# Patient Record
Sex: MALE | Race: WHITE | Employment: OTHER | URBAN - METROPOLITAN AREA
[De-identification: names, ages, dates, MRNs, and addresses within clinical notes are randomized per-mention and may not be internally consistent; named-entity substitution may affect disease eponyms.]

---

## 2018-07-30 ENCOUNTER — OFFICE VISIT (OUTPATIENT)
Dept: CARDIOLOGY CLINIC | Age: 63
End: 2018-07-30
Payer: COMMERCIAL

## 2018-07-30 VITALS
WEIGHT: 274 LBS | HEIGHT: 69 IN | BODY MASS INDEX: 40.58 KG/M2 | RESPIRATION RATE: 16 BRPM | HEART RATE: 74 BPM | DIASTOLIC BLOOD PRESSURE: 82 MMHG | SYSTOLIC BLOOD PRESSURE: 134 MMHG

## 2018-07-30 DIAGNOSIS — Z95.5 STENTED CORONARY ARTERY: Chronic | ICD-10-CM

## 2018-07-30 DIAGNOSIS — I25.10 CORONARY ARTERY DISEASE INVOLVING NATIVE CORONARY ARTERY OF NATIVE HEART WITHOUT ANGINA PECTORIS: Primary | Chronic | ICD-10-CM

## 2018-07-30 DIAGNOSIS — E78.00 PURE HYPERCHOLESTEROLEMIA: Chronic | ICD-10-CM

## 2018-07-30 DIAGNOSIS — I25.2 HX OF MYOCARDIAL INFARCTION: Chronic | ICD-10-CM

## 2018-07-30 PROCEDURE — 99214 OFFICE O/P EST MOD 30 MIN: CPT | Performed by: INTERNAL MEDICINE

## 2018-07-30 PROCEDURE — 93000 ELECTROCARDIOGRAM COMPLETE: CPT | Performed by: INTERNAL MEDICINE

## 2018-07-30 RX ORDER — FERROUS SULFATE 325(65) MG
TABLET ORAL
Refills: 1 | COMMUNITY
Start: 2018-07-06 | End: 2020-10-19 | Stop reason: CLARIF

## 2018-07-30 RX ORDER — GABAPENTIN 600 MG/1
600 TABLET ORAL 2 TIMES DAILY
COMMUNITY

## 2020-10-19 ENCOUNTER — OFFICE VISIT (OUTPATIENT)
Dept: CARDIOLOGY CLINIC | Age: 65
End: 2020-10-19
Payer: COMMERCIAL

## 2020-10-19 VITALS
BODY MASS INDEX: 39.55 KG/M2 | RESPIRATION RATE: 16 BRPM | SYSTOLIC BLOOD PRESSURE: 132 MMHG | HEIGHT: 69 IN | DIASTOLIC BLOOD PRESSURE: 78 MMHG | WEIGHT: 267 LBS | HEART RATE: 63 BPM

## 2020-10-19 PROCEDURE — 93000 ELECTROCARDIOGRAM COMPLETE: CPT | Performed by: INTERNAL MEDICINE

## 2020-10-19 PROCEDURE — 99214 OFFICE O/P EST MOD 30 MIN: CPT | Performed by: INTERNAL MEDICINE

## 2020-10-19 NOTE — PROGRESS NOTES
Patient Active Problem List   Diagnosis    CAD (coronary artery disease)    acute inferior wall MI 10/08/10    S/P PCI of RCA     Hypertension    Hyperlipidemia    Chest pain    Pseudoaneurysm ([address]    Stented coronary artery    Old MI (myocardial infarction)    Hx of myocardial infarction       Current Outpatient Medications   Medication Sig Dispense Refill    gabapentin (NEURONTIN) 600 MG tablet Take 600 mg by mouth 2 times daily.  nitroGLYCERIN (NITROSTAT) 0.4 MG SL tablet   1    citalopram (CELEXA) 20 MG tablet Take 20 mg by mouth daily.  Testosterone (ANDROGEL PUMP) 20.25 MG/ACT (1.62%) GEL gel Place 1 Squirt onto the skin.  aspirin 81 MG tablet Take 81 mg by mouth daily.  Cholecalciferol (VITAMIN D-3) 5000 UNITS TABS Take 5,000 Units by mouth daily.  atorvastatin (LIPITOR) 40 MG tablet Take 40 mg by mouth daily.  enalapril (VASOTEC) 10 MG tablet Take 10 mg by mouth daily.  metoprolol (LOPRESSOR) 25 MG tablet Take 25 mg by mouth 2 times daily. No current facility-administered medications for this visit. CC:    Patient is seen for new problem of chest pain and in follow up for:  1. Old MI (myocardial infarction)    2. Essential hypertension    3. Pure hypercholesterolemia    4. Stented coronary artery    5. New-onset angina (HCC)        HPI:  Notes onset about 1 month ago of chest pain. Notes when he mows the lawn he has to stop secondary to chest pain. Has required nitroglycerin sublingual for relief. Also, notes he has been waking up at night with some chest discomfort and has relief with nitroglycerin as well. Notes pain seems to be becoming more significant.     ROS:   General: No unusual weight gain,  change in exercise tolerance  Skin: No rash or itching  EENT: No vision changes or nosebleeds  Cardiovascular: No orthopnea or paroxysmal nocturnal dyspnea  Respiratory: No cough or hemoptysis  Gastrointestinal: No hematemesis or recent changes in bowel habits  Genitourinary: No hematuria, urgency or frequency  Musculoskeletal: No muscular weakness or joint swelling   Neurologic / Psychiatric: No incoordination or convulsions  Allergic / Immunologic/ Lymphatic / Endocrine: No anemia or bleeding tendency    Social History     Socioeconomic History    Marital status:      Spouse name: Not on file    Number of children: Not on file    Years of education: Not on file    Highest education level: Not on file   Occupational History    Not on file   Social Needs    Financial resource strain: Not on file    Food insecurity     Worry: Not on file     Inability: Not on file    Transportation needs     Medical: Not on file     Non-medical: Not on file   Tobacco Use    Smoking status: Never Smoker    Smokeless tobacco: Former User     Types: Snuff   Substance and Sexual Activity    Alcohol use: Yes     Comment: occassionally    Drug use: No    Sexual activity: Not on file   Lifestyle    Physical activity     Days per week: Not on file     Minutes per session: Not on file    Stress: Not on file   Relationships    Social connections     Talks on phone: Not on file     Gets together: Not on file     Attends [employer] service: Not on file     Active member of club or organization: Not on file     Attends meetings of clubs or organizations: Not on file     Relationship status: Not on file    Intimate partner violence     Fear of current or ex partner: Not on file     Emotionally abused: Not on file     Physically abused: Not on file     Forced sexual activity: Not on file   Other Topics Concern    Not on file   Social History Narrative    Not on file       Family History   Problem Relation Age of Onset    Heart Disease Mother     Heart Disease Father     Heart Disease Brother     Heart Disease Maternal Aunt     Heart Disease Maternal Uncle     Heart Disease Maternal Grandfather        Past Medical History:   Diagnosis Date    CAD (coronary artery disease)     Hyperlipidemia     Hypertension     Myocardial infarct (HCC)        PHYSICAL EXAM:  CONSTITUTIONAL:  Well developed, well nourished    Vitals:    10/19/20 0910   BP: 132/78   Pulse: 63   Resp: 16   Weight: 267 lb (121.1 kg)   Height: 5' 9\" (1.753 m)     HEAD & FACE: Normocephalic. Symmetric. EYES: No xanthelasma. Conjunctivae not injected. EARS, NOSE, MOUTH & THROAT: Good dentition. No oral pallor or cyanosis. NECK: No JVD at 30 degrees. No thyromegaly. RESPIRATORY: Clear to auscultation and percussion in all fields. No use of accessory muscle or intercostal retractions. CARDIOVASCULAR: Regular rate and rhythm. No lifts or thrills on palpitation. Auscultation with normal S1-S2 in intensity and splitting. No carotid bruits. Abdominal aorta not enlarged. Femoral arteries without bruits. Pedal pulses 2+. No edema. ABDOMEN: Soft without hepatic or splenic enlargement. No tenderness. MUSCULOSKELETAL: No kyphosis or scoliosis of the back. Good muscle strength and tone. No muscle atrophy. Normal gait and ability to undergo exercise stress testing. EXTREMITIES: No clubbing or cyanosis. SKIN: No Xanthomas or ulcerations. NEUROLOGIC: Oriented to time, place and person. Normal mood and affect. LYMPHATIC:  No palpable neck or supraclavicular nodes. No splenomegaly. EKG: the EKG tracing was reviewed and found to reveal: Normal sinus rhythm. No change compared to prior tracing. ASSESSMENT:                                                     ORDERS:       Diagnosis Orders   1. New-onset angina ([address]     2. Old MI (myocardial infarction)     3. Essential hypertension     4. Pure hypercholesterolemia     5. Stented coronary artery  Left heart cath   New onset angina      PLAN:   See above orders. Medication reconciliation completed.   Old records were reviewed and found to reveal: No change in EKG  Discussed issues that would prompt earlier evaluation/ER   Same cardiac medications. See no reason to pursue other options of evaluation. Discussed status with patient and will pursue heart catheterization due to his classic symptoms and history. Follow-up office visit: Pending above evaluation.

## 2020-10-20 ENCOUNTER — HOSPITAL ENCOUNTER (OUTPATIENT)
Age: 65
Discharge: HOME | End: 2020-10-20
Payer: COMMERCIAL

## 2020-10-20 LAB
ALBUMIN SERPL-MCNC: 3.9 G/DL (ref 3.5–5.2)
ALP BLD-CCNC: 87 U/L (ref 40–129)
ALT SERPL-CCNC: 24 U/L (ref 0–40)
ANION GAP SERPL CALCULATED.3IONS-SCNC: 6 MMOL/L (ref 7–16)
AST SERPL-CCNC: 20 U/L (ref 0–39)
BILIRUB SERPL-MCNC: 0.4 MG/DL (ref 0–1.2)
BUN BLDV-MCNC: 13 MG/DL (ref 8–23)
CALCIUM SERPL-MCNC: 9.2 MG/DL (ref 8.6–10.2)
CHLORIDE BLD-SCNC: 107 MMOL/L (ref 98–107)
CO2: 27 MMOL/L (ref 22–29)
CREAT SERPL-MCNC: 0.9 MG/DL (ref 0.7–1.2)
GFR AFRICAN AMERICAN: >60
GFR NON-AFRICAN AMERICAN: >60 ML/MIN/1.73
GLUCOSE BLD-MCNC: 110 MG/DL (ref 74–99)
HCT VFR BLD CALC: 44.7 % (ref 37–54)
HEMOGLOBIN: 14.2 G/DL (ref 12.5–16.5)
INR BLD: 0.9
MCH RBC QN AUTO: 28.5 PG (ref 26–35)
MCHC RBC AUTO-ENTMCNC: 31.8 % (ref 32–34.5)
MCV RBC AUTO: 89.6 FL (ref 80–99.9)
PDW BLD-RTO: 13.3 FL (ref 11.5–15)
PLATELET # BLD: 202 E9/L (ref 130–450)
PMV BLD AUTO: 10.4 FL (ref 7–12)
POTASSIUM SERPL-SCNC: 5.1 MMOL/L (ref 3.5–5)
PROTHROMBIN TIME: 10.8 SEC (ref 9.3–12.4)
RBC # BLD: 4.99 E12/L (ref 3.8–5.8)
SODIUM BLD-SCNC: 140 MMOL/L (ref 132–146)
TOTAL PROTEIN: 6.8 G/DL (ref 6.4–8.3)
WBC # BLD: 5.1 E9/L (ref 4.5–11.5)

## 2020-10-20 PROCEDURE — 36415 COLL VENOUS BLD VENIPUNCTURE: CPT

## 2020-10-20 PROCEDURE — 80053 COMPREHEN METABOLIC PANEL: CPT

## 2020-10-20 PROCEDURE — 85610 PROTHROMBIN TIME: CPT

## 2020-10-20 PROCEDURE — 85027 COMPLETE CBC AUTOMATED: CPT

## 2020-10-22 ENCOUNTER — HOSPITAL ENCOUNTER (INPATIENT)
Dept: CARDIAC CATH/INVASIVE PROCEDURES | Age: 65
LOS: 2 days | Discharge: HOME | DRG: 251 | End: 2020-10-24
Attending: FAMILY MEDICINE | Admitting: INTERNAL MEDICINE
Payer: COMMERCIAL

## 2020-10-22 PROBLEM — I24.9 CORONARY SYNDROME, ACUTE (HCC): Status: ACTIVE | Noted: 2020-10-22

## 2020-10-22 PROBLEM — I25.10 CAD IN NATIVE ARTERY: Status: ACTIVE | Noted: 2020-10-22

## 2020-10-22 LAB
ABO/RH: NORMAL
ANTIBODY SCREEN: NORMAL
EKG ATRIAL RATE: 44 BPM
EKG P AXIS: -23 DEGREES
EKG P-R INTERVAL: 178 MS
EKG Q-T INTERVAL: 432 MS
EKG QRS DURATION: 88 MS
EKG QTC CALCULATION (BAZETT): 427 MS
EKG R AXIS: 27 DEGREES
EKG T AXIS: 17 DEGREES
EKG VENTRICULAR RATE: 59 BPM
POC ACT LR: 222 SECONDS
POC ACT LR: 262 SECONDS

## 2020-10-22 PROCEDURE — 86900 BLOOD TYPING SEROLOGIC ABO: CPT

## 2020-10-22 PROCEDURE — 6370000000 HC RX 637 (ALT 250 FOR IP): Performed by: INTERNAL MEDICINE

## 2020-10-22 PROCEDURE — 92978 ENDOLUMINL IVUS OCT C 1ST: CPT | Performed by: INTERNAL MEDICINE

## 2020-10-22 PROCEDURE — 86850 RBC ANTIBODY SCREEN: CPT

## 2020-10-22 PROCEDURE — 93458 L HRT ARTERY/VENTRICLE ANGIO: CPT

## 2020-10-22 PROCEDURE — 2709999900 HC NON-CHARGEABLE SUPPLY

## 2020-10-22 PROCEDURE — 93571 IV DOP VEL&/PRESS C FLO 1ST: CPT

## 2020-10-22 PROCEDURE — C1769 GUIDE WIRE: HCPCS

## 2020-10-22 PROCEDURE — 2140000000 HC CCU INTERMEDIATE R&B

## 2020-10-22 PROCEDURE — 92928 PRQ TCAT PLMT NTRAC ST 1 LES: CPT | Performed by: INTERNAL MEDICINE

## 2020-10-22 PROCEDURE — G0378 HOSPITAL OBSERVATION PER HR: HCPCS

## 2020-10-22 PROCEDURE — 2580000003 HC RX 258: Performed by: INTERNAL MEDICINE

## 2020-10-22 PROCEDURE — 92920 PRQ TRLUML C ANGIOP 1ART&/BR: CPT

## 2020-10-22 PROCEDURE — 93458 L HRT ARTERY/VENTRICLE ANGIO: CPT | Performed by: INTERNAL MEDICINE

## 2020-10-22 PROCEDURE — 93571 IV DOP VEL&/PRESS C FLO 1ST: CPT | Performed by: INTERNAL MEDICINE

## 2020-10-22 PROCEDURE — C1725 CATH, TRANSLUMIN NON-LASER: HCPCS

## 2020-10-22 PROCEDURE — 4A023N7 MEASUREMENT OF CARDIAC SAMPLING AND PRESSURE, LEFT HEART, PERCUTANEOUS APPROACH: ICD-10-PCS | Performed by: INTERNAL MEDICINE

## 2020-10-22 PROCEDURE — B2111ZZ FLUOROSCOPY OF MULTIPLE CORONARY ARTERIES USING LOW OSMOLAR CONTRAST: ICD-10-PCS | Performed by: INTERNAL MEDICINE

## 2020-10-22 PROCEDURE — 93010 ELECTROCARDIOGRAM REPORT: CPT | Performed by: INTERNAL MEDICINE

## 2020-10-22 PROCEDURE — C1753 CATH, INTRAVAS ULTRASOUND: HCPCS

## 2020-10-22 PROCEDURE — 2500000003 HC RX 250 WO HCPCS

## 2020-10-22 PROCEDURE — B241ZZ3 ULTRASONOGRAPHY OF MULTIPLE CORONARY ARTERIES, INTRAVASCULAR: ICD-10-PCS | Performed by: INTERNAL MEDICINE

## 2020-10-22 PROCEDURE — 93005 ELECTROCARDIOGRAM TRACING: CPT | Performed by: INTERNAL MEDICINE

## 2020-10-22 PROCEDURE — 92978 ENDOLUMINL IVUS OCT C 1ST: CPT

## 2020-10-22 PROCEDURE — C1894 INTRO/SHEATH, NON-LASER: HCPCS

## 2020-10-22 PROCEDURE — 36415 COLL VENOUS BLD VENIPUNCTURE: CPT

## 2020-10-22 PROCEDURE — 86901 BLOOD TYPING SEROLOGIC RH(D): CPT

## 2020-10-22 PROCEDURE — 02703ZZ DILATION OF CORONARY ARTERY, ONE ARTERY, PERCUTANEOUS APPROACH: ICD-10-PCS | Performed by: INTERNAL MEDICINE

## 2020-10-22 PROCEDURE — G0379 DIRECT REFER HOSPITAL OBSERV: HCPCS

## 2020-10-22 PROCEDURE — 85347 COAGULATION TIME ACTIVATED: CPT

## 2020-10-22 PROCEDURE — B2151ZZ FLUOROSCOPY OF LEFT HEART USING LOW OSMOLAR CONTRAST: ICD-10-PCS | Performed by: INTERNAL MEDICINE

## 2020-10-22 PROCEDURE — 6370000000 HC RX 637 (ALT 250 FOR IP): Performed by: FAMILY MEDICINE

## 2020-10-22 PROCEDURE — C1887 CATHETER, GUIDING: HCPCS

## 2020-10-22 PROCEDURE — 6360000002 HC RX W HCPCS

## 2020-10-22 PROCEDURE — 6370000000 HC RX 637 (ALT 250 FOR IP)

## 2020-10-22 RX ORDER — NITROGLYCERIN 0.4 MG/1
0.4 TABLET SUBLINGUAL EVERY 5 MIN PRN
Status: DISCONTINUED | OUTPATIENT
Start: 2020-10-22 | End: 2020-10-24 | Stop reason: HOSPADM

## 2020-10-22 RX ORDER — ATORVASTATIN CALCIUM 40 MG/1
40 TABLET, FILM COATED ORAL DAILY
Status: DISCONTINUED | OUTPATIENT
Start: 2020-10-22 | End: 2020-10-24 | Stop reason: HOSPADM

## 2020-10-22 RX ORDER — ACETAMINOPHEN 325 MG/1
650 TABLET ORAL EVERY 6 HOURS PRN
Status: DISCONTINUED | OUTPATIENT
Start: 2020-10-22 | End: 2020-10-24 | Stop reason: HOSPADM

## 2020-10-22 RX ORDER — POLYETHYLENE GLYCOL 3350 17 G/17G
17 POWDER, FOR SOLUTION ORAL DAILY PRN
Status: DISCONTINUED | OUTPATIENT
Start: 2020-10-22 | End: 2020-10-24 | Stop reason: HOSPADM

## 2020-10-22 RX ORDER — GABAPENTIN 600 MG/1
600 TABLET ORAL 2 TIMES DAILY
Status: DISCONTINUED | OUTPATIENT
Start: 2020-10-22 | End: 2020-10-24 | Stop reason: HOSPADM

## 2020-10-22 RX ORDER — PROMETHAZINE HYDROCHLORIDE 25 MG/1
12.5 TABLET ORAL EVERY 6 HOURS PRN
Status: DISCONTINUED | OUTPATIENT
Start: 2020-10-22 | End: 2020-10-24 | Stop reason: HOSPADM

## 2020-10-22 RX ORDER — SODIUM CHLORIDE 9 MG/ML
INJECTION, SOLUTION INTRAVENOUS CONTINUOUS
Status: DISCONTINUED | OUTPATIENT
Start: 2020-10-22 | End: 2020-10-23

## 2020-10-22 RX ORDER — SODIUM CHLORIDE 9 MG/ML
INJECTION, SOLUTION INTRAVENOUS CONTINUOUS
Status: ACTIVE | OUTPATIENT
Start: 2020-10-22 | End: 2020-10-22

## 2020-10-22 RX ORDER — SODIUM CHLORIDE 0.9 % (FLUSH) 0.9 %
10 SYRINGE (ML) INJECTION EVERY 12 HOURS SCHEDULED
Status: DISCONTINUED | OUTPATIENT
Start: 2020-10-22 | End: 2020-10-24 | Stop reason: HOSPADM

## 2020-10-22 RX ORDER — ACETAMINOPHEN 325 MG/1
650 TABLET ORAL EVERY 4 HOURS PRN
Status: DISCONTINUED | OUTPATIENT
Start: 2020-10-22 | End: 2020-10-22 | Stop reason: SDUPTHER

## 2020-10-22 RX ORDER — ISOSORBIDE MONONITRATE 60 MG/1
60 TABLET, EXTENDED RELEASE ORAL DAILY
Status: DISCONTINUED | OUTPATIENT
Start: 2020-10-22 | End: 2020-10-24 | Stop reason: HOSPADM

## 2020-10-22 RX ORDER — ASPIRIN 81 MG/1
81 TABLET ORAL DAILY
Status: DISCONTINUED | OUTPATIENT
Start: 2020-10-23 | End: 2020-10-22 | Stop reason: SDUPTHER

## 2020-10-22 RX ORDER — CHOLECALCIFEROL (VITAMIN D3) 50 MCG
2000 TABLET ORAL DAILY
Status: DISCONTINUED | OUTPATIENT
Start: 2020-10-22 | End: 2020-10-24 | Stop reason: HOSPADM

## 2020-10-22 RX ORDER — CITALOPRAM 20 MG/1
20 TABLET ORAL DAILY
Status: DISCONTINUED | OUTPATIENT
Start: 2020-10-22 | End: 2020-10-24 | Stop reason: HOSPADM

## 2020-10-22 RX ORDER — ENALAPRIL MALEATE 10 MG/1
10 TABLET ORAL DAILY
Status: DISCONTINUED | OUTPATIENT
Start: 2020-10-22 | End: 2020-10-24 | Stop reason: HOSPADM

## 2020-10-22 RX ORDER — SODIUM CHLORIDE 0.9 % (FLUSH) 0.9 %
10 SYRINGE (ML) INJECTION PRN
Status: DISCONTINUED | OUTPATIENT
Start: 2020-10-22 | End: 2020-10-22 | Stop reason: SDUPTHER

## 2020-10-22 RX ORDER — SODIUM CHLORIDE 0.9 % (FLUSH) 0.9 %
10 SYRINGE (ML) INJECTION PRN
Status: DISCONTINUED | OUTPATIENT
Start: 2020-10-22 | End: 2020-10-24 | Stop reason: HOSPADM

## 2020-10-22 RX ORDER — SODIUM CHLORIDE 0.9 % (FLUSH) 0.9 %
10 SYRINGE (ML) INJECTION EVERY 12 HOURS SCHEDULED
Status: DISCONTINUED | OUTPATIENT
Start: 2020-10-22 | End: 2020-10-22 | Stop reason: SDUPTHER

## 2020-10-22 RX ORDER — ONDANSETRON 2 MG/ML
4 INJECTION INTRAMUSCULAR; INTRAVENOUS EVERY 6 HOURS PRN
Status: DISCONTINUED | OUTPATIENT
Start: 2020-10-22 | End: 2020-10-24 | Stop reason: HOSPADM

## 2020-10-22 RX ORDER — ASPIRIN 81 MG/1
81 TABLET ORAL DAILY
Status: DISCONTINUED | OUTPATIENT
Start: 2020-10-23 | End: 2020-10-24 | Stop reason: HOSPADM

## 2020-10-22 RX ORDER — ACETAMINOPHEN 650 MG/1
650 SUPPOSITORY RECTAL EVERY 6 HOURS PRN
Status: DISCONTINUED | OUTPATIENT
Start: 2020-10-22 | End: 2020-10-24 | Stop reason: HOSPADM

## 2020-10-22 RX ADMIN — SODIUM CHLORIDE: 9 INJECTION, SOLUTION INTRAVENOUS at 14:00

## 2020-10-22 RX ADMIN — ISOSORBIDE MONONITRATE 60 MG: 60 TABLET ORAL at 17:43

## 2020-10-22 RX ADMIN — ATORVASTATIN CALCIUM 40 MG: 40 TABLET, FILM COATED ORAL at 20:40

## 2020-10-22 RX ADMIN — ENALAPRIL MALEATE 10 MG: 10 TABLET ORAL at 17:43

## 2020-10-22 RX ADMIN — SODIUM CHLORIDE, PRESERVATIVE FREE 10 ML: 5 INJECTION INTRAVENOUS at 20:40

## 2020-10-22 RX ADMIN — Medication 2000 UNITS: at 17:42

## 2020-10-22 RX ADMIN — TICAGRELOR 90 MG: 90 TABLET ORAL at 20:40

## 2020-10-22 RX ADMIN — CITALOPRAM 20 MG: 20 TABLET, FILM COATED ORAL at 17:43

## 2020-10-22 RX ADMIN — SODIUM CHLORIDE: 9 INJECTION, SOLUTION INTRAVENOUS at 11:00

## 2020-10-22 NOTE — PROGRESS NOTES
After arrival to floor heart rate in mid 40's to 50's. Dr Rebeca Angel notified. Order received to hold if HR < 65. No c/o or distress.

## 2020-10-22 NOTE — PROGRESS NOTES
Message left on answering machine for Dr. Ortiz Reading regarding patient admission post cath. Also attempted to call their answering service and was told Dr. Kirti Veliz was out to Dr. William Queen to call office. Admitting order placed after message left.

## 2020-10-22 NOTE — PROCEDURES
[address]                  [address] [facility],  [facility]                            CARDIAC CATHETERIZATION    PATIENT NAME: Allen Baltazar                  :        1955  MED REC NO:   [id number]                            ROOM:       6318  ACCOUNT NO:   [de-identified]                           ADMIT DATE: 10/22/2020  PROVIDER:     Geraldo Padron MD    DATE OF PROCEDURE:  10/22/2020    LEFT HEART CATHETERIZATION, IFR OF LAD, PTCA AND IVUS OF THE RCA    INDICATION:  New-onset angina. REFERRING PHYSICIAN:  Arina Ferguson MD    PROCEDURE NOTE:  The patient was brought to the cardiac cath lab in his  usual fasting state. Under sterile condition and under local anesthetic  and using conscious sedation, a 6-Micronesian Terumo slender sheath was  inserted into the right radial artery. The patient received 5000 units  of intravenous heparin. He received also 300 mcg of intra-arterial  nitroglycerin via the right radial sheath. Then a 5-Micronesian TIG  diagnostic catheter was advanced to the ascending aorta without  difficulty. The left main coronary artery was engaged, and a coronary  angiogram was done. This catheter failed to engage the right coronary  artery. It was exchanged over a guidewire to a 5-Micronesian AR-2 diagnostic  catheter which was able to engage the right coronary artery, and a  coronary angiogram was done. A 5-Micronesian pigtail was advanced into the  left ventricle without difficulty. The left ventricular end-diastolic  pressure was measured. Left ventriculogram was done. There was no  significant gradient across the aortic valve. FINDINGS:  HEMODYNAMIC:  Aortic pressure 110/84 mmHg. LVEDP 28 mmHg. CORONARY ANATOMY:  LEFT MAIN:  It is a long and large artery with no angiographic stenosis  noted. LAD:  It is a large artery which is calcified in its proximal to mid  segment.   It gives rise to three small diagonal branches and large  septal . There was a long 60 to 70% mid stenosis. There was  40 to 50% discrete stenosis in proximal second diagonal and 70% discrete  stenosis in the third diagonal branch which is approximately 1.5-1.6 mm  in diameter. LEFT CIRCUMFLEX:  It is a large artery giving rise to a large  bifurcating first obtuse marginal branch and a second smaller obtuse  marginal branch. There was 20-30% proximal circumflex stenosis. RCA:  It is medium in size and dominant giving rise to a conus branch, a  fair size RV marginal branch, a PDA and a PLV branch. The RCA has an  inferior takeoff. It is calcified in its proximal to mid segment. There was a long 50 to 60% mid stenosis. There was a stent inside the  stent in the distal RCA prior to the bifurcation with 80 to 90% focal  in-stent restenosis involving the proximal segment of the stents. LEFT VENTRICULOGRAM:  Revealed basal inferior akinesis with an ejection  fraction of 55%. There was trivial mitral regurgitation. IFR OF LAD AND PCI AND IVUS OF THE RCA:  The patient received 4000 units  of intravenous heparin. Then a 6-[place] Islander EBU 3.5 guiding catheter was  used to engage the left main coronary artery. Then, IFR Volcano  catheter was zeroed and normalized and was advanced to the distal LAD  without difficulty. IFR obtained was 0.90, then 0.90, then 0.89 x3. the patient was asked about his preference of CABG versus PCI of RCA  with staged PCI of the LAD. The patient was relucting to undergo bypass  at this time. So decision was made to proceed with PCI of the RCA and  staged PCI of the LAD in the next few weeks. Then a 6-[place] Islander ART 3.5  guiding catheter failed to engage the right coronary artery. It was  exchanged over a guidewire to a 6-[place] Islander JR-4 guiding catheter with side  holes. Then a BMW wire was advanced to the PDA without difficulty.    Then a 2.5 x 15 [employer] Monorail balloon was inflated at 12 atmospheres at  the site of the intravenous Versed and 150 mcg of  intravenous fentanyl.         Marty Escobedo MD    D: 10/22/2020 10:28:45       T: 10/22/2020 10:36:40     [email or web address]  Job#: [id number]     Doc#: [id number]    CC:

## 2020-10-23 LAB
ANION GAP SERPL CALCULATED.3IONS-SCNC: 9 MMOL/L (ref 7–16)
BUN BLDV-MCNC: 15 MG/DL (ref 8–23)
CALCIUM SERPL-MCNC: 8.6 MG/DL (ref 8.6–10.2)
CHLORIDE BLD-SCNC: 103 MMOL/L (ref 98–107)
CO2: 22 MMOL/L (ref 22–29)
CREAT SERPL-MCNC: 0.9 MG/DL (ref 0.7–1.2)
GFR AFRICAN AMERICAN: >60
GFR NON-AFRICAN AMERICAN: >60 ML/MIN/1.73
GLUCOSE BLD-MCNC: 112 MG/DL (ref 74–99)
HCT VFR BLD CALC: 40.2 % (ref 37–54)
HEMOGLOBIN: 13.2 G/DL (ref 12.5–16.5)
MAGNESIUM: 1.9 MG/DL (ref 1.6–2.6)
MCH RBC QN AUTO: 28.4 PG (ref 26–35)
MCHC RBC AUTO-ENTMCNC: 32.8 % (ref 32–34.5)
MCV RBC AUTO: 86.6 FL (ref 80–99.9)
PDW BLD-RTO: 13.3 FL (ref 11.5–15)
PLATELET # BLD: 171 E9/L (ref 130–450)
PMV BLD AUTO: 10.2 FL (ref 7–12)
POTASSIUM SERPL-SCNC: 3.8 MMOL/L (ref 3.5–5)
RBC # BLD: 4.64 E12/L (ref 3.8–5.8)
SODIUM BLD-SCNC: 134 MMOL/L (ref 132–146)
WBC # BLD: 5.9 E9/L (ref 4.5–11.5)

## 2020-10-23 PROCEDURE — 99232 SBSQ HOSP IP/OBS MODERATE 35: CPT | Performed by: INTERNAL MEDICINE

## 2020-10-23 PROCEDURE — 80048 BASIC METABOLIC PNL TOTAL CA: CPT

## 2020-10-23 PROCEDURE — 6370000000 HC RX 637 (ALT 250 FOR IP): Performed by: INTERNAL MEDICINE

## 2020-10-23 PROCEDURE — 6370000000 HC RX 637 (ALT 250 FOR IP): Performed by: FAMILY MEDICINE

## 2020-10-23 PROCEDURE — 2140000000 HC CCU INTERMEDIATE R&B

## 2020-10-23 PROCEDURE — G0378 HOSPITAL OBSERVATION PER HR: HCPCS

## 2020-10-23 PROCEDURE — 83735 ASSAY OF MAGNESIUM: CPT

## 2020-10-23 PROCEDURE — 85027 COMPLETE CBC AUTOMATED: CPT

## 2020-10-23 PROCEDURE — 2580000003 HC RX 258: Performed by: INTERNAL MEDICINE

## 2020-10-23 PROCEDURE — 36415 COLL VENOUS BLD VENIPUNCTURE: CPT

## 2020-10-23 RX ORDER — EZETIMIBE 10 MG/1
10 TABLET ORAL NIGHTLY
Status: DISCONTINUED | OUTPATIENT
Start: 2020-10-23 | End: 2020-10-24 | Stop reason: HOSPADM

## 2020-10-23 RX ADMIN — Medication 2000 UNITS: at 10:23

## 2020-10-23 RX ADMIN — GABAPENTIN 600 MG: 600 TABLET, FILM COATED ORAL at 10:22

## 2020-10-23 RX ADMIN — TICAGRELOR 90 MG: 90 TABLET ORAL at 21:54

## 2020-10-23 RX ADMIN — ATORVASTATIN CALCIUM 40 MG: 40 TABLET, FILM COATED ORAL at 21:54

## 2020-10-23 RX ADMIN — METOPROLOL TARTRATE 25 MG: 25 TABLET, FILM COATED ORAL at 10:22

## 2020-10-23 RX ADMIN — SODIUM CHLORIDE, PRESERVATIVE FREE 10 ML: 5 INJECTION INTRAVENOUS at 21:54

## 2020-10-23 RX ADMIN — ENALAPRIL MALEATE 10 MG: 10 TABLET ORAL at 10:23

## 2020-10-23 RX ADMIN — ISOSORBIDE MONONITRATE 60 MG: 60 TABLET ORAL at 10:23

## 2020-10-23 RX ADMIN — GABAPENTIN 600 MG: 600 TABLET, FILM COATED ORAL at 21:54

## 2020-10-23 RX ADMIN — CITALOPRAM 20 MG: 20 TABLET, FILM COATED ORAL at 10:23

## 2020-10-23 RX ADMIN — ASPIRIN 81 MG: 81 TABLET, FILM COATED ORAL at 10:23

## 2020-10-23 RX ADMIN — EZETIMIBE 10 MG: 10 TABLET ORAL at 21:54

## 2020-10-23 RX ADMIN — TICAGRELOR 90 MG: 90 TABLET ORAL at 10:22

## 2020-10-23 NOTE — H&P
[address]                  [address] [facility],  [facility]                              HISTORY AND PHYSICAL    PATIENT NAME: Minh Bellamy                  :        1955  MED REC NO:   [id number]                            ROOM:       6318  ACCOUNT NO:   [de-identified]                           ADMIT DATE: 10/22/2020  PROVIDER:     Aditya Sandoval MD    CHIEF COMPLAINT:  Abnormal cardiac catheterization. HISTORY OF PRESENTING ILLNESSES:  This 53-year-old who has really over  the last month developed unstable angina. He has presented to the  hospital with a non-STEMI and went home. Recently saw his cardiologist  who referred him for cardiac catheterization. Yesterday, he had  catheterization and he had a left anterior descending artery that did  have 60% to 70% mid stenosis and had other stenoses in diagonal  branches. Circumflex also had lesions. Right coronary had a 60% mid  stenosis. There is stent inside the stent in the distal right coronary  with 80% to 90% focal in-stent restenosis developing there, so the  impression was that he had a physiologically significant long mid left  anterior descending artery stenosis. He had a 70% proximal stenosis in  a small third diagonal branch, severe mid right coronary artery stenosis  with in-stent restenosis and he was given an angioplasty for that. Because of this, it was elected to keep him in the hospital, put him on  dual antiplatelet therapy _____ Nilson Galas to the regimen. He had been on  dual cholesterol therapy for quite some time and he also did receive a  cardiac surgeon opinion. PAST MEDICAL HISTORY:  Significant for at least 10 years worth of  coronary artery disease. He has had coronary angioplasty with stent  placement in , again had it repeated in . He has had myocardial  infarction. He is known to have hyperlipidemia and hypertension.     SOCIAL HISTORY:  He has never

## 2020-10-23 NOTE — PROGRESS NOTES
CLINICAL PHARMACY NOTE: MEDS TO [address] Select Patient?: No  Total # of Prescriptions Filled: 1   The following medications were delivered to the patient:  · brilinta 90mg  Total # of Interventions Completed: 3  Time Spent (min): 45    Additional Documentation:  Delivered to patient

## 2020-10-23 NOTE — PROGRESS NOTES
Inpatient Cardiology Progress note     PATIENT IS BEING FOLLOWED FOR: CAD    Ayan Greene is a 63 y.o. male who follows with Dr. Jose Bourgeois (past 24 hours):  - no acute events overnight  - denies chest pain, palpitations or shortness of breath   - right wrist C/D/I, hand warm and well perfused      ROS:  Consist: Denies fevers, chills or night sweats  Heart: Denies chest pain, palpitations, lightheadedness, dizziness or syncope  Lungs: Denies SOB, cough, wheezing, orthopnea or PND  GI: Denies abdominal pain, vomiting or diarrhea    PHYSICAL EXAM:   BP (!) 109/58   Pulse 62   Temp 98.3 °F (36.8 °C) (Temporal)   Resp 14   Ht 5' 10\" (1.778 m)   Wt 263 lb 12.8 oz (119.7 kg)   SpO2 95%   BMI 37.85 kg/m²    B/P Range last 24 hours: Systolic (65DJO), IBZ:823 , Min:109 , WSA:882    Diastolic (83QVW), ALIZE:30, Min:58, Max:78    CONST: Well developed, obese  male who appears stated age. Awake, alert and cooperative. No apparent distress  HEENT:   Head- Normocephalic, atraumatic   Eyes- Conjunctivae pink, anicteric  Throat- Oral mucosa pink and moist  Neck-  No stridor, trachea midline, no jugular venous distention. No carotid bruit  CHEST: Chest symmetrical and non-tender to palpation. No accessory muscle use or intercostal retractions  RESPIRATORY:  Lung sounds - clear throughout fields   CARDIOVASCULAR:     Heart Inspection- shows no noted pulsations  Heart Palpation- no heaves or thrills; PMI is non-displaced   Heart Ausculation- Regular rate and rhythm, no murmur. No s3, s4 or rub   PV: No lower extremity edema. No varicosities. Pedal pulses palpable, no clubbing or cyanosis   ABDOMEN: Soft, non-tender to light palpation. Bowel sounds present. No palpable masses no organomegaly; no abdominal bruit  MS: Good muscle strength and tone. No atrophy or abnormal movements. : Deferred  SKIN: Warm and dry no statis dermatitis or ulcers. Right wrist dressing C/D/I.  Hand warm and well perfused. NEURO / PSYCH: Oriented to person, place and time. Speech clear and appropriate. Follows all commands. Pleasant affect       Intake/Output Summary (Last 24 hours) at 10/23/2020 1007  Last data filed at 10/23/2020 0559  Gross per 24 hour   Intake 832 ml   Output 550 ml   Net 282 ml       Weight:   Wt Readings from Last 3 Encounters:   10/23/20 263 lb 12.8 oz (119.7 kg)   10/19/20 267 lb (121.1 kg)   07/30/18 274 lb (124.3 kg)     Current Inpatient Medications:   ezetimibe  10 mg Oral Nightly    ticagrelor  90 mg Oral BID    aspirin  81 mg Oral Daily    atorvastatin  40 mg Oral Daily    vitamin D  2,000 Units Oral Daily    citalopram  20 mg Oral Daily    enalapril  10 mg Oral Daily    gabapentin  600 mg Oral BID    metoprolol tartrate  25 mg Oral BID    sodium chloride flush  10 mL Intravenous 2 times per day    isosorbide mononitrate  60 mg Oral Daily       IV Infusions (if any):      DIAGNOSTIC/ LABORATORY DATA:  Recent Labs     10/23/20  0424   WBC 5.9   HGB 13.2   HCT 40.2        BMP:   Recent Labs     10/23/20  0424      K 3.8   CO2 22   BUN 15   CREATININE 0.9   LABGLOM >60   CALCIUM 8.6     Mag:   Recent Labs     10/23/20  0424   MG 1.9     HgA1c:   Lab Results   Component Value Date    LABA1C 5.3 10/09/2010     FASTING LIPID PANEL:  Lab Results   Component Value Date    CHOL 172 10/08/2010    HDL 26.0 10/08/2010    LDLCALC 93 10/08/2010    TRIG 266 10/08/2010       CARDIAC TESTING:    [facility] (10/22/2020, Dr. Vince Frazier)  HEMODYNAMICS:  Aortic pressure 110/84 mmHg. LVEDP 28 mmHg. CORONARY ANATOMY:  LEFT MAIN:  It is a long and large artery with no angiographic stenosis noted. LAD:  It is a large artery which is calcified in its proximal to mid segment. It gives rise to three small diagonal branches and large septal . There was a long 60 to 70% mid stenosis.   There was 40 to 50% discrete stenosis in proximal second diagonal and 70% discrete stenosis in the third diagonal branch which is approximately 1.5-1.6 mm in diameter. LEFT CIRCUMFLEX:  It is a large artery giving rise to a large bifurcating first obtuse marginal branch and a second smaller obtuse marginal branch. There was 20-30% proximal circumflex stenosis. RCA:  It is medium in size and dominant giving rise to a conus branch, a fair size RV marginal branch, a PDA and a PLV branch. The RCA has an inferior takeoff. It is calcified in its proximal to mid segment. There was a long 50 to 60% mid stenosis. There was a stent inside the stent in the distal RCA prior to the bifurcation with 80 to 90% focal in-stent restenosis involving the proximal segment of the stents. LEFT VENTRICULOGRAM:  Revealed basal inferior akinesis with an ejection fraction of 55%. There was trivial mitral regurgitation. IFR OF LAD AND PCI AND IVUS OF THE RCA:  The patient received 4000 units of intravenous heparin. Then a 6-[place] EBU 3.5 guiding catheter was used to engage the left main coronary artery. Then, IFR Volcano  catheter was zeroed and normalized and was advanced to the distal LAD without difficulty. IFR obtained was 0.90, then 0.90, then 0.89 x3. The patient was asked about his preference of CABG versus PCI of RCA  with staged PCI of the LAD. The patient was relucting to undergo bypass at this time. So decision was made to proceed with PCI of the RCA and staged PCI of the LAD in the next few weeks.  Then a 6-[place] ART 3.5  guiding catheter failed to engage the right coronary artery. It was exchanged over a guidewire to a 6-[place] JR-4 guiding catheter with side holes. Then a BMW wire was advanced to the PDA without difficulty. Then a 2.5 x 15 [place] Monorail balloon was inflated at 12 atmospheres at the site of the stenosis inside the stent. Then a [place] 2.5 x 10  cutting balloon was inflated twice inside the stent at 12 atmospheres with 50% residual in-stent restenosis and SHERRIE-3 flow.   To mention that the flow was SHERRIE-3 flow prior to the intervention. Then the [facility] IVUS catheter was advanced to the proximal PDA and manual pullback was done. There was a stent placed inside the stent, and both stents were undersized with minimal in-stent restenosis post PTCA. There was no evidence of edge dissection. There was diffuse plaque in the RCA with calcifications. There was a severe occlusive plaque at the mid RCA  with positive remodelling. IMPRESSION:  1. Physiologically significant long mid LAD stenosis. 2.  70% discrete proximal stenosis in the small third diagonal branch. 3.  Severe mid RCA stenosis by IVUS with focal severe in-stent restenosis in distal RCA due to undersized stents, status post PTCA with 0% residual stenosis. 4.  Elevated LVEDP at 28 mmHg. 5.  Ejection fraction of 55%.         ASSESSMENT:  1. CAD. Hx inferior MI (JACOB RCA 10/10); JACOB distal RCA (1/13); PTCA to RCA secondary to in-stent restenosis (10/22/2020) with physiologically significant mid LAD stenosis and diffuse RCA plaque which is occlusive at the mid segment by IVUS. 2. HTN  3. HLD  4. Obesity   5. OCTAVIO - compliant    PLAN:  1. Continue DAPT, statin and BB  2. Aggressive risk factor modification  3. Consider referral as outpatient to CT surgery for potential CABG due to the diffuse disease in the RCA and recurrence of in-stent restenosis due to undersized stents and due to severe physiologically significant long calcified mid LAD stenosis. 4. [id number] [address] Keenan for discharge from cardiology standpoint  5. Follow up with Dr. Aleida Elizalde         Above case discussed with Dr. Axel Ruiz    Thank you for allowing us to participate in the care of this patient. We will follow as medical course develops. Do not hesitate to contact us with further questions. Kodak Elliott APRN-CNP  [facility].

## 2020-10-23 NOTE — CARE COORDINATION
10/23, SW met with patient at bedside to discuss transition of care/discharge plan and SW role. Discharge plan is home once medically cleared for discharge. Patient lives with wife in a 2 story home with 3 steps to enter the home. DME owned is none. PCP is Dr. Musa Castaneda and Pharmacy is [facility] or [facility] on [phone] in [facility]. Discussed patient being on Airtime. Per nurse-patient to get a free 30 day supply. SW unable to give price to patient on Airtime due to script would need sent to pharmacy. Patient was given pharmacy assistance information-although patient does have insurance. No needs identified at this time. Wife to be transportation. SW to follow for further discharge planning needs.   Tina Ball, [address] [facility]

## 2020-10-23 NOTE — PLAN OF CARE
Problem: Pain:  Goal: Recognizes and communicates pain  Description: Recognizes and communicates pain  Outcome: Met This Shift

## 2020-10-24 VITALS
HEIGHT: 70 IN | TEMPERATURE: 98 F | OXYGEN SATURATION: 95 % | HEART RATE: 66 BPM | RESPIRATION RATE: 16 BRPM | BODY MASS INDEX: 37.65 KG/M2 | DIASTOLIC BLOOD PRESSURE: 72 MMHG | SYSTOLIC BLOOD PRESSURE: 120 MMHG | WEIGHT: 263 LBS

## 2020-10-24 PROCEDURE — 99254 IP/OBS CNSLTJ NEW/EST MOD 60: CPT | Performed by: THORACIC SURGERY (CARDIOTHORACIC VASCULAR SURGERY)

## 2020-10-24 PROCEDURE — 6370000000 HC RX 637 (ALT 250 FOR IP): Performed by: INTERNAL MEDICINE

## 2020-10-24 PROCEDURE — 2580000003 HC RX 258: Performed by: INTERNAL MEDICINE

## 2020-10-24 PROCEDURE — G0378 HOSPITAL OBSERVATION PER HR: HCPCS

## 2020-10-24 PROCEDURE — 6370000000 HC RX 637 (ALT 250 FOR IP): Performed by: FAMILY MEDICINE

## 2020-10-24 RX ORDER — ISOSORBIDE MONONITRATE 60 MG/1
60 TABLET, EXTENDED RELEASE ORAL DAILY
Qty: 30 TABLET | Refills: 3 | Status: SHIPPED | OUTPATIENT
Start: 2020-10-25 | End: 2020-10-24

## 2020-10-24 RX ORDER — EZETIMIBE 10 MG/1
10 TABLET ORAL NIGHTLY
Qty: 30 TABLET | Refills: 3 | Status: SHIPPED | OUTPATIENT
Start: 2020-10-24 | End: 2020-10-24

## 2020-10-24 RX ORDER — ISOSORBIDE MONONITRATE 60 MG/1
60 TABLET, EXTENDED RELEASE ORAL DAILY
Qty: 30 TABLET | Refills: 3 | Status: ON HOLD
Start: 2020-10-25 | End: 2021-01-04 | Stop reason: HOSPADM

## 2020-10-24 RX ORDER — EZETIMIBE 10 MG/1
10 TABLET ORAL NIGHTLY
Qty: 30 TABLET | Refills: 3 | Status: SHIPPED | OUTPATIENT
Start: 2020-10-24 | End: 2020-12-17

## 2020-10-24 RX ADMIN — ASPIRIN 81 MG: 81 TABLET, FILM COATED ORAL at 09:40

## 2020-10-24 RX ADMIN — SODIUM CHLORIDE, PRESERVATIVE FREE 10 ML: 5 INJECTION INTRAVENOUS at 09:41

## 2020-10-24 RX ADMIN — ENALAPRIL MALEATE 10 MG: 10 TABLET ORAL at 09:40

## 2020-10-24 RX ADMIN — METOPROLOL TARTRATE 25 MG: 25 TABLET, FILM COATED ORAL at 09:40

## 2020-10-24 RX ADMIN — Medication 2000 UNITS: at 09:41

## 2020-10-24 RX ADMIN — CITALOPRAM 20 MG: 20 TABLET, FILM COATED ORAL at 09:40

## 2020-10-24 RX ADMIN — ISOSORBIDE MONONITRATE 60 MG: 60 TABLET ORAL at 09:40

## 2020-10-24 RX ADMIN — TICAGRELOR 90 MG: 90 TABLET ORAL at 09:41

## 2020-10-24 RX ADMIN — GABAPENTIN 600 MG: 600 TABLET, FILM COATED ORAL at 09:40

## 2020-10-24 NOTE — DISCHARGE SUMMARY
Matias Valero   Home Medication Instructions SYW:[id number]    Printed on:10/24/20 3721   Medication Information                      aspirin 81 MG tablet  Take 81 mg by mouth daily. atorvastatin (LIPITOR) 40 MG tablet  Take 40 mg by mouth daily. Cholecalciferol (VITAMIN D-3) 5000 UNITS TABS  Take 5,000 Units by mouth daily. citalopram (CELEXA) 20 MG tablet  Take 20 mg by mouth daily. enalapril (VASOTEC) 10 MG tablet  Take 10 mg by mouth daily. gabapentin (NEURONTIN) 600 MG tablet  Take 600 mg by mouth 2 times daily. metoprolol (LOPRESSOR) 25 MG tablet  Take 25 mg by mouth 2 times daily. nitroGLYCERIN (NITROSTAT) 0.4 MG SL tablet               Testosterone (ANDROGEL PUMP) 20.25 MG/ACT (1.62%) GEL gel  Place 1 Squirt onto the skin. Activity: as tolerated  Diet: cardiac    Time Spent on discharge is more than 30 min. discussing plan of care and discharge medications. Active Problems:    CAD in native artery    Coronary syndrome, acute ([facility] [address]  Resolved Problems:    * No resolved hospital problems.  *      Signed:  Electronically signed by Reilly Elizalde MD on 10/24/2020 at 12:15 PM

## 2020-10-24 NOTE — CONSULTS
CTS Consult    Patient name: Scooby Peter    Reason for consult: CAD    Referring Physician: Dr. Posey Vincent    Primary Care Physician: Devin Shaikh MD    Date of service: 10/24/2020    Chief Complaint: CP    HPI: 63year old with a history of PCI x 2 and then subsequent PTCA of ISR presents back for elective heart cath and had PTCA. He was loaded with Brilinta. He was also found to have LAD disease which was significant. He currently denies CP, SOB, JOSE, LE edema, N/V, F/C, orthopnea, PND and syncope.     Allergies: No Known Allergies    Home medications:    Current Facility-Administered Medications   Medication Dose Route Frequency Provider Last Rate Last Dose    ezetimibe (ZETIA) tablet 10 mg  10 mg Oral Nightly Adam Landry MD   10 mg at 10/23/20 2154    ticagrelor (BRILINTA) tablet 90 mg  90 mg Oral BID Lisa Velazquez MD   90 mg at 10/23/20 2154    aspirin EC tablet 81 mg  81 mg Oral Daily Adam Landry MD   81 mg at 10/23/20 1023    atorvastatin (LIPITOR) tablet 40 mg  40 mg Oral Daily Adam Landry MD   40 mg at 10/23/20 2154    Vitamin D (CHOLECALCIFEROL) tablet 2,000 Units  2,000 Units Oral Daily Adam Landry MD   2,000 Units at 10/23/20 1023    citalopram (CELEXA) tablet 20 mg  20 mg Oral Daily Adam Landry MD   20 mg at 10/23/20 1023    enalapril (VASOTEC) tablet 10 mg  10 mg Oral Daily Adam Landry MD   10 mg at 10/23/20 1023    gabapentin (NEURONTIN) tablet 600 mg  600 mg Oral BID Adam Landry MD   600 mg at 10/23/20 2154    metoprolol tartrate (LOPRESSOR) tablet 25 mg  25 mg Oral BID Devin Shaikh MD   25 mg at 10/23/20 1022    nitroGLYCERIN (NITROSTAT) SL tablet 0.4 mg  0.4 mg Sublingual Q5 Min PRN Adam Landry MD        sodium chloride flush 0.9 % injection 10 mL  10 mL Intravenous 2 times per day Adam Landry MD   10 mL at 10/23/20 2154    sodium chloride flush 0.9 % injection 10 mL  10 mL Intravenous PRN Elma Jackson Thierno Lennon MD        acetaminophen (TYLENOL) tablet 650 mg  650 mg Oral Q6H PRN Jacqueline Hilton MD        Or    acetaminophen (TYLENOL) suppository 650 mg  650 mg Rectal Q6H PRN Jacqueline Hilton MD        polyethylene glycol [facility]) packet 17 g  17 g Oral Daily PRN Jacqueline Hilton MD        promethazine (PHENERGAN) tablet 12.5 mg  12.5 mg Oral Q6H PRN Jacqueline Hilton MD        Or    ondansetron [facility]) injection 4 mg  4 mg Intravenous Q6H PRN Jacqueline Hilton MD        isosorbide mononitrate (IMDUR) extended release tablet 60 mg  60 mg Oral Daily Luisa Davey MD   60 mg at 10/23/20 1023       Past Medical History:  Past Medical History:   Diagnosis Date    CAD (coronary artery disease)     Hyperlipidemia     Hypertension     Myocardial infarct [facility])        Past Surgical History:  Past Surgical History:   Procedure Laterality Date    CARDIAC CATHETERIZATION  10/22/2020    Dr. Geraldo Ball- EF 50-55%, PTA Distal RCA    COLONOSCOPY      CORONARY ANGIOPLASTY      CORONARY ANGIOPLASTY Right 06/06/2017    Dr. Yvonne Alaniz PTCA-RCA-Rt Wrist    Carlos Estrella  10-    Dr. Fernandez [facility]  1-    2.75 x 18mm Xience Distal RCA Dr. Nava [facility] CATH LAB PROCEDURE      KNEE SURGERY Right 04/2016    POLYPECTOMY      TONSILLECTOMY         Social History:  Social History     Socioeconomic History    Marital status:      Spouse name: Not on file    Number of children: Not on file    Years of education: Not on file    Highest education level: Not on file   Occupational History    Not on file   Social Needs    Financial resource strain: Not on file    Food insecurity     Worry: Not on file     Inability: Not on file    Transportation needs     Medical: Not on file     Non-medical: Not on file   Tobacco Use    Smoking status: Never Smoker    Smokeless tobacco: Former User     Types: Snuff Substance and Sexual Activity    Alcohol use: Yes     Comment: occassionally    Drug use: No    Sexual activity: Not on file   Lifestyle    Physical activity     Days per week: Not on file     Minutes per session: Not on file    Stress: Not on file   Relationships    Social connections     Talks on phone: Not on file     Gets together: Not on file     Attends Judaism service: Not on file     Active member of club or organization: Not on file     Attends meetings of clubs or organizations: Not on file     Relationship status: Not on file    Intimate partner violence     Fear of current or ex partner: Not on file     Emotionally abused: Not on file     Physically abused: Not on file     Forced sexual activity: Not on file   Other Topics Concern    Not on file   Social History Narrative    Not on file       Family History:  Family History   Problem Relation Age of Onset    Heart Disease Mother     Heart Disease Father     Heart Disease Brother     Heart Disease Maternal Aunt     Heart Disease Maternal Uncle     Heart Disease Maternal Grandfather        Review of Systems:  Constitutional: Denies fevers, chills, or weight loss. HEENT: Denies visual changes or hearing loss. Heart: As per HPI. Lungs: Denies shortness of breath, cough, or wheezing. Gastrointestinal: Denies nausea, vomiting, constipation, or diarrhea. Genitourinary: dysuria or hematuria. Psychiatric: Patient denies anxiety or depression. Neurologic: Patient denies weakness of the extremities, dizziness, or headaches. All other ROS checked and found to be negative.     Labs:  Recent Labs     10/23/20  0424   WBC 5.9   HGB 13.2   HCT 40.2         Recent Labs     10/23/20  0424   BUN 15   CREATININE 0.9       Objective:  Vitals BP (!) 157/82   Pulse 65   Temp 97.8 °F (36.6 °C) (Temporal)   Resp 16   Ht 5' 10\" (1.778 m)   Wt 263 lb (119.3 kg)   SpO2 95%   BMI 37.74 kg/m²   General Appearance: Pleasant 63y.o. year old male who appears stated age. Communicates well, no acute distress. HEENT: Head is normocephalic, atraumatic. EOMs intact, PERRL. Trachea midline. Lungs: Normal respiratory rate and normal effort. He is not in respiratory distress. Breath sounds clear to auscultation. No wheezes. Heart: Normal rate. Regular rhythm. S1 normal and S2 normal. Positive for murmur. Chest: Symmetric chest wall expansion. Extremities: Normal range of motion. Neurological: Patient is alert and oriented to person, place and time. Patient has normal reflexes. Skin: Warm and dry. Abdomen: Abdomen is soft and non-distended. Bowel sounds are normal. There is no abdominal tenderness tenderness. There is no guarding. There is no mass. Pulses: Distal pulses are intact. Skin: Warm and dry without lesions. Assessment: CAD        Plan: Elective CABG to LAD and PDA +/- Diag 2. [id number] Isa Keenan for discharge. I will see him in the office for scheduling.           Electronically signed by Haven Guevara MD on 10/24/2020 at 9:23 AM

## 2020-11-05 ENCOUNTER — TELEPHONE (OUTPATIENT)
Dept: CARDIAC CATH/INVASIVE PROCEDURES | Age: 65
End: 2020-11-05

## 2020-11-05 NOTE — TELEPHONE ENCOUNTER
Patient Education:  Post PCI, Risk Factors, Recovery, Prevention for Future, Lifestyle Changes     Mailed patient Education Packet to review information relating to current diagnosis, lifestyle Modification, and medications. The packet contains information on the follow topics as they relate to the patient specifically. 1. CAD & Coronary Stents- A&P, cardiac cath, equipment used, heart anatomy  2. HTN- what is blood pressure, normals, how to manage BP/lifestyle changes  3. HLD-cholesterol, types, where it comes from, nutrition counseling  4. Diabetes Education- discussed cardiovascular effects, packet given for Hospital Education classes included if applicable. 5. Smoking cessation- nicotine effects on body and stents, how to quit, tobacco treatment center brochure included if applicable. 6. Active lifestyle suggestions- why activity and exercise are recommended, suggestions to start, Cardiac Rehabilitation benefits  7. Healthy eating- tips to help with Blood pressure and cholesterol management, eating regular meals, alternative food choices. 8. Stress management techniques  9. Post hospital follow up visit with PCP and cardiology encouraged  10. Medication Information- aspirin, P2Y12,, statins- what they are indicated for and importance of compliance. Patient information card given to patient to keep in wallet or pocket for record keeping on medical history, doctor names and numbers, medication list.      Letter included with explanation of packet and my contact information and encouraged to call with questions they may have about information received.

## 2020-11-10 ENCOUNTER — PREP FOR PROCEDURE (OUTPATIENT)
Dept: CARDIOTHORACIC SURGERY | Age: 65
End: 2020-11-10

## 2020-11-10 ENCOUNTER — INITIAL CONSULT (OUTPATIENT)
Dept: CARDIOTHORACIC SURGERY | Age: 65
End: 2020-11-10
Payer: COMMERCIAL

## 2020-11-10 VITALS
SYSTOLIC BLOOD PRESSURE: 152 MMHG | DIASTOLIC BLOOD PRESSURE: 63 MMHG | HEART RATE: 47 BPM | RESPIRATION RATE: 16 BRPM | WEIGHT: 262 LBS | HEIGHT: 69 IN | BODY MASS INDEX: 38.8 KG/M2

## 2020-11-10 DIAGNOSIS — Z01.818 PRE-OP EXAMINATION: Primary | ICD-10-CM

## 2020-11-10 DIAGNOSIS — I25.10 CAD IN NATIVE ARTERY: ICD-10-CM

## 2020-11-10 PROCEDURE — 99215 OFFICE O/P EST HI 40 MIN: CPT | Performed by: THORACIC SURGERY (CARDIOTHORACIC VASCULAR SURGERY)

## 2020-11-10 RX ORDER — SODIUM CHLORIDE 0.9 % (FLUSH) 0.9 %
10 SYRINGE (ML) INJECTION EVERY 12 HOURS SCHEDULED
Status: CANCELLED | OUTPATIENT
Start: 2020-11-10

## 2020-11-10 RX ORDER — SODIUM CHLORIDE 0.9 % (FLUSH) 0.9 %
10 SYRINGE (ML) INJECTION PRN
Status: CANCELLED | OUTPATIENT
Start: 2020-11-10

## 2020-11-10 RX ORDER — SODIUM CHLORIDE 9 MG/ML
INJECTION, SOLUTION INTRAVENOUS CONTINUOUS
Status: CANCELLED | OUTPATIENT
Start: 2020-11-10

## 2020-11-10 RX ORDER — CHLORHEXIDINE GLUCONATE ORAL RINSE 1.2 MG/ML
15 SOLUTION DENTAL ONCE
Status: CANCELLED | OUTPATIENT
Start: 2020-11-10 | End: 2020-11-10

## 2020-11-10 RX ORDER — CHLORHEXIDINE GLUCONATE 4 G/100ML
SOLUTION TOPICAL ONCE
Status: CANCELLED | OUTPATIENT
Start: 2020-11-10 | End: 2020-11-10

## 2020-11-10 ASSESSMENT — ENCOUNTER SYMPTOMS
COUGH: 0
SHORTNESS OF BREATH: 0

## 2020-11-10 NOTE — H&P
Christine Fernandes is a 63 y.o. male seen in hospital as a consult  10/24/2020. At that time with a history of PCI x 2 , Hx inferior MI (JACOB RCA 10/10); JACOB distal RCA (1/13); and then subsequent PTCA of ISR. He presented back last month for elective heart cath and poss PTCA [place] was loaded with Ely Kirkpatricka was also found to have LAD disease which was significant and was referred to us for consolation of CABG   He currently denies CP, SOB, JOSE, LE edema, N/V, F/C, orthopnea, PND and syncope.     Past Medical History:   Diagnosis Date    CAD (coronary artery disease)     Hyperlipidemia     Hypertension     [facility]ocardial infarct [facility])      Past Surgical History:   Procedure Laterality Date    CARDIAC CATHETERIZATION  10/22/2020    Dr. Junie Ram- EF 50-55%, PTA Distal RCA    COLONOSCOPY      CORONARY ANGIOPLASTY      CORONARY ANGIOPLASTY Right 06/06/2017    Dr. Enrique Reece PTCA-RCA-Rt Wrist   Camelia Shen  10-    Dr. Sky Jacob  1-    2.75 x 18mm Xience Distal RCA Dr. Garry Kidd Right 04/2016    POLYPECTOMY      TONSILLECTOMY       Social History     Tobacco Use    Smoking status: Never Smoker    Smokeless tobacco: Former User     Types: Snuff   Substance Use Topics    Alcohol use: Yes     Comment: occassionally    Drug use: No       No current facility-administered medications for this encounter. Current Outpatient Medications:     isosorbide mononitrate (IMDUR) 60 MG extended release tablet, Take 1 tablet by mouth daily, Disp: 30 tablet, Rfl: 3    ezetimibe (ZETIA) 10 MG tablet, Take 1 tablet by mouth nightly (Patient not taking: Reported on 11/10/2020), Disp: 30 tablet, Rfl: 3    ticagrelor (BRILINTA) 90 MG TABS tablet, Take 1 tablet by mouth 2 times daily, Disp: 60 tablet, Rfl: 2    gabapentin (NEURONTIN) 600 MG tablet, Take 600 mg by mouth 2 times daily.  , Disp: , Rfl:   nitroGLYCERIN (NITROSTAT) 0.4 MG SL tablet, , Disp: , Rfl: 1    citalopram (CELEXA) 20 MG tablet, Take 20 mg by mouth daily. , Disp: , Rfl:     aspirin 81 MG tablet, Take 81 mg by mouth daily. , Disp: , Rfl:     Cholecalciferol (VITAMIN D-3) 5000 UNITS TABS, Take 5,000 Units by mouth daily. , Disp: , Rfl:     atorvastatin (LIPITOR) 40 MG tablet, Take 40 mg by mouth daily. , Disp: , Rfl:     enalapril (VASOTEC) 10 MG tablet, Take 10 mg by mouth daily. , Disp: , Rfl:     metoprolol (LOPRESSOR) 25 MG tablet, Take 25 mg by mouth 2 times daily. , Disp: , Rfl:     No Known Allergies       Review of Systems   Constitutional: Negative for chills, fatigue and fever. Respiratory: Negative for cough and shortness of breath. Cardiovascular: Negative for chest pain, palpitations and leg swelling. Neurological: Negative for syncope and light-headedness.         Physical Exam  Constitutional:       Appearance: Normal appearance. Neck:      Musculoskeletal: Normal range of motion and neck supple. Cardiovascular:      Rate and Rhythm: Normal rate and regular rhythm. Pulses: Normal pulses. Pulmonary:      Effort: Pulmonary effort is normal.      Breath sounds: Normal breath sounds. Abdominal:      General: Bowel sounds are normal.   Musculoskeletal: Normal range of motion. General: No swelling. Neurological:      Mental Status: He is alert and oriented to person, place, and time.          Assessment:      CAD, S/P PTCA and PCI in the past                Plan:       PAT on 12/22 with OR 12/30 at 8am.  CABG +/- radial graft   All risks, benefits, alternatives and potential complications explained thoroughly including, but not limited to, bleeding, infection, lung injury, kidney injury, stroke, heart attack, prolonged ventilation, wound complication, need for re-operation, and death, and the patient agrees to proceed. Stop Brilinta 12/22.

## 2020-11-10 NOTE — PROGRESS NOTES
Subjective:      Chief Complaint   Patient presents with    Consultation     discuss surgery       Patient ID: Diana Danielle is a 63 y.o. male seen in hospital as a consult last month 10/24/2020. At that time with a history of PCI x 2 , Hx inferior MI (JACOB RCA 10/10); JACOB distal RCA (1/13); and then subsequent PTCA of ISR. He presented back last month for elective heart cath and poss PTCA  He was loaded with Brilinta. He was also found to have LAD disease which was significant and was referred to us for consolation of CABG   He currently denies CP, SOB, JOSE, LE edema, N/V, F/C, orthopnea, PND and syncope. HPI    Review of Systems   Constitutional: Negative for chills, fatigue and fever. Respiratory: Negative for cough and shortness of breath. Cardiovascular: Negative for chest pain, palpitations and leg swelling. Neurological: Negative for syncope and light-headedness. Objective:   Physical Exam  Constitutional:       Appearance: Normal appearance. Neck:      Musculoskeletal: Normal range of motion and neck supple. Cardiovascular:      Rate and Rhythm: Normal rate and regular rhythm. Pulses: Normal pulses. Pulmonary:      Effort: Pulmonary effort is normal.      Breath sounds: Normal breath sounds. Abdominal:      General: Bowel sounds are normal.   Musculoskeletal: Normal range of motion. General: No swelling. Neurological:      Mental Status: He is alert and oriented to person, place, and time. Assessment:      CAD, S/P PTCA and PCI in the past      Plan:      He has some work to do so PAT on 12/22 with OR 12/30 at 8am.  All risks, benefits, alternatives and potential complications explained thoroughly including, but not limited to, bleeding, infection, lung injury, kidney injury, stroke, heart attack, prolonged ventilation, wound complication, need for re-operation, and death, and the patient agrees to proceed. Stop Brilinta 12/22.         Mehdi Andrade MD

## 2020-11-11 NOTE — PROGRESS NOTES
Patient having covid testing at [facility] on 12/24/20. Patient asked to bring ID and to self quarantine until day of procedure.

## 2020-11-20 ENCOUNTER — TELEPHONE (OUTPATIENT)
Dept: CARDIOTHORACIC SURGERY | Age: 65
End: 2020-11-20

## 2020-11-20 NOTE — TELEPHONE ENCOUNTER
Informed pt of COVID testing 12/24 @ [place] 6:30-12  [email or web address] 12/23 7:30AM allie @ 7:15  Stop Brilinta 12/22

## 2020-12-17 RX ORDER — EZETIMIBE 10 MG/1
10 TABLET ORAL NIGHTLY
COMMUNITY

## 2020-12-17 NOTE — PROGRESS NOTES
[facility] 38 PRE-ADMISSION TESTING GENERAL INSTRUCTIONS- [facility]-phone number:[phone]    GENERAL INSTRUCTIONS    [x] Nothing by mouth after midnight, including gum, candy, mints, or water. [x] You may brush your teeth, gargle, but do NOT swallow water. [x]Hibiclens shower  the night before and the morning of surgery. Do not use             Hibiclens on your face or head. [x]No smoking, chewing tobacco, illegal drugs, or alcohol within 24 hours of your surgery. [] Jewelry, valuables or body piercing's should not be brought to the hospital. All body and/or tongue piercing's must be removed prior to arriving to hospital.  ALL hair pins must be removed. [x] Do not wear makeup, lotions, powders, deodorant. Nail polish as directed by the nurse. [x] Arrange transportation with a responsible adult  to and from the hospital. If you do not have a responsible adult  to transport you, you will need to make arrangements with a medical transportation company (i.e. [employer]. A Uber/taxi/bus is not appropriate unless you are accompanied by a responsible adult ). Arrange for someone to be with you for the remainder of the day and for 24 hours after your procedure due to having had anesthesia. Who will be your  for transportation?______Chrystal______   Who will be staying with you for 24 hrs after your procedure?______Chrystal______  [x] Bring insurance card and photo ID. [x] Transfusion Bracelet: Please bring with you to hospital, day of surgery  [x] CPAP/BI-PAP: Please bring your machine if you are to spend the night in the hospital.     PARKING INSTRUCTIONS:   [x] Arrival Time:__0500______  [x] Arrive to the [facility] entrance for surgery 12/30/20. You will be directed to pre op. [x] To reach the [facility] lobby from [address] [place] [address], upon entering the hospital, take elevator B to the 3rd floor.     EDUCATION INSTRUCTIONS: [x] Pre-admission Testing educational folder given  [x] Incentive Spirometry,coughing & deep breathing exercises reviewed. [x]Medication information sheet(s)   [x]Fluoroscopy-Xray used in surgery reviewed with patient. Educational pamphlet placed in chart. [x]Pain: Post-op pain is normal and to be expected. You will be asked to rate your pain from 0-10(a zero is not acceptable-education is needed). Your post-op pain goal is:  [x] Ask your nurse for your pain medication. [] Other:___________________________    MEDICATION INSTRUCTIONS:   [x]Bring a complete list of your medications, please write the last time you took the medicine, give this list to the nurse. [x] Take the following medications the morning of surgery with 1-2 ounces of water: SEE LIST  [x] Stop herbal supplements and vitamins 5 days before your surgery. [x] Follow physician instructions regarding any blood thinners you may be taking. WHAT TO EXPECT:  [x] The day of surgery you will be greeted and checked in by the [employer].  In addition, you will be registered in the [facility] by a Patient Access Representative. Please bring your photo ID and insurance card. A nurse will greet you in accordance to the time you are needed in the pre-op area to prepare you for surgery. Please do not be discouraged if you are not greeted in the order you arrive as there are many variables that are involved in patient preparation. Your patience is greatly appreciated as you wait for your nurse. Please bring in items such as: books, magazines, newspapers, electronics, or any other items  to occupy your time in the waiting area. [x]  Delays may occur with surgery and staff will make a sincere effort to keep you informed of delays. If any delays occur with your procedure, we apologize ahead of time for your inconvenience as we recognize the value of your time.

## 2020-12-23 ENCOUNTER — HOSPITAL ENCOUNTER (OUTPATIENT)
Dept: INTERVENTIONAL RADIOLOGY/VASCULAR | Age: 65
Discharge: HOME | End: 2020-12-25
Payer: COMMERCIAL

## 2020-12-23 ENCOUNTER — HOSPITAL ENCOUNTER (OUTPATIENT)
Age: 65
Discharge: HOME | End: 2020-12-25
Payer: COMMERCIAL

## 2020-12-23 ENCOUNTER — HOSPITAL ENCOUNTER (OUTPATIENT)
Dept: ULTRASOUND IMAGING | Age: 65
Discharge: HOME | End: 2020-12-25
Payer: COMMERCIAL

## 2020-12-23 ENCOUNTER — HOSPITAL ENCOUNTER (OUTPATIENT)
Dept: PREADMISSION TESTING | Age: 65
Discharge: HOME | End: 2020-12-23
Payer: COMMERCIAL

## 2020-12-23 ENCOUNTER — HOSPITAL ENCOUNTER (OUTPATIENT)
Dept: PULMONOLOGY | Age: 65
Discharge: HOME | End: 2020-12-23
Payer: COMMERCIAL

## 2020-12-23 ENCOUNTER — HOSPITAL ENCOUNTER (OUTPATIENT)
Dept: GENERAL RADIOLOGY | Age: 65
Discharge: HOME | End: 2020-12-25
Payer: COMMERCIAL

## 2020-12-23 ENCOUNTER — HOSPITAL ENCOUNTER (OUTPATIENT)
Dept: CT IMAGING | Age: 65
Discharge: HOME | End: 2020-12-25
Payer: COMMERCIAL

## 2020-12-23 VITALS
RESPIRATION RATE: 20 BRPM | BODY MASS INDEX: 38.65 KG/M2 | HEIGHT: 68 IN | DIASTOLIC BLOOD PRESSURE: 63 MMHG | TEMPERATURE: 97.8 F | OXYGEN SATURATION: 97 % | WEIGHT: 255 LBS | HEART RATE: 60 BPM | SYSTOLIC BLOOD PRESSURE: 134 MMHG

## 2020-12-23 LAB
ABO/RH: NORMAL
ALBUMIN SERPL-MCNC: 3.9 G/DL (ref 3.5–5.2)
ALP BLD-CCNC: 81 U/L (ref 40–129)
ALT SERPL-CCNC: 22 U/L (ref 0–40)
ANION GAP SERPL CALCULATED.3IONS-SCNC: 10 MMOL/L (ref 7–16)
ANTIBODY SCREEN: NORMAL
APTT: 31.9 SEC (ref 24.5–35.1)
AST SERPL-CCNC: 18 U/L (ref 0–39)
BILIRUB SERPL-MCNC: 0.6 MG/DL (ref 0–1.2)
BILIRUBIN URINE: NEGATIVE
BLOOD, URINE: NEGATIVE
BUN BLDV-MCNC: 13 MG/DL (ref 8–23)
CALCIUM SERPL-MCNC: 9.1 MG/DL (ref 8.6–10.2)
CHLORIDE BLD-SCNC: 107 MMOL/L (ref 98–107)
CLARITY: CLEAR
CO2: 23 MMOL/L (ref 22–29)
COLOR: YELLOW
CREAT SERPL-MCNC: 0.8 MG/DL (ref 0.7–1.2)
GFR AFRICAN AMERICAN: >60
GFR NON-AFRICAN AMERICAN: >60 ML/MIN/1.73
GLUCOSE BLD-MCNC: 116 MG/DL (ref 74–99)
GLUCOSE URINE: NEGATIVE MG/DL
HBA1C MFR BLD: 6.1 % (ref 4–5.6)
HCT VFR BLD CALC: 38.7 % (ref 37–54)
HEMOGLOBIN: 13.2 G/DL (ref 12.5–16.5)
INR BLD: 1
KETONES, URINE: NEGATIVE MG/DL
LEUKOCYTE ESTERASE, URINE: NEGATIVE
MCH RBC QN AUTO: 28.9 PG (ref 26–35)
MCHC RBC AUTO-ENTMCNC: 34.1 % (ref 32–34.5)
MCV RBC AUTO: 84.7 FL (ref 80–99.9)
NITRITE, URINE: NEGATIVE
PDW BLD-RTO: 13.9 FL (ref 11.5–15)
PH UA: 6 (ref 5–9)
PLATELET # BLD: 181 E9/L (ref 130–450)
PMV BLD AUTO: 10.2 FL (ref 7–12)
POTASSIUM SERPL-SCNC: 4.2 MMOL/L (ref 3.5–5)
PROTEIN UA: NEGATIVE MG/DL
PROTHROMBIN TIME: 11.6 SEC (ref 9.3–12.4)
RBC # BLD: 4.57 E12/L (ref 3.8–5.8)
SODIUM BLD-SCNC: 140 MMOL/L (ref 132–146)
SPECIFIC GRAVITY UA: >=1.03 (ref 1–1.03)
TOTAL PROTEIN: 6.3 G/DL (ref 6.4–8.3)
UROBILINOGEN, URINE: 4 E.U./DL
WBC # BLD: 4.5 E9/L (ref 4.5–11.5)

## 2020-12-23 PROCEDURE — U0003 INFECTIOUS AGENT DETECTION BY NUCLEIC ACID (DNA OR RNA); SEVERE ACUTE RESPIRATORY SYNDROME CORONAVIRUS 2 (SARS-COV-2) (CORONAVIRUS DISEASE [COVID-19]), AMPLIFIED PROBE TECHNIQUE, MAKING USE OF HIGH THROUGHPUT TECHNOLOGIES AS DESCRIBED BY CMS-2020-01-R: HCPCS

## 2020-12-23 PROCEDURE — 93970 EXTREMITY STUDY: CPT | Performed by: RADIOLOGY

## 2020-12-23 PROCEDURE — 71250 CT THORAX DX C-: CPT

## 2020-12-23 PROCEDURE — 81003 URINALYSIS AUTO W/O SCOPE: CPT

## 2020-12-23 PROCEDURE — 86901 BLOOD TYPING SEROLOGIC RH(D): CPT

## 2020-12-23 PROCEDURE — 86900 BLOOD TYPING SEROLOGIC ABO: CPT

## 2020-12-23 PROCEDURE — 71046 X-RAY EXAM CHEST 2 VIEWS: CPT

## 2020-12-23 PROCEDURE — 93880 EXTRACRANIAL BILAT STUDY: CPT

## 2020-12-23 PROCEDURE — 93922 UPR/L XTREMITY ART 2 LEVELS: CPT

## 2020-12-23 PROCEDURE — 83036 HEMOGLOBIN GLYCOSYLATED A1C: CPT

## 2020-12-23 PROCEDURE — 86923 COMPATIBILITY TEST ELECTRIC: CPT

## 2020-12-23 PROCEDURE — 85027 COMPLETE CBC AUTOMATED: CPT

## 2020-12-23 PROCEDURE — 36415 COLL VENOUS BLD VENIPUNCTURE: CPT

## 2020-12-23 PROCEDURE — 80053 COMPREHEN METABOLIC PANEL: CPT

## 2020-12-23 PROCEDURE — 87081 CULTURE SCREEN ONLY: CPT

## 2020-12-23 PROCEDURE — 85610 PROTHROMBIN TIME: CPT

## 2020-12-23 PROCEDURE — 93923 UPR/LXTR ART STDY 3+ LVLS: CPT

## 2020-12-23 PROCEDURE — 94375 RESPIRATORY FLOW VOLUME LOOP: CPT

## 2020-12-23 PROCEDURE — 93880 EXTRACRANIAL BILAT STUDY: CPT | Performed by: RADIOLOGY

## 2020-12-23 PROCEDURE — 94729 DIFFUSING CAPACITY: CPT

## 2020-12-23 PROCEDURE — 93970 EXTREMITY STUDY: CPT

## 2020-12-23 PROCEDURE — 87088 URINE BACTERIA CULTURE: CPT

## 2020-12-23 PROCEDURE — 86850 RBC ANTIBODY SCREEN: CPT

## 2020-12-23 PROCEDURE — 93005 ELECTROCARDIOGRAM TRACING: CPT | Performed by: THORACIC SURGERY (CARDIOTHORACIC VASCULAR SURGERY)

## 2020-12-23 PROCEDURE — 85730 THROMBOPLASTIN TIME PARTIAL: CPT

## 2020-12-24 LAB
MRSA CULTURE ONLY: NORMAL
SARS-COV-2: NOT DETECTED
SOURCE: NORMAL

## 2020-12-25 LAB
EKG ATRIAL RATE: 47 BPM
EKG P AXIS: 17 DEGREES
EKG P-R INTERVAL: 196 MS
EKG Q-T INTERVAL: 422 MS
EKG QRS DURATION: 88 MS
EKG QTC CALCULATION (BAZETT): 410 MS
EKG R AXIS: 14 DEGREES
EKG T AXIS: 20 DEGREES
EKG VENTRICULAR RATE: 57 BPM
URINE CULTURE, ROUTINE: NORMAL

## 2020-12-25 PROCEDURE — 93010 ELECTROCARDIOGRAM REPORT: CPT | Performed by: INTERNAL MEDICINE

## 2020-12-29 ENCOUNTER — ANESTHESIA EVENT (OUTPATIENT)
Dept: OPERATING ROOM | Age: 65
DRG: 236 | End: 2020-12-29
Payer: COMMERCIAL

## 2020-12-30 ENCOUNTER — ANESTHESIA (OUTPATIENT)
Dept: OPERATING ROOM | Age: 65
DRG: 236 | End: 2020-12-30
Payer: COMMERCIAL

## 2020-12-30 ENCOUNTER — APPOINTMENT (OUTPATIENT)
Dept: GENERAL RADIOLOGY | Age: 65
DRG: 236 | End: 2020-12-30
Attending: THORACIC SURGERY (CARDIOTHORACIC VASCULAR SURGERY)
Payer: COMMERCIAL

## 2020-12-30 ENCOUNTER — HOSPITAL ENCOUNTER (INPATIENT)
Age: 65
LOS: 5 days | Discharge: HOME | DRG: 236 | End: 2021-01-04
Attending: THORACIC SURGERY (CARDIOTHORACIC VASCULAR SURGERY) | Admitting: THORACIC SURGERY (CARDIOTHORACIC VASCULAR SURGERY)
Payer: COMMERCIAL

## 2020-12-30 VITALS
SYSTOLIC BLOOD PRESSURE: 162 MMHG | TEMPERATURE: 98.4 F | RESPIRATION RATE: 16 BRPM | DIASTOLIC BLOOD PRESSURE: 81 MMHG | OXYGEN SATURATION: 96 %

## 2020-12-30 DIAGNOSIS — Z95.1 S/P CABG (CORONARY ARTERY BYPASS GRAFT): ICD-10-CM

## 2020-12-30 DIAGNOSIS — U07.1 COVID-19: Primary | ICD-10-CM

## 2020-12-30 DIAGNOSIS — G89.18 ACUTE POST-OPERATIVE PAIN: ICD-10-CM

## 2020-12-30 LAB
AADO2: 160.4 MMHG
AADO2: 176.7 MMHG
AADO2: 186.8 MMHG
AADO2: 210.1 MMHG
ACTIVATED CLOTTING TIME: 124 SECONDS (ref 99–130)
ACTIVATED CLOTTING TIME: 528 SECONDS (ref 99–130)
ACTIVATED CLOTTING TIME: 580 SECONDS (ref 99–130)
ACTIVATED CLOTTING TIME: 584 SECONDS (ref 99–130)
ACTIVATED CLOTTING TIME: 92 SECONDS (ref 99–130)
ANION GAP SERPL CALCULATED.3IONS-SCNC: 9 MMOL/L (ref 7–16)
APTT: 41.6 SEC (ref 24.5–35.1)
B.E.: -2.5 MMOL/L (ref -3–0)
B.E.: -3.1 MMOL/L (ref -3–3)
B.E.: -3.1 MMOL/L (ref -3–3)
B.E.: -4.2 MMOL/L (ref -3–0)
B.E.: -5.6 MMOL/L (ref -3–3)
B.E.: -6.4 MMOL/L (ref -3–3)
BUN BLDV-MCNC: 15 MG/DL (ref 8–23)
CALCIUM SERPL-MCNC: 7.5 MG/DL (ref 8.6–10.2)
CARDIOPULMONARY BYPASS: YES
CARDIOPULMONARY BYPASS: YES
CHLORIDE BLD-SCNC: 105 MMOL/L (ref 98–107)
CO2: 22 MMOL/L (ref 22–29)
COHB: 0.8 % (ref 0–1.5)
COHB: 1 % (ref 0–1.5)
COHB: 1.2 % (ref 0–1.5)
COHB: 1.3 % (ref 0–1.5)
CREAT SERPL-MCNC: 0.9 MG/DL (ref 0.7–1.2)
CRITICAL: ABNORMAL
DATE ANALYZED: ABNORMAL
DATE OF COLLECTION: ABNORMAL
DEVICE: ABNORMAL
DEVICE: ABNORMAL
FIO2 ARTERIAL: 70
FIO2 ARTERIAL: 70
FIO2: 50 %
GFR AFRICAN AMERICAN: >60
GFR NON-AFRICAN AMERICAN: >60 ML/MIN/1.73
GLUCOSE BLD-MCNC: 155 MG/DL (ref 74–99)
HCO3 ARTERIAL: 21.2 MMOL/L (ref 22–26)
HCO3 ARTERIAL: 22.9 MMOL/L (ref 22–26)
HCO3: 20.7 MMOL/L (ref 22–26)
HCO3: 21.1 MMOL/L (ref 22–26)
HCO3: 23.5 MMOL/L (ref 22–26)
HCO3: 24 MMOL/L (ref 22–26)
HCT (EST): 28 % (ref 37–54)
HCT (EST): 32 % (ref 37–54)
HCT VFR BLD CALC: 42.2 % (ref 37–54)
HEMOGLOBIN: 14.1 G/DL (ref 12.5–16.5)
HGB, (EST): 10.8 G/DL (ref 12.5–15.5)
HGB, (EST): 9.4 G/DL (ref 12.5–15.5)
HHB: 2.1 % (ref 0–5)
HHB: 2.6 % (ref 0–5)
HHB: 2.6 % (ref 0–5)
HHB: 5.8 % (ref 0–5)
INR BLD: 1.1
LAB: ABNORMAL
MAGNESIUM: 2.4 MG/DL (ref 1.6–2.6)
MCH RBC QN AUTO: 28.5 PG (ref 26–35)
MCHC RBC AUTO-ENTMCNC: 33.4 % (ref 32–34.5)
MCV RBC AUTO: 85.4 FL (ref 80–99.9)
METER GLUCOSE: 151 MG/DL (ref 74–99)
METER GLUCOSE: 152 MG/DL (ref 74–99)
METER GLUCOSE: 154 MG/DL (ref 74–99)
METER GLUCOSE: 155 MG/DL (ref 74–99)
METER GLUCOSE: 193 MG/DL (ref 74–99)
METER GLUCOSE: 218 MG/DL (ref 74–99)
METER GLUCOSE: 219 MG/DL (ref 74–99)
METER GLUCOSE: 260 MG/DL (ref 74–99)
METHB: 0.1 % (ref 0–1.5)
METHB: 0.3 % (ref 0–1.5)
MODE: ABNORMAL
MODE: ABNORMAL
MODE: AC
MODE: AC
O2 SATURATION: 100 % (ref 92–98.5)
O2 SATURATION: 94.1 % (ref 92–98.5)
O2 SATURATION: 97.4 % (ref 92–98.5)
O2 SATURATION: 97.4 % (ref 92–98.5)
O2 SATURATION: 97.9 % (ref 92–98.5)
O2 SATURATION: 99.9 % (ref 92–98.5)
O2HB: 92.7 % (ref 94–97)
O2HB: 96 % (ref 94–97)
O2HB: 96.3 % (ref 94–97)
O2HB: 96.6 % (ref 94–97)
OPERATOR ID: 4510
OPERATOR ID: 4510
OPERATOR ID: 467
PATIENT TEMP: 37 C
PCO2 ARTERIAL: 39.1 MMHG (ref 35–45)
PCO2 ARTERIAL: 41.2 MMHG (ref 35–45)
PCO2: 45.7 MMHG (ref 35–45)
PCO2: 47 MMHG (ref 35–45)
PCO2: 47.7 MMHG (ref 35–45)
PCO2: 51.2 MMHG (ref 35–45)
PDW BLD-RTO: 13.7 FL (ref 11.5–15)
PEEP/CPAP: 8 CMH2O
PFO2: 1.62 MMHG/%
PFO2: 2.07 MMHG/%
PFO2: 2.32 MMHG/%
PFO2: 2.52 MMHG/%
PH BLOOD GAS: 7.26 (ref 7.35–7.45)
PH BLOOD GAS: 7.28 (ref 7.35–7.45)
PH BLOOD GAS: 7.29 (ref 7.35–7.45)
PH BLOOD GAS: 7.31 (ref 7.35–7.45)
PH BLOOD GAS: 7.34 (ref 7.35–7.45)
PH BLOOD GAS: 7.35 (ref 7.35–7.45)
PLATELET # BLD: 130 E9/L (ref 130–450)
PMV BLD AUTO: 9.8 FL (ref 7–12)
PO2 ARTERIAL: 354 MMHG (ref 60–80)
PO2 ARTERIAL: 390.2 MMHG (ref 60–80)
PO2: 103.5 MMHG (ref 75–100)
PO2: 115.9 MMHG (ref 75–100)
PO2: 126 MMHG (ref 75–100)
PO2: 81 MMHG (ref 75–100)
POTASSIUM SERPL-SCNC: 4.3 MMOL/L (ref 3.5–5)
POTASSIUM SERPL-SCNC: 4.4 MMOL/L (ref 3.5–5)
POTASSIUM SERPL-SCNC: 4.44 MMOL/L (ref 3.5–5)
POTASSIUM SERPL-SCNC: 4.5 MMOL/L (ref 3.5–5.5)
POTASSIUM SERPL-SCNC: 4.6 MMOL/L (ref 3.5–5)
POTASSIUM SERPL-SCNC: 5.1 MMOL/L (ref 3.5–5.5)
PROTHROMBIN TIME: 12.4 SEC (ref 9.3–12.4)
PS: 10 CMH20
PS: 15 CMH20
RBC # BLD: 4.94 E12/L (ref 3.8–5.8)
RI(T): 1.27
RI(T): 1.52
RI(T): 1.81
RI(T): 2.59
RR MECHANICAL: 14 B/MIN
RR MECHANICAL: 18 B/MIN
SODIUM BLD-SCNC: 136 MMOL/L (ref 132–146)
SOURCE, BLOOD GAS: ABNORMAL
THB: 13.6 G/DL (ref 11.5–16.5)
THB: 13.7 G/DL (ref 11.5–16.5)
THB: 14 G/DL (ref 11.5–16.5)
THB: 14.8 G/DL (ref 11.5–16.5)
TIME ANALYZED: 1110
TIME ANALYZED: 1213
TIME ANALYZED: 1306
TIME ANALYZED: 1419
VT MECHANICAL: 500 ML
VT MECHANICAL: 500 ML
WBC # BLD: 15.4 E9/L (ref 4.5–11.5)

## 2020-12-30 PROCEDURE — 021009W BYPASS CORONARY ARTERY, ONE ARTERY FROM AORTA WITH AUTOLOGOUS VENOUS TISSUE, OPEN APPROACH: ICD-10-PCS | Performed by: THORACIC SURGERY (CARDIOTHORACIC VASCULAR SURGERY)

## 2020-12-30 PROCEDURE — 33517 CABG ARTERY-VEIN SINGLE: CPT | Performed by: THORACIC SURGERY (CARDIOTHORACIC VASCULAR SURGERY)

## 2020-12-30 PROCEDURE — C9113 INJ PANTOPRAZOLE SODIUM, VIA: HCPCS | Performed by: THORACIC SURGERY (CARDIOTHORACIC VASCULAR SURGERY)

## 2020-12-30 PROCEDURE — 83735 ASSAY OF MAGNESIUM: CPT

## 2020-12-30 PROCEDURE — 94640 AIRWAY INHALATION TREATMENT: CPT

## 2020-12-30 PROCEDURE — C9248 INJ, CLEVIDIPINE BUTYRATE: HCPCS | Performed by: NURSE ANESTHETIST, CERTIFIED REGISTERED

## 2020-12-30 PROCEDURE — 6370000000 HC RX 637 (ALT 250 FOR IP): Performed by: THORACIC SURGERY (CARDIOTHORACIC VASCULAR SURGERY)

## 2020-12-30 PROCEDURE — 6370000000 HC RX 637 (ALT 250 FOR IP): Performed by: INTERNAL MEDICINE

## 2020-12-30 PROCEDURE — 5A1221Z PERFORMANCE OF CARDIAC OUTPUT, CONTINUOUS: ICD-10-PCS | Performed by: THORACIC SURGERY (CARDIOTHORACIC VASCULAR SURGERY)

## 2020-12-30 PROCEDURE — 6360000002 HC RX W HCPCS: Performed by: NURSE ANESTHETIST, CERTIFIED REGISTERED

## 2020-12-30 PROCEDURE — 71045 X-RAY EXAM CHEST 1 VIEW: CPT

## 2020-12-30 PROCEDURE — 85730 THROMBOPLASTIN TIME PARTIAL: CPT

## 2020-12-30 PROCEDURE — 6360000002 HC RX W HCPCS: Performed by: THORACIC SURGERY (CARDIOTHORACIC VASCULAR SURGERY)

## 2020-12-30 PROCEDURE — 94002 VENT MGMT INPAT INIT DAY: CPT

## 2020-12-30 PROCEDURE — 2720000010 HC SURG SUPPLY STERILE: Performed by: THORACIC SURGERY (CARDIOTHORACIC VASCULAR SURGERY)

## 2020-12-30 PROCEDURE — 2580000003 HC RX 258: Performed by: THORACIC SURGERY (CARDIOTHORACIC VASCULAR SURGERY)

## 2020-12-30 PROCEDURE — 02L70CK OCCLUSION OF LEFT ATRIAL APPENDAGE WITH EXTRALUMINAL DEVICE, OPEN APPROACH: ICD-10-PCS | Performed by: THORACIC SURGERY (CARDIOTHORACIC VASCULAR SURGERY)

## 2020-12-30 PROCEDURE — C1729 CATH, DRAINAGE: HCPCS | Performed by: THORACIC SURGERY (CARDIOTHORACIC VASCULAR SURGERY)

## 2020-12-30 PROCEDURE — 2500000003 HC RX 250 WO HCPCS: Performed by: NURSE ANESTHETIST, CERTIFIED REGISTERED

## 2020-12-30 PROCEDURE — 0PH000Z INSERTION OF RIGID PLATE INTERNAL FIXATION DEVICE INTO STERNUM, OPEN APPROACH: ICD-10-PCS | Performed by: THORACIC SURGERY (CARDIOTHORACIC VASCULAR SURGERY)

## 2020-12-30 PROCEDURE — 36415 COLL VENOUS BLD VENIPUNCTURE: CPT

## 2020-12-30 PROCEDURE — 94664 DEMO&/EVAL PT USE INHALER: CPT

## 2020-12-30 PROCEDURE — 94660 CPAP INITIATION&MGMT: CPT

## 2020-12-30 PROCEDURE — B24BZZ4 ULTRASONOGRAPHY OF HEART WITH AORTA, TRANSESOPHAGEAL: ICD-10-PCS | Performed by: ANESTHESIOLOGY

## 2020-12-30 PROCEDURE — 33533 CABG ARTERIAL SINGLE: CPT | Performed by: NURSE PRACTITIONER

## 2020-12-30 PROCEDURE — C1713 ANCHOR/SCREW BN/BN,TIS/BN: HCPCS | Performed by: THORACIC SURGERY (CARDIOTHORACIC VASCULAR SURGERY)

## 2020-12-30 PROCEDURE — 99232 SBSQ HOSP IP/OBS MODERATE 35: CPT | Performed by: INTERNAL MEDICINE

## 2020-12-30 PROCEDURE — 5A1945Z RESPIRATORY VENTILATION, 24-96 CONSECUTIVE HOURS: ICD-10-PCS | Performed by: THORACIC SURGERY (CARDIOTHORACIC VASCULAR SURGERY)

## 2020-12-30 PROCEDURE — P9045 ALBUMIN (HUMAN), 5%, 250 ML: HCPCS | Performed by: NURSE PRACTITIONER

## 2020-12-30 PROCEDURE — 05HN33Z INSERTION OF INFUSION DEVICE INTO LEFT INTERNAL JUGULAR VEIN, PERCUTANEOUS APPROACH: ICD-10-PCS | Performed by: ANESTHESIOLOGY

## 2020-12-30 PROCEDURE — 99233 SBSQ HOSP IP/OBS HIGH 50: CPT | Performed by: NURSE PRACTITIONER

## 2020-12-30 PROCEDURE — APPSS60 APP SPLIT SHARED TIME 46-60 MINUTES: Performed by: PHYSICIAN ASSISTANT

## 2020-12-30 PROCEDURE — 2000000000 HC ICU R&B

## 2020-12-30 PROCEDURE — 33533 CABG ARTERIAL SINGLE: CPT | Performed by: THORACIC SURGERY (CARDIOTHORACIC VASCULAR SURGERY)

## 2020-12-30 PROCEDURE — 2500000003 HC RX 250 WO HCPCS: Performed by: NURSE PRACTITIONER

## 2020-12-30 PROCEDURE — 0BH17EZ INSERTION OF ENDOTRACHEAL AIRWAY INTO TRACHEA, VIA NATURAL OR ARTIFICIAL OPENING: ICD-10-PCS | Performed by: THORACIC SURGERY (CARDIOTHORACIC VASCULAR SURGERY)

## 2020-12-30 PROCEDURE — 84132 ASSAY OF SERUM POTASSIUM: CPT

## 2020-12-30 PROCEDURE — 33517 CABG ARTERY-VEIN SINGLE: CPT | Performed by: NURSE PRACTITIONER

## 2020-12-30 PROCEDURE — 82803 BLOOD GASES ANY COMBINATION: CPT

## 2020-12-30 PROCEDURE — A4648 IMPLANTABLE TISSUE MARKER: HCPCS | Performed by: THORACIC SURGERY (CARDIOTHORACIC VASCULAR SURGERY)

## 2020-12-30 PROCEDURE — 3700000000 HC ANESTHESIA ATTENDED CARE: Performed by: THORACIC SURGERY (CARDIOTHORACIC VASCULAR SURGERY)

## 2020-12-30 PROCEDURE — 6370000000 HC RX 637 (ALT 250 FOR IP): Performed by: NURSE ANESTHETIST, CERTIFIED REGISTERED

## 2020-12-30 PROCEDURE — 06BP0ZZ EXCISION OF RIGHT SAPHENOUS VEIN, OPEN APPROACH: ICD-10-PCS | Performed by: THORACIC SURGERY (CARDIOTHORACIC VASCULAR SURGERY)

## 2020-12-30 PROCEDURE — 02100Z9 BYPASS CORONARY ARTERY, ONE ARTERY FROM LEFT INTERNAL MAMMARY, OPEN APPROACH: ICD-10-PCS | Performed by: THORACIC SURGERY (CARDIOTHORACIC VASCULAR SURGERY)

## 2020-12-30 PROCEDURE — 3700000001 HC ADD 15 MINUTES (ANESTHESIA): Performed by: THORACIC SURGERY (CARDIOTHORACIC VASCULAR SURGERY)

## 2020-12-30 PROCEDURE — 6360000002 HC RX W HCPCS: Performed by: NURSE PRACTITIONER

## 2020-12-30 PROCEDURE — 85610 PROTHROMBIN TIME: CPT

## 2020-12-30 PROCEDURE — 85347 COAGULATION TIME ACTIVATED: CPT

## 2020-12-30 PROCEDURE — 85027 COMPLETE CBC AUTOMATED: CPT

## 2020-12-30 PROCEDURE — 37799 UNLISTED PX VASCULAR SURGERY: CPT

## 2020-12-30 PROCEDURE — 80048 BASIC METABOLIC PNL TOTAL CA: CPT

## 2020-12-30 PROCEDURE — 3600000018 HC SURGERY OHS ADDTL 15MIN: Performed by: THORACIC SURGERY (CARDIOTHORACIC VASCULAR SURGERY)

## 2020-12-30 PROCEDURE — 82805 BLOOD GASES W/O2 SATURATION: CPT

## 2020-12-30 PROCEDURE — 82962 GLUCOSE BLOOD TEST: CPT

## 2020-12-30 PROCEDURE — 3600000008 HC SURGERY OHS BASE: Performed by: THORACIC SURGERY (CARDIOTHORACIC VASCULAR SURGERY)

## 2020-12-30 PROCEDURE — 2580000003 HC RX 258: Performed by: NURSE ANESTHETIST, CERTIFIED REGISTERED

## 2020-12-30 PROCEDURE — 2709999900 HC NON-CHARGEABLE SUPPLY: Performed by: THORACIC SURGERY (CARDIOTHORACIC VASCULAR SURGERY)

## 2020-12-30 DEVICE — DEVICE OCCL CLP L40MM PLUNG GRP FLX SHFT FOR GILLINOV: Type: IMPLANTABLE DEVICE | Site: HEART | Status: FUNCTIONAL

## 2020-12-30 DEVICE — SCREW BNE L15MM DIA2.3MM THOR STRNL TI LOK DRL FREE LEV 1: Type: IMPLANTABLE DEVICE | Site: STERNUM | Status: FUNCTIONAL

## 2020-12-30 DEVICE — SCREW BNE L17MM DIA2.3MM THOR STRNL TI LOK DRL FREE LEV 1: Type: IMPLANTABLE DEVICE | Site: STERNUM | Status: FUNCTIONAL

## 2020-12-30 DEVICE — PLATE BONE 1.8MM THICKNESS STRNL LCK 6 H CP TI: Type: IMPLANTABLE DEVICE | Site: STERNUM | Status: FUNCTIONAL

## 2020-12-30 DEVICE — PLATE LCK STRNL 8-H LAD T 1.8MM: Type: IMPLANTABLE DEVICE | Site: STERNUM | Status: FUNCTIONAL

## 2020-12-30 RX ORDER — PANTOPRAZOLE SODIUM 40 MG/10ML
40 INJECTION, POWDER, LYOPHILIZED, FOR SOLUTION INTRAVENOUS DAILY
Status: COMPLETED | OUTPATIENT
Start: 2020-12-30 | End: 2020-12-30

## 2020-12-30 RX ORDER — SODIUM CHLORIDE 9 MG/ML
INJECTION, SOLUTION INTRAVENOUS CONTINUOUS
Status: DISCONTINUED | OUTPATIENT
Start: 2020-12-30 | End: 2020-12-30

## 2020-12-30 RX ORDER — PROPOFOL 10 MG/ML
INJECTION, EMULSION INTRAVENOUS
Status: DISPENSED
Start: 2020-12-30 | End: 2020-12-30

## 2020-12-30 RX ORDER — SODIUM CHLORIDE 0.9 % (FLUSH) 0.9 %
10 SYRINGE (ML) INJECTION PRN
Status: DISCONTINUED | OUTPATIENT
Start: 2020-12-30 | End: 2020-12-30 | Stop reason: HOSPADM

## 2020-12-30 RX ORDER — SODIUM CHLORIDE 0.9 % (FLUSH) 0.9 %
10 SYRINGE (ML) INJECTION PRN
Status: DISCONTINUED | OUTPATIENT
Start: 2020-12-30 | End: 2021-01-04 | Stop reason: HOSPADM

## 2020-12-30 RX ORDER — FENTANYL CITRATE 50 UG/ML
25 INJECTION, SOLUTION INTRAMUSCULAR; INTRAVENOUS
Status: DISCONTINUED | OUTPATIENT
Start: 2020-12-30 | End: 2020-12-31

## 2020-12-30 RX ORDER — CHOLECALCIFEROL (VITAMIN D3) 50 MCG
5000 TABLET ORAL DAILY
Status: DISCONTINUED | OUTPATIENT
Start: 2020-12-30 | End: 2021-01-04 | Stop reason: HOSPADM

## 2020-12-30 RX ORDER — PANTOPRAZOLE SODIUM 40 MG/1
40 TABLET, DELAYED RELEASE ORAL DAILY
Status: DISCONTINUED | OUTPATIENT
Start: 2020-12-31 | End: 2021-01-04 | Stop reason: HOSPADM

## 2020-12-30 RX ORDER — SODIUM CHLORIDE, SODIUM LACTATE, POTASSIUM CHLORIDE, CALCIUM CHLORIDE 600; 310; 30; 20 MG/100ML; MG/100ML; MG/100ML; MG/100ML
INJECTION, SOLUTION INTRAVENOUS CONTINUOUS PRN
Status: DISCONTINUED | OUTPATIENT
Start: 2020-12-30 | End: 2020-12-30 | Stop reason: SDUPTHER

## 2020-12-30 RX ORDER — HEPARIN SODIUM 10000 [USP'U]/ML
INJECTION, SOLUTION INTRAVENOUS; SUBCUTANEOUS PRN
Status: DISCONTINUED | OUTPATIENT
Start: 2020-12-30 | End: 2020-12-30 | Stop reason: SDUPTHER

## 2020-12-30 RX ORDER — MAGNESIUM SULFATE IN WATER 40 MG/ML
2 INJECTION, SOLUTION INTRAVENOUS PRN
Status: DISCONTINUED | OUTPATIENT
Start: 2020-12-30 | End: 2020-12-31

## 2020-12-30 RX ORDER — CITALOPRAM 20 MG/1
20 TABLET ORAL DAILY
Status: DISCONTINUED | OUTPATIENT
Start: 2020-12-30 | End: 2021-01-04 | Stop reason: HOSPADM

## 2020-12-30 RX ORDER — ONDANSETRON 2 MG/ML
4 INJECTION INTRAMUSCULAR; INTRAVENOUS EVERY 8 HOURS PRN
Status: DISCONTINUED | OUTPATIENT
Start: 2020-12-30 | End: 2021-01-04 | Stop reason: HOSPADM

## 2020-12-30 RX ORDER — LIDOCAINE HYDROCHLORIDE 20 MG/ML
INJECTION, SOLUTION INFILTRATION; PERINEURAL PRN
Status: DISCONTINUED | OUTPATIENT
Start: 2020-12-30 | End: 2020-12-30 | Stop reason: SDUPTHER

## 2020-12-30 RX ORDER — HYDROMORPHONE HCL 110MG/55ML
PATIENT CONTROLLED ANALGESIA SYRINGE INTRAVENOUS PRN
Status: DISCONTINUED | OUTPATIENT
Start: 2020-12-30 | End: 2020-12-30 | Stop reason: SDUPTHER

## 2020-12-30 RX ORDER — ALBUMIN, HUMAN INJ 5% 5 %
25 SOLUTION INTRAVENOUS ONCE
Status: COMPLETED | OUTPATIENT
Start: 2020-12-30 | End: 2020-12-30

## 2020-12-30 RX ORDER — GLYCOPYRROLATE 1 MG/5 ML
SYRINGE (ML) INTRAVENOUS PRN
Status: DISCONTINUED | OUTPATIENT
Start: 2020-12-30 | End: 2020-12-30 | Stop reason: SDUPTHER

## 2020-12-30 RX ORDER — DEXTROSE MONOHYDRATE 25 G/50ML
12.5 INJECTION, SOLUTION INTRAVENOUS PRN
Status: DISCONTINUED | OUTPATIENT
Start: 2020-12-30 | End: 2020-12-31

## 2020-12-30 RX ORDER — SODIUM CHLORIDE 9 MG/ML
10 INJECTION INTRAVENOUS DAILY
Status: COMPLETED | OUTPATIENT
Start: 2020-12-30 | End: 2020-12-30

## 2020-12-30 RX ORDER — PROPOFOL 10 MG/ML
INJECTION, EMULSION INTRAVENOUS PRN
Status: DISCONTINUED | OUTPATIENT
Start: 2020-12-30 | End: 2020-12-30 | Stop reason: SDUPTHER

## 2020-12-30 RX ORDER — NEOSTIGMINE METHYLSULFATE 1 MG/ML
INJECTION, SOLUTION INTRAVENOUS PRN
Status: DISCONTINUED | OUTPATIENT
Start: 2020-12-30 | End: 2020-12-30 | Stop reason: SDUPTHER

## 2020-12-30 RX ORDER — OXYCODONE HYDROCHLORIDE 10 MG/1
10 TABLET ORAL EVERY 4 HOURS PRN
Status: DISCONTINUED | OUTPATIENT
Start: 2020-12-30 | End: 2021-01-04 | Stop reason: HOSPADM

## 2020-12-30 RX ORDER — PROTAMINE SULFATE 10 MG/ML
INJECTION, SOLUTION INTRAVENOUS PRN
Status: DISCONTINUED | OUTPATIENT
Start: 2020-12-30 | End: 2020-12-30 | Stop reason: SDUPTHER

## 2020-12-30 RX ORDER — PROPOFOL 10 MG/ML
10 INJECTION, EMULSION INTRAVENOUS CONTINUOUS PRN
Status: DISCONTINUED | OUTPATIENT
Start: 2020-12-30 | End: 2020-12-30

## 2020-12-30 RX ORDER — ACETAMINOPHEN 650 MG/1
650 SUPPOSITORY RECTAL EVERY 4 HOURS PRN
Status: DISCONTINUED | OUTPATIENT
Start: 2020-12-30 | End: 2020-12-31

## 2020-12-30 RX ORDER — NICOTINE POLACRILEX 4 MG
15 LOZENGE BUCCAL PRN
Status: DISCONTINUED | OUTPATIENT
Start: 2020-12-30 | End: 2020-12-31

## 2020-12-30 RX ORDER — MIDAZOLAM HYDROCHLORIDE 1 MG/ML
INJECTION INTRAMUSCULAR; INTRAVENOUS PRN
Status: DISCONTINUED | OUTPATIENT
Start: 2020-12-30 | End: 2020-12-30 | Stop reason: SDUPTHER

## 2020-12-30 RX ORDER — INSULIN GLARGINE 100 [IU]/ML
0.15 INJECTION, SOLUTION SUBCUTANEOUS NIGHTLY
Status: DISCONTINUED | OUTPATIENT
Start: 2020-12-31 | End: 2021-01-04 | Stop reason: HOSPADM

## 2020-12-30 RX ORDER — VECURONIUM BROMIDE 1 MG/ML
INJECTION, POWDER, LYOPHILIZED, FOR SOLUTION INTRAVENOUS PRN
Status: DISCONTINUED | OUTPATIENT
Start: 2020-12-30 | End: 2020-12-30 | Stop reason: SDUPTHER

## 2020-12-30 RX ORDER — OXYCODONE HYDROCHLORIDE 5 MG/1
5 TABLET ORAL EVERY 4 HOURS PRN
Status: DISCONTINUED | OUTPATIENT
Start: 2020-12-30 | End: 2021-01-04 | Stop reason: HOSPADM

## 2020-12-30 RX ORDER — AMINOCAPROIC ACID 250 MG/ML
INJECTION, SOLUTION INTRAVENOUS PRN
Status: DISCONTINUED | OUTPATIENT
Start: 2020-12-30 | End: 2020-12-30 | Stop reason: SDUPTHER

## 2020-12-30 RX ORDER — ACETAMINOPHEN 325 MG/1
650 TABLET ORAL EVERY 4 HOURS PRN
Status: DISCONTINUED | OUTPATIENT
Start: 2020-12-30 | End: 2020-12-31

## 2020-12-30 RX ORDER — FENTANYL CITRATE 50 UG/ML
50 INJECTION, SOLUTION INTRAMUSCULAR; INTRAVENOUS
Status: DISCONTINUED | OUTPATIENT
Start: 2020-12-30 | End: 2020-12-31

## 2020-12-30 RX ORDER — IPRATROPIUM BROMIDE AND ALBUTEROL SULFATE 2.5; .5 MG/3ML; MG/3ML
1 SOLUTION RESPIRATORY (INHALATION)
Status: DISCONTINUED | OUTPATIENT
Start: 2020-12-30 | End: 2021-01-04 | Stop reason: HOSPADM

## 2020-12-30 RX ORDER — CHLORHEXIDINE GLUCONATE 0.12 MG/ML
15 RINSE ORAL ONCE
Status: COMPLETED | OUTPATIENT
Start: 2020-12-30 | End: 2020-12-30

## 2020-12-30 RX ORDER — NITROGLYCERIN 5 MG/ML
INJECTION, SOLUTION INTRAVENOUS PRN
Status: DISCONTINUED | OUTPATIENT
Start: 2020-12-30 | End: 2020-12-30 | Stop reason: SDUPTHER

## 2020-12-30 RX ORDER — SODIUM CHLORIDE 0.9 % (FLUSH) 0.9 %
10 SYRINGE (ML) INJECTION EVERY 12 HOURS SCHEDULED
Status: DISCONTINUED | OUTPATIENT
Start: 2020-12-30 | End: 2020-12-30 | Stop reason: HOSPADM

## 2020-12-30 RX ORDER — DOXYCYCLINE HYCLATE 100 MG/1
100 CAPSULE ORAL EVERY 12 HOURS SCHEDULED
Status: DISCONTINUED | OUTPATIENT
Start: 2020-12-30 | End: 2021-01-04 | Stop reason: HOSPADM

## 2020-12-30 RX ORDER — SODIUM CHLORIDE 9 MG/ML
30 INJECTION, SOLUTION INTRAVENOUS CONTINUOUS
Status: DISCONTINUED | OUTPATIENT
Start: 2020-12-30 | End: 2020-12-31

## 2020-12-30 RX ORDER — ATORVASTATIN CALCIUM 40 MG/1
40 TABLET, FILM COATED ORAL DAILY
Status: DISCONTINUED | OUTPATIENT
Start: 2020-12-31 | End: 2021-01-04 | Stop reason: HOSPADM

## 2020-12-30 RX ORDER — DEXTROSE MONOHYDRATE 50 MG/ML
100 INJECTION, SOLUTION INTRAVENOUS PRN
Status: DISCONTINUED | OUTPATIENT
Start: 2020-12-30 | End: 2020-12-31

## 2020-12-30 RX ORDER — MEPERIDINE HYDROCHLORIDE 50 MG/ML
25 INJECTION INTRAMUSCULAR; INTRAVENOUS; SUBCUTANEOUS
Status: ACTIVE | OUTPATIENT
Start: 2020-12-30 | End: 2020-12-30

## 2020-12-30 RX ORDER — ALBUMIN, HUMAN INJ 5% 5 %
25 SOLUTION INTRAVENOUS PRN
Status: DISCONTINUED | OUTPATIENT
Start: 2020-12-30 | End: 2020-12-31

## 2020-12-30 RX ORDER — FENTANYL CITRATE 0.05 MG/ML
INJECTION, SOLUTION INTRAMUSCULAR; INTRAVENOUS PRN
Status: DISCONTINUED | OUTPATIENT
Start: 2020-12-30 | End: 2020-12-30 | Stop reason: SDUPTHER

## 2020-12-30 RX ORDER — SODIUM CHLORIDE 0.9 % (FLUSH) 0.9 %
10 SYRINGE (ML) INJECTION EVERY 12 HOURS SCHEDULED
Status: DISCONTINUED | OUTPATIENT
Start: 2020-12-30 | End: 2020-12-31

## 2020-12-30 RX ORDER — CHLORHEXIDINE GLUCONATE 4 G/100ML
SOLUTION TOPICAL ONCE
Status: COMPLETED | OUTPATIENT
Start: 2020-12-30 | End: 2020-12-30

## 2020-12-30 RX ORDER — ASPIRIN 300 MG/1
300 SUPPOSITORY RECTAL ONCE
Status: COMPLETED | OUTPATIENT
Start: 2020-12-30 | End: 2020-12-30

## 2020-12-30 RX ORDER — ASPIRIN 81 MG/1
81 TABLET ORAL DAILY
Status: DISCONTINUED | OUTPATIENT
Start: 2020-12-31 | End: 2020-12-31

## 2020-12-30 RX ORDER — CEFAZOLIN SODIUM 1 G/3ML
INJECTION, POWDER, FOR SOLUTION INTRAMUSCULAR; INTRAVENOUS PRN
Status: DISCONTINUED | OUTPATIENT
Start: 2020-12-30 | End: 2020-12-30 | Stop reason: SDUPTHER

## 2020-12-30 RX ORDER — 0.9 % SODIUM CHLORIDE 0.9 %
250 INTRAVENOUS SOLUTION INTRAVENOUS CONTINUOUS PRN
Status: DISCONTINUED | OUTPATIENT
Start: 2020-12-30 | End: 2020-12-31

## 2020-12-30 RX ORDER — SENNA PLUS 8.6 MG/1
1 TABLET ORAL NIGHTLY
Status: DISCONTINUED | OUTPATIENT
Start: 2020-12-31 | End: 2020-12-31

## 2020-12-30 RX ORDER — TAMSULOSIN HYDROCHLORIDE 0.4 MG/1
0.4 CAPSULE ORAL DAILY
Status: DISCONTINUED | OUTPATIENT
Start: 2020-12-31 | End: 2021-01-04 | Stop reason: HOSPADM

## 2020-12-30 RX ORDER — CHLORHEXIDINE GLUCONATE 0.12 MG/ML
15 RINSE ORAL 2 TIMES DAILY
Status: DISCONTINUED | OUTPATIENT
Start: 2020-12-30 | End: 2020-12-30

## 2020-12-30 RX ORDER — POTASSIUM CHLORIDE 29.8 MG/ML
20 INJECTION INTRAVENOUS PRN
Status: DISCONTINUED | OUTPATIENT
Start: 2020-12-30 | End: 2020-12-31

## 2020-12-30 RX ORDER — SENNA AND DOCUSATE SODIUM 50; 8.6 MG/1; MG/1
1 TABLET, FILM COATED ORAL 2 TIMES DAILY
Status: DISCONTINUED | OUTPATIENT
Start: 2020-12-30 | End: 2020-12-31

## 2020-12-30 RX ADMIN — DOCUSATE SODIUM 50 MG AND SENNOSIDES 8.6 MG 1 TABLET: 8.6; 5 TABLET, FILM COATED ORAL at 20:04

## 2020-12-30 RX ADMIN — INSULIN HUMAN 5 UNITS: 100 INJECTION, SOLUTION PARENTERAL at 09:23

## 2020-12-30 RX ADMIN — INSULIN LISPRO 3 UNITS: 100 INJECTION, SOLUTION INTRAVENOUS; SUBCUTANEOUS at 23:49

## 2020-12-30 RX ADMIN — NITROGLYCERIN 25 MCG: 5 INJECTION, SOLUTION INTRAVENOUS at 07:46

## 2020-12-30 RX ADMIN — CHLORHEXIDINE GLUCONATE 15 ML: 1.2 RINSE ORAL at 05:55

## 2020-12-30 RX ADMIN — NITROGLYCERIN 50 MCG: 5 INJECTION, SOLUTION INTRAVENOUS at 08:11

## 2020-12-30 RX ADMIN — INSULIN LISPRO 3 UNITS: 100 INJECTION, SOLUTION INTRAVENOUS; SUBCUTANEOUS at 10:56

## 2020-12-30 RX ADMIN — AMINOCAPROIC ACID 5000 MG: 250 INJECTION, SOLUTION INTRAVENOUS at 07:21

## 2020-12-30 RX ADMIN — SODIUM CHLORIDE, POTASSIUM CHLORIDE, SODIUM LACTATE AND CALCIUM CHLORIDE: 600; 310; 30; 20 INJECTION, SOLUTION INTRAVENOUS at 07:05

## 2020-12-30 RX ADMIN — SODIUM BICARBONATE 50 MEQ: 84 INJECTION, SOLUTION INTRAVENOUS at 12:51

## 2020-12-30 RX ADMIN — AMINOCAPROIC ACID 1 G/HR: 250 INJECTION, SOLUTION INTRAVENOUS at 07:21

## 2020-12-30 RX ADMIN — FENTANYL CITRATE 250 MCG: 50 INJECTION, SOLUTION INTRAMUSCULAR; INTRAVENOUS at 07:42

## 2020-12-30 RX ADMIN — INSULIN LISPRO 3 UNITS: 100 INJECTION, SOLUTION INTRAVENOUS; SUBCUTANEOUS at 20:18

## 2020-12-30 RX ADMIN — IPRATROPIUM BROMIDE AND ALBUTEROL SULFATE 1 AMPULE: .5; 3 SOLUTION RESPIRATORY (INHALATION) at 20:42

## 2020-12-30 RX ADMIN — SODIUM CHLORIDE 30 ML/HR: 9 INJECTION, SOLUTION INTRAVENOUS at 10:40

## 2020-12-30 RX ADMIN — FENTANYL CITRATE 250 MCG: 50 INJECTION, SOLUTION INTRAMUSCULAR; INTRAVENOUS at 07:45

## 2020-12-30 RX ADMIN — FENTANYL CITRATE 25 MCG: 50 INJECTION, SOLUTION INTRAMUSCULAR; INTRAVENOUS at 20:05

## 2020-12-30 RX ADMIN — Medication 3 MG: at 10:35

## 2020-12-30 RX ADMIN — FENTANYL CITRATE 50 MCG: 50 INJECTION, SOLUTION INTRAMUSCULAR; INTRAVENOUS at 16:51

## 2020-12-30 RX ADMIN — FENTANYL CITRATE 25 MCG: 50 INJECTION, SOLUTION INTRAMUSCULAR; INTRAVENOUS at 14:26

## 2020-12-30 RX ADMIN — MIDAZOLAM 2 MG: 1 INJECTION INTRAMUSCULAR; INTRAVENOUS at 06:26

## 2020-12-30 RX ADMIN — MUPIROCIN: 20 OINTMENT TOPICAL at 20:04

## 2020-12-30 RX ADMIN — FENTANYL CITRATE 150 MCG: 50 INJECTION, SOLUTION INTRAMUSCULAR; INTRAVENOUS at 06:52

## 2020-12-30 RX ADMIN — ASPIRIN 300 MG: 300 SUPPOSITORY RECTAL at 10:56

## 2020-12-30 RX ADMIN — INSULIN HUMAN 2 UNITS: 100 INJECTION, SOLUTION PARENTERAL at 09:34

## 2020-12-30 RX ADMIN — OXYCODONE HYDROCHLORIDE 10 MG: 10 TABLET ORAL at 18:59

## 2020-12-30 RX ADMIN — PANTOPRAZOLE SODIUM 40 MG: 40 INJECTION, POWDER, FOR SOLUTION INTRAVENOUS at 10:56

## 2020-12-30 RX ADMIN — HYDROMORPHONE HYDROCHLORIDE 2 MG: 2 INJECTION, SOLUTION INTRAMUSCULAR; INTRAVENOUS; SUBCUTANEOUS at 08:04

## 2020-12-30 RX ADMIN — ACETAMINOPHEN 650 MG: 325 TABLET, FILM COATED ORAL at 20:04

## 2020-12-30 RX ADMIN — SODIUM CHLORIDE: 9 INJECTION, SOLUTION INTRAVENOUS at 06:08

## 2020-12-30 RX ADMIN — MUPIROCIN: 20 OINTMENT TOPICAL at 12:12

## 2020-12-30 RX ADMIN — VECURONIUM BROMIDE 1 MG: 10 INJECTION, POWDER, LYOPHILIZED, FOR SOLUTION INTRAVENOUS at 07:01

## 2020-12-30 RX ADMIN — IPRATROPIUM BROMIDE AND ALBUTEROL SULFATE 1 AMPULE: .5; 3 SOLUTION RESPIRATORY (INHALATION) at 16:06

## 2020-12-30 RX ADMIN — CEFAZOLIN 2000 MG: 1 INJECTION, POWDER, FOR SOLUTION INTRAMUSCULAR; INTRAVENOUS at 09:28

## 2020-12-30 RX ADMIN — HEPARIN SODIUM 9 UNITS: 10000 INJECTION, SOLUTION INTRAVENOUS; SUBCUTANEOUS at 06:26

## 2020-12-30 RX ADMIN — SODIUM CHLORIDE, PRESERVATIVE FREE 10 ML: 5 INJECTION INTRAVENOUS at 11:02

## 2020-12-30 RX ADMIN — MUPIROCIN: 20 OINTMENT TOPICAL at 20:05

## 2020-12-30 RX ADMIN — FENTANYL CITRATE 50 MCG: 50 INJECTION, SOLUTION INTRAMUSCULAR; INTRAVENOUS at 11:21

## 2020-12-30 RX ADMIN — PROPOFOL 60 MG: 10 INJECTION, EMULSION INTRAVENOUS at 07:47

## 2020-12-30 RX ADMIN — SODIUM CHLORIDE, PRESERVATIVE FREE 10 ML: 5 INJECTION INTRAVENOUS at 20:04

## 2020-12-30 RX ADMIN — DOXYCYCLINE HYCLATE 100 MG: 100 CAPSULE ORAL at 20:04

## 2020-12-30 RX ADMIN — Medication 2 G: at 16:05

## 2020-12-30 RX ADMIN — VECURONIUM BROMIDE 5 MG: 10 INJECTION, POWDER, LYOPHILIZED, FOR SOLUTION INTRAVENOUS at 07:32

## 2020-12-30 RX ADMIN — CHLORHEXIDINE GLUCONATE: 213 SOLUTION TOPICAL at 05:55

## 2020-12-30 RX ADMIN — FENTANYL CITRATE 100 MCG: 50 INJECTION, SOLUTION INTRAMUSCULAR; INTRAVENOUS at 07:15

## 2020-12-30 RX ADMIN — MIDAZOLAM 2 MG: 1 INJECTION INTRAMUSCULAR; INTRAVENOUS at 06:35

## 2020-12-30 RX ADMIN — VECURONIUM BROMIDE 9 MG: 10 INJECTION, POWDER, LYOPHILIZED, FOR SOLUTION INTRAVENOUS at 06:52

## 2020-12-30 RX ADMIN — HEPARIN SODIUM 50000 UNITS: 10000 INJECTION, SOLUTION INTRAVENOUS; SUBCUTANEOUS at 08:18

## 2020-12-30 RX ADMIN — NITROGLYCERIN 50 MCG: 5 INJECTION, SOLUTION INTRAVENOUS at 07:53

## 2020-12-30 RX ADMIN — Medication 0.6 MG: at 10:35

## 2020-12-30 RX ADMIN — CLEVIPIDINE 1 MG/HR: 0.5 EMULSION INTRAVENOUS at 07:54

## 2020-12-30 RX ADMIN — IPRATROPIUM BROMIDE AND ALBUTEROL SULFATE 1 AMPULE: .5; 3 SOLUTION RESPIRATORY (INHALATION) at 12:33

## 2020-12-30 RX ADMIN — FENTANYL CITRATE 50 MCG: 50 INJECTION, SOLUTION INTRAMUSCULAR; INTRAVENOUS at 12:46

## 2020-12-30 RX ADMIN — FENTANYL CITRATE 250 MCG: 50 INJECTION, SOLUTION INTRAMUSCULAR; INTRAVENOUS at 07:21

## 2020-12-30 RX ADMIN — PROTAMINE SULFATE 300 MG: 10 INJECTION, SOLUTION INTRAVENOUS at 09:23

## 2020-12-30 RX ADMIN — INSULIN LISPRO 3 UNITS: 100 INJECTION, SOLUTION INTRAVENOUS; SUBCUTANEOUS at 14:20

## 2020-12-30 RX ADMIN — PROPOFOL 10 MCG/KG/MIN: 10 INJECTION, EMULSION INTRAVENOUS at 10:40

## 2020-12-30 RX ADMIN — VECURONIUM BROMIDE 5 MG: 10 INJECTION, POWDER, LYOPHILIZED, FOR SOLUTION INTRAVENOUS at 08:45

## 2020-12-30 RX ADMIN — INSULIN HUMAN 5 UNITS: 100 INJECTION, SOLUTION PARENTERAL at 08:55

## 2020-12-30 RX ADMIN — PROPOFOL 140 MG: 10 INJECTION, EMULSION INTRAVENOUS at 06:52

## 2020-12-30 RX ADMIN — FENTANYL CITRATE 50 MCG: 50 INJECTION, SOLUTION INTRAMUSCULAR; INTRAVENOUS at 22:04

## 2020-12-30 RX ADMIN — FENTANYL CITRATE 50 MCG: 50 INJECTION, SOLUTION INTRAMUSCULAR; INTRAVENOUS at 18:02

## 2020-12-30 RX ADMIN — LIDOCAINE HYDROCHLORIDE 50 MG: 20 INJECTION, SOLUTION INFILTRATION; PERINEURAL at 07:41

## 2020-12-30 RX ADMIN — CHLORHEXIDINE GLUCONATE 0.12% ORAL RINSE 15 ML: 1.2 LIQUID ORAL at 10:56

## 2020-12-30 RX ADMIN — CALCIUM GLUCONATE 1 G: 98 INJECTION, SOLUTION INTRAVENOUS at 12:20

## 2020-12-30 RX ADMIN — CEFAZOLIN 2000 MG: 1 INJECTION, POWDER, FOR SOLUTION INTRAMUSCULAR; INTRAVENOUS at 07:04

## 2020-12-30 RX ADMIN — FENTANYL CITRATE 50 MCG: 50 INJECTION, SOLUTION INTRAMUSCULAR; INTRAVENOUS at 15:33

## 2020-12-30 RX ADMIN — LIDOCAINE HYDROCHLORIDE 50 MG: 20 INJECTION, SOLUTION INFILTRATION; PERINEURAL at 06:52

## 2020-12-30 RX ADMIN — ALBUMIN (HUMAN) 25 G: 12.5 INJECTION, SOLUTION INTRAVENOUS at 11:24

## 2020-12-30 ASSESSMENT — PULMONARY FUNCTION TESTS
PIF_VALUE: 29
PIF_VALUE: 27
PIF_VALUE: 27
PIF_VALUE: 23
PIF_VALUE: 2
PIF_VALUE: 1
PIF_VALUE: 24
PIF_VALUE: 0
PIF_VALUE: 1
PIF_VALUE: 0
PIF_VALUE: 28
PIF_VALUE: 26
PIF_VALUE: 23
PIF_VALUE: 28
PIF_VALUE: 0
PIF_VALUE: 1
PIF_VALUE: 30
PIF_VALUE: 30
PIF_VALUE: 24
PIF_VALUE: 26
PIF_VALUE: 0
PIF_VALUE: 28
PIF_VALUE: 27
PIF_VALUE: 1
PIF_VALUE: 30
PIF_VALUE: 29
PIF_VALUE: 29
PIF_VALUE: 28
PIF_VALUE: 28
PIF_VALUE: 27
PIF_VALUE: 1
PIF_VALUE: 1
PIF_VALUE: 27
PIF_VALUE: 27
PIF_VALUE: 29
PIF_VALUE: 28
PIF_VALUE: 29
PIF_VALUE: 1
PIF_VALUE: 0
PIF_VALUE: 0
PIF_VALUE: 23
PIF_VALUE: 1
PIF_VALUE: 1
PIF_VALUE: 28
PIF_VALUE: 27
PIF_VALUE: 5
PIF_VALUE: 29
PIF_VALUE: 29
PIF_VALUE: 28
PIF_VALUE: 28
PIF_VALUE: 30
PIF_VALUE: 24
PIF_VALUE: 28
PIF_VALUE: 0
PIF_VALUE: 27
PIF_VALUE: 0
PIF_VALUE: 30
PIF_VALUE: 1
PIF_VALUE: 31
PIF_VALUE: 1
PIF_VALUE: 28
PIF_VALUE: 1
PIF_VALUE: 30
PIF_VALUE: 28
PIF_VALUE: 22
PIF_VALUE: 1
PIF_VALUE: 28
PIF_VALUE: 27
PIF_VALUE: 0
PIF_VALUE: 27
PIF_VALUE: 0
PIF_VALUE: 23
PIF_VALUE: 27
PIF_VALUE: 28
PIF_VALUE: 28
PIF_VALUE: 0
PIF_VALUE: 1
PIF_VALUE: 25
PIF_VALUE: 26
PIF_VALUE: 30
PIF_VALUE: 24
PIF_VALUE: 29
PIF_VALUE: 30
PIF_VALUE: 24
PIF_VALUE: 29
PIF_VALUE: 28
PIF_VALUE: 30
PIF_VALUE: 28
PIF_VALUE: 22
PIF_VALUE: 25
PIF_VALUE: 1
PIF_VALUE: 24
PIF_VALUE: 27
PIF_VALUE: 28
PIF_VALUE: 1
PIF_VALUE: 29
PIF_VALUE: 28
PIF_VALUE: 29
PIF_VALUE: 1
PIF_VALUE: 29
PIF_VALUE: 2
PIF_VALUE: 27
PIF_VALUE: 27
PIF_VALUE: 28
PIF_VALUE: 29
PIF_VALUE: 0
PIF_VALUE: 1
PIF_VALUE: 1
PIF_VALUE: 29
PIF_VALUE: 28
PIF_VALUE: 29
PIF_VALUE: 0
PIF_VALUE: 28
PIF_VALUE: 29
PIF_VALUE: 27
PIF_VALUE: 25
PIF_VALUE: 29
PIF_VALUE: 1
PIF_VALUE: 1
PIF_VALUE: 25
PIF_VALUE: 23
PIF_VALUE: 28
PIF_VALUE: 3
PIF_VALUE: 22
PIF_VALUE: 28
PIF_VALUE: 1
PIF_VALUE: 1
PIF_VALUE: 30
PIF_VALUE: 1
PIF_VALUE: 31
PIF_VALUE: 27
PIF_VALUE: 27
PIF_VALUE: 30
PIF_VALUE: 27
PIF_VALUE: 29
PIF_VALUE: 23
PIF_VALUE: 30
PIF_VALUE: 2
PIF_VALUE: 28
PIF_VALUE: 29
PIF_VALUE: 30
PIF_VALUE: 31
PIF_VALUE: 32
PIF_VALUE: 29
PIF_VALUE: 1
PIF_VALUE: 24
PIF_VALUE: 31
PIF_VALUE: 30
PIF_VALUE: 20
PIF_VALUE: 26
PIF_VALUE: 33
PIF_VALUE: 28
PIF_VALUE: 2
PIF_VALUE: 28
PIF_VALUE: 1
PIF_VALUE: 28
PIF_VALUE: 2
PIF_VALUE: 0
PIF_VALUE: 1
PIF_VALUE: 29
PIF_VALUE: 30
PIF_VALUE: 29
PIF_VALUE: 1
PIF_VALUE: 0
PIF_VALUE: 31
PIF_VALUE: 27
PIF_VALUE: 25
PIF_VALUE: 28
PIF_VALUE: 28
PIF_VALUE: 26
PIF_VALUE: 0
PIF_VALUE: 30
PIF_VALUE: 30
PIF_VALUE: 31
PIF_VALUE: 28
PIF_VALUE: 31
PIF_VALUE: 28
PIF_VALUE: 24
PIF_VALUE: 1
PIF_VALUE: 29
PIF_VALUE: 30
PIF_VALUE: 30
PIF_VALUE: 28
PIF_VALUE: 27
PIF_VALUE: 27
PIF_VALUE: 1
PIF_VALUE: 21
PIF_VALUE: 26
PIF_VALUE: 30
PIF_VALUE: 3
PIF_VALUE: 0
PIF_VALUE: 27
PIF_VALUE: 27
PIF_VALUE: 1
PIF_VALUE: 29
PIF_VALUE: 23
PIF_VALUE: 22
PIF_VALUE: 31
PIF_VALUE: 25
PIF_VALUE: 29
PIF_VALUE: 27
PIF_VALUE: 32
PIF_VALUE: 28
PIF_VALUE: 0
PIF_VALUE: 1
PIF_VALUE: 0
PIF_VALUE: 28
PIF_VALUE: 28
PIF_VALUE: 1
PIF_VALUE: 1
PIF_VALUE: 26
PIF_VALUE: 22
PIF_VALUE: 1
PIF_VALUE: 23
PIF_VALUE: 22
PIF_VALUE: 1
PIF_VALUE: 27
PIF_VALUE: 28
PIF_VALUE: 29
PIF_VALUE: 32
PIF_VALUE: 9
PIF_VALUE: 29
PIF_VALUE: 30
PIF_VALUE: 0
PIF_VALUE: 21
PIF_VALUE: 0
PIF_VALUE: 29
PIF_VALUE: 27
PIF_VALUE: 28
PIF_VALUE: 30
PIF_VALUE: 29
PIF_VALUE: 26
PIF_VALUE: 28
PIF_VALUE: 1
PIF_VALUE: 1
PIF_VALUE: 30

## 2020-12-30 ASSESSMENT — PAIN - FUNCTIONAL ASSESSMENT
PAIN_FUNCTIONAL_ASSESSMENT: PREVENTS OR INTERFERES WITH MANY ACTIVE NOT PASSIVE ACTIVITIES
PAIN_FUNCTIONAL_ASSESSMENT: PREVENTS OR INTERFERES WITH ALL ACTIVE AND SOME PASSIVE ACTIVITIES
PAIN_FUNCTIONAL_ASSESSMENT: 0-10
PAIN_FUNCTIONAL_ASSESSMENT: PREVENTS OR INTERFERES WITH ALL ACTIVE AND SOME PASSIVE ACTIVITIES

## 2020-12-30 ASSESSMENT — PAIN SCALES - GENERAL
PAINLEVEL_OUTOF10: 8
PAINLEVEL_OUTOF10: 0
PAINLEVEL_OUTOF10: 10
PAINLEVEL_OUTOF10: 10
PAINLEVEL_OUTOF10: 9
PAINLEVEL_OUTOF10: 9
PAINLEVEL_OUTOF10: 8
PAINLEVEL_OUTOF10: 9

## 2020-12-30 ASSESSMENT — PAIN DESCRIPTION - LOCATION
LOCATION: CHEST

## 2020-12-30 ASSESSMENT — PAIN DESCRIPTION - FREQUENCY
FREQUENCY: CONTINUOUS
FREQUENCY: CONTINUOUS

## 2020-12-30 ASSESSMENT — PAIN DESCRIPTION - PAIN TYPE
TYPE: SURGICAL PAIN
TYPE: ACUTE PAIN;SURGICAL PAIN

## 2020-12-30 ASSESSMENT — PAIN DESCRIPTION - DESCRIPTORS
DESCRIPTORS: ACHING;DISCOMFORT;SORE
DESCRIPTORS: ACHING;DISCOMFORT;SORE

## 2020-12-30 ASSESSMENT — ENCOUNTER SYMPTOMS: DYSPNEA ACTIVITY LEVEL: AFTER AMBULATING 2 FLIGHTS OF STAIRS

## 2020-12-30 NOTE — ANESTHESIA PROCEDURE NOTES
Arterial Line:    An arterial line was placed using ultrasound guidance and surface landmarks, in the OR for the following indication(s): continuous blood pressure monitoring and blood sampling needed. A 20 gauge (size), 5 cm (length), Arrow (type) catheter was placed, Seldinger technique not used, into the right brachial artery, secured by tape and Tegaderm. Anesthesia type: Local  Local infiltration: Injection    Events:  patient tolerated procedure well with no complications.   Anesthesiologist: Freddie Moon MD  Resident/CRNA: JOHN Clark CRNA  Performed: Resident/CRNA   Preanesthetic Checklist  Completed: patient identified, IV checked, site marked, risks and benefits discussed, surgical consent, monitors and equipment checked, pre-op evaluation, timeout performed, anesthesia consent given, oxygen available and patient being monitored

## 2020-12-30 NOTE — CONSULTS
Inpatient Cardiology Progress note     PATIENT IS BEING FOLLOWED FOR: post operative evaluation s/p CABG LIMA-LAD and SVG-RCA     Servando Perdomo is a 63 y.o. male who follows with Dr Jena Omalley     PMH: CAD, h/o  inferior MI (JACOB RCA 10/10); JACOB distal RCA (1/13); PTCA to RCA secondary to in-stent restenosis (10/22/2020) with physiologically significant mid LAD stenosis and diffuse RCA plaque which is occlusive at the mid segment by IVUS, hypertension, hyperlipidemia, OCTAVIO, obesity. Brief history: reported chest pain with exertion. Elective heart cath 10/22/2020 with outpatient referral for CABG given diffuse disease in the RCA and recurrence of in-stent restenosis due to undersized stents and due to severe physiologically significant long calcified mid LAD stenosis. SUBJECTIVE:  INTUBATED AND SEDATED   OBJECTIVE: No apparent distress, resting in bed intubated and sedated. ROS: unable to obtain as the patient is intubated and sedated      PHYSICAL EXAM:   /69   Pulse 75   Temp 96.2 °F (35.7 °C)   Resp 18   Ht 5' 8\" (1.727 m)   Wt 255 lb (115.7 kg)   SpO2 94%   BMI 38.77 kg/m²      CONST:  Well developed, obese  male who appears his stated age. Awake, alert, cooperative, no apparent distress  HEENT:   Head- Normocephalic, atraumatic   Eyes- Conjunctivae pink, anicteric  Throat- Oral mucosa pink and moist  Neck-  No stridor, trachea midline, no jugular venous distention. Left swan   CHEST: Chest symmetrical, sternotomy dressing clean dry and intact. Pacing wires in place. CT with minimal drainage   RESPIRATORY: Lung sounds clear throughout fields bilaterally. CARDIOVASCULAR:     No carotid bruit noted bilaterally   Heart Ausculation- Regular rate and rhythm, no murmur. PV: No lower extremity edema. Right LE EVH with ace wrap in place   ABDOMEN: Soft, non-tender to light palpation. Bowel sounds present. MS: Good muscle strength and tone. No atrophy or abnormal movements.    : mayra with light yellow urine   SKIN: Warm and dry no statis dermatitis or ulcers   NEURO / PSYCH: Oriented to person, place and time. Speech clear and appropriate. Follows all commands.  Pleasant affect       Intake/Output Summary (Last 24 hours) at 12/30/2020 1051  Last data filed at 12/30/2020 1040  Gross per 24 hour   Intake 2800 ml   Output 1505 ml   Net 1295 ml       Weight:   Wt Readings from Last 3 Encounters:   12/30/20 255 lb (115.7 kg)   12/23/20 255 lb (115.7 kg)   11/10/20 262 lb (118.8 kg)     Current Inpatient Medications:   [START ON 12/31/2020] atorvastatin  40 mg Oral Daily    citalopram  20 mg Oral Daily    vitamin D  5,000 Units Oral Daily    sodium chloride flush  10 mL Intravenous 2 times per day    [START ON 12/31/2020] aspirin  81 mg Oral Daily    aspirin  300 mg Rectal Once    chlorhexidine  15 mL Mouth/Throat BID    [START ON 12/31/2020] magnesium oxide  400 mg Oral Daily    mupirocin   Nasal BID    sennosides-docusate sodium  1 tablet Oral BID    [START ON 12/31/2020] pantoprazole  40 mg Oral Daily    pantoprazole  40 mg Intravenous Daily    And    sodium chloride (PF)  10 mL Intravenous Daily    ceFAZolin (ANCEF) IVPB  2 g Intravenous Q8H    ipratropium-albuterol  1 ampule Inhalation Q4H WA    [START ON 12/31/2020] insulin glargine  0.15 Units/kg Subcutaneous Nightly    [START ON 12/31/2020] senna  1 tablet Oral Nightly    [START ON 12/31/2020] insulin lispro  0-18 Units Subcutaneous Q4H    [START ON 12/31/2020] tamsulosin  0.4 mg Oral Daily    insulin lispro  0-18 Units Subcutaneous Q4H    propofol           IV Infusions (if any):   sodium chloride      propofol      sodium chloride      norepinephrine      clevidipine      insulin      dextrose         DIAGNOSTIC/ LABORATORY DATA:  Labs:     BMP:   Recent Labs     12/30/20  0852 12/30/20  0923   K 4.5 5.1     HgA1c:   Lab Results   Component Value Date    LABA1C 6.1 (H) 12/23/2020     FASTING LIPID PANEL:  Lab Results   Component Value Date    CHOL 172 10/08/2010    HDL 26.0 10/08/2010    LDLCALC 93 10/08/2010    TRIG 266 10/08/2010       Telemetry: SR HR 60s     [facility] (10/22/2020, Dr. Anthony Little)  HEMODYNAMICS:  Aortic pressure 110/84 mmHg. LVEDP 28 mmHg. CORONARY ANATOMY:  LEFT MAIN:  It is a long and large artery with no angiographic stenosis noted. LAD:  It is a large artery which is calcified in its proximal to mid segment.  It gives rise to three small diagonal branches and large septal . Nohemi Going was a long 60 to 70% mid stenosis.  There was 40 to 50% discrete stenosis in proximal second diagonal and 70% discrete stenosis in the third diagonal branch which is approximately 1.5-1.6 mm in diameter. LEFT CIRCUMFLEX:  It is a large artery giving rise to a large bifurcating first obtuse marginal branch and a second smaller obtuse marginal branch.  There was 20-30% proximal circumflex stenosis. RCA:  It is medium in size and dominant giving rise to a conus branch, a fair size RV marginal branch, a PDA and a PLV branch.  The RCA has an inferior takeoff.  It is calcified in its proximal to mid segment. There was a long 50 to 60% mid stenosis.  There was a stent inside the stent in the distal RCA prior to the bifurcation with 80 to 90% focal in-stent restenosis involving the proximal segment of the stents. LEFT VENTRICULOGRAM:  Revealed basal inferior akinesis with an ejection fraction of 55%.  There was trivial mitral regurgitation. IFR OF LAD AND PCI AND IVUS OF THE RCA:  The patient received 4000 units of intravenous heparin.  Then a 6-Iranian EBU 3.5 guiding catheter was used to engage the left main coronary artery.  Then, IFR [place]  catheter was zeroed and normalized and was advanced to the distal LAD without difficulty.  IFR obtained was 0.90, then 0.90, then 0.89 x3.  The patient was asked about his preference of CABG versus PCI of RCA  with staged PCI of the LAD.  The patient was relucting to undergo bypass at this time.  So decision was made to proceed with PCI of the RCA and staged PCI of the LAD in the next few weeks.  Then a 6-Filipino ART 3.5  guiding catheter failed to engage the right coronary artery.  It was exchanged over a guidewire to a 6-Filipino JR-4 guiding catheter with side holes.  Then a BMW wire was advanced to the PDA without difficulty. Then a 2.5 x 15 [place] Monorail balloon was inflated at 12 atmospheres at the site of the stenosis inside the stent.  Then a [place] 2.5 x 10  cutting balloon was inflated twice inside the stent at 12 atmospheres with 50% residual in-stent restenosis and SHERRIE-3 flow.  To mention that the flow was SHERRIE-3 flow prior to the intervention.  Then the [facility] IVUS catheter was advanced to the proximal PDA and manual pullback was done. Noa Gauthier was a stent placed inside the stent, and both stents were undersized with minimal in-stent restenosis post PTCA. Noa Gauthier was no evidence of edge dissection. There was diffuse plaque in the RCA with calcifications. Noa Gauthier was a severe occlusive plaque at the mid RCA  with positive remodelling. IMPRESSION:  1.  Physiologically significant long mid LAD stenosis. 2.  70% discrete proximal stenosis in the small third diagonal branch. 3.  Severe mid RCA stenosis by IVUS with focal severe in-stent restenosis in distal RCA due to undersized stents, status post PTCA with 0% residual stenosis. 4.  Elevated LVEDP at 28 mmHg. 5.  Ejection fraction of 55%. Assessment:  1. Coronary artery disease with h/o inferior MI (JACOB RCA 10/10); JACOB distal RCA (1/13); PTCA to RCA secondary to in-stent restenosis (10/22/2020). S/p CABG x2 (LIMA-LAD and SVG - RCA) 12/30/2020   2. Acute postoperative respiratory insufficiency,  intubated   3. Hypertension  4. Hyperlipidemia   5. Type II diabetes mellitus   6. OCTAVIO  7. Obesity   8. Right groin erythema, ID consulted     Plan:  1. Wean vent as tolerated, per ICU team   2. Monitor HR and BP   3.  Resume cardiac

## 2020-12-30 NOTE — BRIEF OP NOTE
Brief Postoperative Note    Renato Rowland  YOB: 1955  [id number]    Pre-operative Diagnosis: H/O PCI, CAD    Post-operative Diagnosis: Same    Operation: Sternotomy/CABG x 2 (LIMA-LAD, SVG-RCA)/SHOAIB exclusion with 40 mm AtriClip/Open RLE GSV harvest/Rigid internal fixation of the sternum with KLS plates x 2    Anesthesia: General    Surgeon: Salvatore Hua    Assistants: Raymundo/Barron/Ben    Estimated Blood Loss: 2381    Complications: None    Specimens:   * No specimens in log *    Implants:  Implant Name Type Inv. Item Serial No.  Lot No. LRB No. Used Action   DEVICE OCCL CLP L40MM PLUNG GRP FLX SHFT FOR GILLINOV  DEVICE OCCL CLP L40MM PLUNG GRP FLX SHFT FOR GILLINOV  [employer]WD [id number] N/A 1 Implanted   PLATE BONE 4.5AP THICKNESS STRNL LCK 6 H CP TI  PLATE BONE 5.1UG THICKNESS STRNL LCK 6 H CP TI  KLS LOIDA LP-WD  N/A 1 Implanted   PLATE LCK STRNL 8-H LAD T 1.8MM  PLATE LCK STRNL 8-H LAD T 1.8MM  KLS LOIDA LP-WD  N/A 1 Implanted   SCREW BNE L15MM DIA2. 3MM THOR STRNL TI ALFREDO DRL FREE LEV 1  SCREW BNE L15MM DIA2. 3MM THOR STRNL TI ALFREDO DRL FREE LEV 1  KLS LOIDA LP-WD  N/A 11 Implanted   SCREW BNE L17MM DIA2. 3MM THOR STRNL TI ALFREDO DRL FREE LEV 1  SCREW BNE L17MM DIA2. 3MM THOR STRNL TI ALFREDO DRL FREE LEV 1  KLS LOIDA LP-WD  N/A 3 Implanted         Drains:   Chest Tube 1 Posterior Mediastinal 32 German (Active)   Dressing Status Clean;Dry; Intact 12/30/20 0748   Dressing Type Dry dressing 12/30/20 0748   Site Assessment Clean;Dry; Intact 12/30/20 0748       Chest Tube 2 Anterior Mediastinal 32 German (Active)   Dressing Status Clean;Dry; Intact 12/30/20 0748   Dressing Type Dry dressing 12/30/20 0748   Site Assessment Clean;Dry; Intact 12/30/20 0748       Chest Tube 3 Left Pleural 28 German (Active)   Dressing Status Clean;Dry; Intact 12/30/20 0749   Dressing Type Dry dressing 12/30/20 0749   Site Assessment Clean;Dry; Intact 12/30/20 0749       Chest Tube 4 Pleural 19 German (Active)       Urethral Catheter Temperature probe;Non-latex 16 fr (Active)       Findings: see dictation    Electronically signed by Jossie Castellano MD on 12/30/2020 at 10:02 AM

## 2020-12-30 NOTE — ANESTHESIA PROCEDURE NOTES
Procedure Performed: YULI     Start Time:        End Time:      Preanesthesia Checklist:  Patient identified, IV assessed, risks and benefits discussed, monitors and equipment assessed, procedure being performed at surgeon's request and anesthesia consent obtained. General Procedure Information  Diagnostic Indications for Echo:  assessment of ascending aorta, hemodynamic monitoring and assessment of valve function  Physician Requesting Echo: Jose Lewis MD  Location performed:  OR  Intubated  Heart visualized  Probe Type:  3D  Modalities:  Color flow mapping, 2D only, continuous wave Doppler and pulse wave Doppler    Echocardiographic and Doppler Measurements    Ventricles    Right Ventricle:  Cavity size normal.  Hypertrophy not present. Thrombus not present. Global function normal.    Left Ventricle:  Cavity size normal.  Hypertrophy present. Thrombus not present. Global Function normal.  Ejection Fraction 57%. Ventricular Regional Function:  1- Basal Anteroseptal:  normal  2- Basal Anterior:  normal  3- Basal Anterolateral:  normal  4- Basal Inferolateral:  normal  5- Basal Inferior:  normal  6- Basal Inferoseptal:  normal  7- Mid Anteroseptal:  normal  8- Mid Anterior:  normal  9- Mid Anterolateral:  normal  10- Mid Inferolateral:  normal  11- Mid Inferior:  normal  12- Mid Inferoseptal:  normal  13- Apical Anterior:  normal  14- Apical Lateral:  normal  15- Apical Inferior:  normal  16- Apical Septal:  normal  17- [place]:  normal      Valves    Aortic Valve: Annulus normal.  Stenosis not present. Regurgitation mild. Leaflets normal.  Leaflet motions normal.      Mitral Valve: Annulus normal.  Stenosis not present. Regurgitation mild. Leaflets normal.  Leaflet motions normal.      Tricuspid Valve: Annulus normal.  Stenosis not present. Regurgitation moderate. Leaflets normal.  Leaflet motions normal.    Pulmonic Valve: Annulus normal.  Stenosis not present. Regurgitation none. Aorta    Ascending Aorta:  Size normal.    Aortic Arch:  Size normal.    Descending Aorta:  Size normal.          Atria    Right Atrium:  Size normal.    Left Atrium:  Size normal.  Left atrial appendage normal.      Septa    Atrial Septum:  Intra-atrial septal morphology normal.      Ventricular Septum:  Intra-ventricular septum morphology normal.      Diastolic Function Measurements:  Diastolic Dysfunction Grade= indeterminate  E= ms  A= ms  E/A Ratio=   DT= ms  S/D=  IVRT=    Other Findings  Pericardium:  normal      Anesthesia Information  Performed Personally  Anesthesiologist:  Abe Tai MD      Echocardiogram Comments:       S/P CABG 2 vessels, preserved LV and RV function, no LV air detected, no dissection post decannulation. Report given to Dr. Cherri Mitchell intraoperatively.

## 2020-12-30 NOTE — ANESTHESIA PRE PROCEDURE
Department of Anesthesiology  Preprocedure Note       Name:  Pratima Denise   Age:  63 y.o.  :  1955                                          MRN:  [id number]         Date:  2020      Surgeon: Michelet Eubanks):  Higinio Neumann MD    Procedure: Procedure(s):  CORONARY ARTERY BYPASS, YULI, POSS. LEFT RADIAL    Medications prior to admission:   Prior to Admission medications    Medication Sig Start Date End Date Taking? Authorizing Provider   isosorbide mononitrate (IMDUR) 60 MG extended release tablet Take 1 tablet by mouth daily 10/25/20  Yes Chantal Mclain MD   citalopram (CELEXA) 20 MG tablet Take 20 mg by mouth daily. Yes Historical Provider, MD   atorvastatin (LIPITOR) 40 MG tablet Take 40 mg by mouth daily. Yes Historical Provider, MD   enalapril (VASOTEC) 10 MG tablet Take 10 mg by mouth daily. Yes Historical Provider, MD   metoprolol (LOPRESSOR) 25 MG tablet Take 25 mg by mouth 2 times daily. Yes Historical Provider, MD   ezetimibe (ZETIA) 10 MG tablet Take 10 mg by mouth nightly    Historical Provider, MD   ticagrelor (BRILINTA) 90 MG TABS tablet Take 1 tablet by mouth 2 times daily 10/24/20 11/23/20  Chantal Mclain MD   gabapentin (NEURONTIN) 600 MG tablet Take 600 mg by mouth 2 times daily. Historical Provider, MD   nitroGLYCERIN (NITROSTAT) 0.4 MG SL tablet  17   Historical Provider, MD   aspirin 81 MG tablet Take 81 mg by mouth daily. Historical Provider, MD   Cholecalciferol (VITAMIN D-3) 5000 UNITS TABS Take 5,000 Units by mouth daily.     Historical Provider, MD       Current medications:    Current Facility-Administered Medications   Medication Dose Route Frequency Provider Last Rate Last Admin    0.9 % sodium chloride infusion   Intravenous Continuous Higinio Neumann  mL/hr at 20 0608 New Bag at 20 0608    ceFAZolin (ANCEF) 2 g in sterile water 20 mL IV syringe  2 g Intravenous On Call to Diane Joshua MD  sodium chloride flush 0.9 % injection 10 mL  10 mL Intravenous 2 times per day Austin Hurst MD        sodium chloride flush 0.9 % injection 10 mL  10 mL Intravenous PRN Austin Hurst MD           Allergies:  No Known Allergies    Problem List:    Patient Active Problem List   Diagnosis Code    Hypertension I10    Hyperlipidemia E78.5    Chest pain R07.9    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)        Past Surgical History:        Procedure Laterality Date    CARDIAC CATHETERIZATION  10/22/2020    Dr. Calin Tang- EF 50-55%, PTA Distal RCA    COLONOSCOPY      CORONARY ANGIOPLASTY      CORONARY ANGIOPLASTY Right 06/06/2017    Dr. Landon Heading [facility] W[facility] Alma Simmons  1-    2.75 x 18mm Xience Distal RCA Dr. Reza Pickens Right 04/2016    POLYPECTOMY      TONSILLECTOMY         Social History:    Social History     Tobacco Use    Smoking status: Never Smoker    Smokeless tobacco: Former User     Types: Snuff   Substance Use Topics    Alcohol use: Yes     Comment: occassionally                                Counseling given: Not Answered      Vital Signs (Current):   Vitals:    12/30/20 0527   BP: (!) 151/74   Pulse: 57   Resp: 18   Temp: 36.8 °C (98.2 °F)   TempSrc: Temporal   SpO2: 96%   Weight: 255 lb (115.7 kg)   Height: 5' 8\" (1.727 m)                                              BP Readings from Last 3 Encounters:   12/30/20 (!) 151/74   12/23/20 134/63   11/10/20 (!) 152/63 NPO Status: Time of last liquid consumption: 2000                        Time of last solid consumption: 1800                        Date of last liquid consumption: 12/29/20                        Date of last solid food consumption: 12/29/20    BMI:   Wt Readings from Last 3 Encounters:   12/30/20 255 lb (115.7 kg)   12/23/20 255 lb (115.7 kg)   11/10/20 262 lb (118.8 kg)     Body mass index is 38.77 kg/m². CBC:   Lab Results   Component Value Date    WBC 4.5 12/23/2020    RBC 4.57 12/23/2020    HGB 13.2 12/23/2020    HCT 38.7 12/23/2020    MCV 84.7 12/23/2020    RDW 13.9 12/23/2020     12/23/2020       CMP:   Lab Results   Component Value Date     12/23/2020    K 4.2 12/23/2020     12/23/2020    CO2 23 12/23/2020    BUN 13 12/23/2020    CREATININE 0.8 12/23/2020    GFRAA >60 12/23/2020    LABGLOM >60 12/23/2020    GLUCOSE 116 12/23/2020    GLUCOSE 91 03/17/2012    PROT 6.3 12/23/2020    CALCIUM 9.1 12/23/2020    BILITOT 0.6 12/23/2020    ALKPHOS 81 12/23/2020    AST 18 12/23/2020    ALT 22 12/23/2020       POC Tests: No results for input(s): POCGLU, POCNA, POCK, POCCL, POCBUN, POCHEMO, POCHCT in the last 72 hours.     Coags:   Lab Results   Component Value Date    PROTIME 11.6 12/23/2020    PROTIME 11.0 03/16/2012    INR 1.0 12/23/2020    APTT 31.9 12/23/2020       HCG (If Applicable): No results found for: PREGTESTUR, PREGSERUM, HCG, HCGQUANT     ABGs: No results found for: PHART, PO2ART, ICA8EZM, TCZ6YOJ, BEART, O3DMVGDT     Type & Screen (If Applicable):  No results found for: LABABO, LABRH    Drug/Infectious Status (If Applicable):  No results found for: HIV, HEPCAB    COVID-19 Screening (If Applicable):   Lab Results   Component Value Date    COVID19 Not Detected 12/23/2020         Anesthesia Evaluation  Patient summary reviewed no history of anesthetic complications:   Airway: Mallampati: IV  TM distance: >3 FB   Neck ROM: full  Mouth opening: > = 3 FB Dental: normal exam Pulmonary:   (+) sleep apnea: on CPAP,                             Cardiovascular:    (+) hypertension:, past MI:, CAD:, CABG/stent (multiple stents):, JOSE: after ambulating 2 flights of stairs, hyperlipidemia      ECG reviewed      Echocardiogram reviewed  Stress test reviewed       Beta Blocker:  Dose within 24 Hrs         Neuro/Psych:                ROS comment: Paresthesias right foot GI/Hepatic/Renal:   (+) morbid obesity         ROS comment: Colon polyps. Endo/Other:    (+) Diabetesno interval change, , : arthritis:., .                 Abdominal:           Vascular: negative vascular ROS. Anesthesia Plan      general     ASA 4       Induction: intravenous. arterial line, BIS, central line and YULI    Anesthetic plan and risks discussed with patient. Use of blood products discussed with patient whom consented to blood products.                    JOHN Jacobsen - CRNA   12/30/2020

## 2020-12-30 NOTE — CONSULTS
[facility] CONSULT NOTE    Reason for Consult: Right inguinal skin infection    Requested by: Hebert Dancer, APRN - CNP    Chief complaint: Elective CABG    History Obtained From: Patient and EMR    HISTORY Neg[place]              The patient is a 63 y.o. male with history of CAD, hypertension, hyperlipidemia, admitted on 12/30 for elective CABG for significant LAD disease. Postop, he was noted to have erythema on the right groin prompting subsequent ID consult. He reports having tried popping a pimple on his right groin 2 days prior to admission. He reports no fever and chills, no drainage from the right groin skin lesion. On admission, he was afebrile and hemodynamically stable with leukocytosis of 15,000. Cefazolin was started on admission for perioperative prophylaxis.     Past Medical History  Past Medical History:   Diagnosis Date    CAD (coronary artery disease)     Hyperlipidemia     Hypertension     Myocardial infarct ([facility] [address]        Current Facility-Administered Medications   Medication Dose Route Frequency Provider Last Rate Last Admin    [START ON 12/31/2020] atorvastatin (LIPITOR) tablet 40 mg  40 mg Oral Daily Gifty Simpson MD        citalopram (CELEXA) tablet 20 mg  20 mg Oral Daily Gifty Simpson MD        vitamin D (CHOLECALCIFEROL) tablet 5,000 Units  5,000 Units Oral Daily Gifty Simpson MD        0.9 % sodium chloride infusion  30 mL/hr Intravenous Continuous Gifty Simpson MD 30 mL/hr at 12/30/20 1040 30 mL/hr at 12/30/20 1040    sodium chloride flush 0.9 % injection 10 mL  10 mL Intravenous 2 times per day Gifty Simpson MD   10 mL at 12/30/20 1102    sodium chloride flush 0.9 % injection 10 mL  10 mL Intravenous PRN Gifty Simpson MD        ondansetron [facility]) injection 4 mg  4 mg Intravenous Q8H PRN Gifty Simpson MD        [START ON 12/31/2020] aspirin EC tablet 81 mg  81 mg Oral Daily Gifty Simpson MD        acetaminophen (TYLENOL) tablet 650 mg  650 mg Oral Q4H PRN Vergia Pies Salvatore Hua MD        acetaminophen (TYLENOL) suppository 650 mg  650 mg Rectal Q4H PRN Indra Mejia MD        oxyCODONE (ROXICODONE) immediate release tablet 5 mg  5 mg Oral Q4H PRN Indra Mejia MD        Or    oxyCODONE HCl (OXY-IR) immediate release tablet 10 mg  10 mg Oral Q4H PRN Indra Mejia MD        fentaNYL (SUBLIMAZE) injection 25 mcg  25 mcg Intravenous Q1H PRN Indra Mejia MD   25 mcg at 12/30/20 1426    Or    fentaNYL (SUBLIMAZE) injection 50 mcg  50 mcg Intravenous Q1H PRN Indra Mejia MD   50 mcg at 12/30/20 1533    meperidine (DEMEROL) injection 25 mg  25 mg Intravenous Once PRN Indra Mejia MD        [START ON 12/31/2020] magnesium oxide (MAG-OX) tablet 400 mg  400 mg Oral Daily Indra Mejia MD        mupirocin [facility]) 2 % ointment   Nasal BID Indra Mejia MD   Given at 12/30/20 1212    sennosides-docusate sodium (SENOKOT-S) 8.6-50 MG tablet 1 tablet  1 tablet Oral BID Indra Mejia MD        magnesium hydroxide (MILK OF MAGNESIA) 400 MG/5ML suspension 30 mL  30 mL Oral Daily PRN Indra Mejia MD        [START ON 12/31/2020] pantoprazole (PROTONIX) tablet 40 mg  40 mg Oral Daily Indra Mejia MD        ceFAZolin (ANCEF) 2 g in sterile water 20 mL IV syringe  2 g Intravenous Q8H Indra Mejia MD   2 g at 12/30/20 1605    potassium chloride 20 mEq/50 mL IVPB (Central Line)  20 mEq Intravenous PRN Indra Mejia MD        magnesium sulfate 2 g in 50 mL IVPB premix  2 g Intravenous PRN Indra Mejia MD        ipratropium-albuterol (DUONEB) nebulizer solution 1 ampule  1 ampule Inhalation Q4H WA Indra Mejia MD   1 ampule at 12/30/20 1606    albumin human 5 % IV solution 25 g  25 g Intravenous PRN Indra Mejia MD        0.9 % sodium chloride bolus  250 mL Intravenous Continuous PRN Indra Mejia MD        norepinephrine (LEVOPHED) 16 mg in dextrose 5% 250 mL infusion  2 mcg/min Intravenous Continuous PRN Indra Mejia MD        clevidipine (CLEVIPREX) infusion  2 mg/hr Intravenous Continuous PRPAYTON Alicea MD        insulin regular (HUMULIN R;NOVOLIN R) 100 Units in sodium chloride 0.9 % 100 mL infusion  1 Units/hr Intravenous Continuous MAXWELL Alicea MD        [START ON 12/31/2020] insulin glargine (LANTUS) injection vial 17 Units  0.15 Units/kg Subcutaneous Nightly Jazmine Alicea MD        glucose (GLUTOSE) 40 % oral gel 15 g  15 g Oral PRPAYTON Alicea MD        dextrose 50 % IV solution  12.5 g Intravenous PRPAYTON Alicea MD        glucagon (rDNA) injection 1 mg  1 mg Intramuscular PRPAYTON Alicea MD        dextrose 5 % solution  100 mL/hr Intravenous MAXWELL Alicea MD        [START ON 12/31/2020] senna (SENOKOT) tablet 8.6 mg  1 tablet Oral Nightly Jazmine Alicea MD        [START ON 12/31/2020] insulin lispro (HUMALOG) injection vial 0-18 Units  0-18 Units Subcutaneous Q4H Jazmine Alicea MD        [START ON 12/31/2020] tamsulosin (FLOMAX) capsule 0.4 mg  0.4 mg Oral Daily Jazmine Alicea MD        insulin lispro (HUMALOG) injection vial 0-18 Units  0-18 Units Subcutaneous Q4H Jazmine Alicea MD   3 Units at 12/30/20 1420    calcium gluconate 1 g in dextrose 5 % 100 mL IVPB  1 g Intravenous MAXWELL Alicea MD   Stopped at 12/30/20 1252    propofol 1000 MG/100ML injection             sodium bicarbonate 8.4 % injection             [START ON 12/31/2020] ticagrelor (BRILINTA) tablet 90 mg  90 mg Oral BID Allison Theodore, APRN - CNP        mupirocin (BACTROBAN) 2 % ointment   Topical Daily Benson Marshall MD           No Known Allergies    Surgical History  Past Surgical History:   Procedure Laterality Date    CARDIAC CATHETERIZATION  10/22/2020    Dr. Regan Rajan- EF 50-55%, PTA Distal RCA    COLONOSCOPY      CORONARY ANGIOPLASTY      CORONARY ANGIOPLASTY Right 06/06/2017    Dr. Florinda Hernandez PTCA-RCA-Rt Wrist   [address]-    Dr. Autumn Morales  1-    2.75 x 18mm Xience Distal RCA Dr. Chicho Tomas Left 12/30/2020    CORONARY ARTERY BYPASS, YULI, POSS. LEFT RADIAL performed by Nicko Blair MD at [address] CATH LAB PROCEDURE      KNEE SURGERY Right 04/2016    POLYPECTOMY      TONSILLECTOMY          Social History  Social History     Socioeconomic History    Marital status:    Tobacco Use    Smoking status: Never Smoker    Smokeless tobacco: Former User     Types: Snuff   Substance and Sexual Activity    Alcohol use: Yes     Comment: occassionally    Drug use: No       Family Medical History  Family History   Problem Relation Age of Onset    Heart Disease Mother     Heart Disease Father     Heart Disease Brother     Heart Disease Maternal Aunt     Heart Disease Maternal Uncle     Heart Disease Maternal Grandfather        Review of Systems:  Constitutional: No fever, no chills  Eyes: No vision changes, no retroorbital pain  ENT: No hearing changes, no ear pain  Respiratory: No cough, no dyspnea  Cardiovascular: Has chest pain, no palpitations  Gastrointestinal: No abdominal pain, no diarrhea  Genitourinary: No dysuria, no hematuria  Integumentary: No rash, has itching  Musculoskeletal: No muscle pain, no joint pain  Neurologic: No headache, has numbness in right foot    Physical Examination:  Vitals:    12/30/20 1320 12/30/20 1400 12/30/20 1500 12/30/20 1600   BP:       Pulse:  72 72 73   Resp: 18 19 17 19   Temp:  99 °F (37.2 °C)  98.8 °F (37.1 °C)   TempSrc:  Bladder  Bladder   SpO2:  95% 96% 96%   Weight:       Height:         Constitutional: Alert, not in distress  Eyes: Sclerae anicteric, no conjunctival erythema  ENT: No buccal lesion, no pharyngeal exudates  Neck: No nuchal rigidity, no cervical adenopathy  Lungs: Clear breath sounds, no crackles, no wheezes  Heart: Regular rate and rhythm, no murmurs  Abdomen:  Bowel sounds present, soft, nontender  Skin: Warm and dry, tender erythematous cystic lesion on right inguinal area. Midsternal incision wound with dressing in place. Musculoskeletal: No joint erythema, no joint swelling    Labs, imaging, and medical records/notes were personally reviewed. Assessment:  Folliculitis, right inguinal area    Plan:  Apply mupirocin topically on affected area twice daily. Start doxycycline 100 mg twice daily. Keep area clean and dry at all times. Continue cefazolin 2 g every 8 hours per standard protocol for perioperative prophylaxis. Thank you for involving me in the care of Lakeshia Segundo. I will continue to follow. Please do not hesitate to call for any questions or concerns.     Electronically signed by Letitia Palma MD on 12/30/2020 at 4:19 PM

## 2020-12-30 NOTE — PROGRESS NOTES
Pt received to room 3813 from OR. Report received from anesthesia. Hemodynamic lines leveled and zeroed, placed on monitor, respiratory here to place on vent.  Merribeth Gal

## 2020-12-30 NOTE — ANESTHESIA PROCEDURE NOTES
Central Venous Line:    A central venous line was placed using ultrasound guidance, in the OR for the following indication(s): central venous access and CVP monitoring. Sterility preparation included the following: hand hygiene performed prior to procedure, maximum sterile barriers used and sterile technique used to drape from head to toe. The patient was placed in Trendelenburg position. The left internal jugular vein was prepped. The site was prepped with Chloraprep. A 8.5 Fr (size), introducer slick was placed. Intravenous verification was obtained by ultrasound and venous blood return. Post insertion care included: all ports aspirated, all ports flushed easily, guidewire removed intact, Biopatch applied, line sutured in place and dressing applied. During the procedure the patient experienced: patient tolerated procedure well with no complications. Insertion site scrubbed per usage guidelines?: Yes  Skin prep agent dried for 3 minutes prior to procedure?:yes  Anesthesia type: general.. No    Additional notes:  Right IJ cannulation attempt with no blood returned, line removed, applied dressing pressure, ultrasound exam showing small hematoma outside the IJ, carotid artery walls intact with no hematoma. After patient was induced and intubated, left IJ was cannulated under ultrasound guidance with no adverse events.   Staffing  Performed: Anesthesiologist   Anesthesiologist: Leonela Boudreaux MD  Preanesthetic Checklist  Completed: patient identified, IV checked, site marked, risks and benefits discussed, surgical consent, monitors and equipment checked, pre-op evaluation, timeout performed, anesthesia consent given, oxygen available and patient being monitored

## 2020-12-30 NOTE — PLAN OF CARE
Problem: Pain:  Goal: Pain level will decrease  Description: Pain level will decrease  Outcome: Met This Shift  Goal: Control of acute pain  Description: Control of acute pain  Outcome: Met This Shift     Problem: Cardiac Output - Decreased:  Goal: Cardiac output within specified parameters  Description: Cardiac output within specified parameters  Outcome: Met This Shift     Problem: Gas Exchange - Impaired:  Goal: Levels of oxygenation will improve  Description: Levels of oxygenation will improve  Outcome: Met This Shift     Problem: Pain:  Goal: Pain level will decrease  Description: Pain level will decrease  Outcome: Met This Shift     Problem: Tissue Perfusion - Cardiopulmonary, Altered:  Goal: Absence of angina  Description: Absence of angina  Outcome: Met This Shift  Goal: Hemodynamic stability will improve  Description: Hemodynamic stability will improve  Outcome: Met This Shift

## 2020-12-30 NOTE — PROGRESS NOTES
Pt awake, alert, follows commands appropriately. VSS. Anesthesia discharge criteria met.  Irvin Vivar

## 2020-12-30 NOTE — PROGRESS NOTES
Patient extubated per order, OG discontinued along with ETT. Placed on BiPap. Instructed to take slow deep breaths. Patient demonstrated understanding. Patient alert, voice hoarse, follows commands, MANRIQUE. Breath sounds remain clear and diminished.  Farrukh Smiling

## 2020-12-31 ENCOUNTER — APPOINTMENT (OUTPATIENT)
Dept: GENERAL RADIOLOGY | Age: 65
DRG: 236 | End: 2020-12-31
Attending: THORACIC SURGERY (CARDIOTHORACIC VASCULAR SURGERY)
Payer: COMMERCIAL

## 2020-12-31 LAB
ANION GAP SERPL CALCULATED.3IONS-SCNC: 14 MMOL/L (ref 7–16)
BUN BLDV-MCNC: 15 MG/DL (ref 8–23)
CALCIUM SERPL-MCNC: 8.8 MG/DL (ref 8.6–10.2)
CHLORIDE BLD-SCNC: 104 MMOL/L (ref 98–107)
CO2: 21 MMOL/L (ref 22–29)
CREAT SERPL-MCNC: 0.8 MG/DL (ref 0.7–1.2)
GFR AFRICAN AMERICAN: >60
GFR NON-AFRICAN AMERICAN: >60 ML/MIN/1.73
GLUCOSE BLD-MCNC: 172 MG/DL (ref 74–99)
HCT VFR BLD CALC: 43 % (ref 37–54)
HEMOGLOBIN: 13.8 G/DL (ref 12.5–16.5)
MAGNESIUM: 2 MG/DL (ref 1.6–2.6)
MCH RBC QN AUTO: 28.3 PG (ref 26–35)
MCHC RBC AUTO-ENTMCNC: 32.1 % (ref 32–34.5)
MCV RBC AUTO: 88.3 FL (ref 80–99.9)
METER GLUCOSE: 146 MG/DL (ref 74–99)
METER GLUCOSE: 147 MG/DL (ref 74–99)
METER GLUCOSE: 153 MG/DL (ref 74–99)
METER GLUCOSE: 161 MG/DL (ref 74–99)
METER GLUCOSE: 192 MG/DL (ref 74–99)
PDW BLD-RTO: 14.2 FL (ref 11.5–15)
PLATELET # BLD: 163 E9/L (ref 130–450)
PMV BLD AUTO: 10.5 FL (ref 7–12)
POTASSIUM SERPL-SCNC: 4.3 MMOL/L (ref 3.5–5)
POTASSIUM SERPL-SCNC: 4.5 MMOL/L (ref 3.5–5)
RBC # BLD: 4.87 E12/L (ref 3.8–5.8)
SODIUM BLD-SCNC: 139 MMOL/L (ref 132–146)
WBC # BLD: 15.3 E9/L (ref 4.5–11.5)

## 2020-12-31 PROCEDURE — 97166 OT EVAL MOD COMPLEX 45 MIN: CPT

## 2020-12-31 PROCEDURE — 2580000003 HC RX 258: Performed by: NURSE PRACTITIONER

## 2020-12-31 PROCEDURE — 6370000000 HC RX 637 (ALT 250 FOR IP): Performed by: NURSE PRACTITIONER

## 2020-12-31 PROCEDURE — 6370000000 HC RX 637 (ALT 250 FOR IP): Performed by: THORACIC SURGERY (CARDIOTHORACIC VASCULAR SURGERY)

## 2020-12-31 PROCEDURE — 97530 THERAPEUTIC ACTIVITIES: CPT

## 2020-12-31 PROCEDURE — 80048 BASIC METABOLIC PNL TOTAL CA: CPT

## 2020-12-31 PROCEDURE — 85027 COMPLETE CBC AUTOMATED: CPT

## 2020-12-31 PROCEDURE — 2140000000 HC CCU INTERMEDIATE R&B

## 2020-12-31 PROCEDURE — C9248 INJ, CLEVIDIPINE BUTYRATE: HCPCS | Performed by: THORACIC SURGERY (CARDIOTHORACIC VASCULAR SURGERY)

## 2020-12-31 PROCEDURE — 36415 COLL VENOUS BLD VENIPUNCTURE: CPT

## 2020-12-31 PROCEDURE — 99232 SBSQ HOSP IP/OBS MODERATE 35: CPT | Performed by: INTERNAL MEDICINE

## 2020-12-31 PROCEDURE — 99233 SBSQ HOSP IP/OBS HIGH 50: CPT | Performed by: NURSE PRACTITIONER

## 2020-12-31 PROCEDURE — 71045 X-RAY EXAM CHEST 1 VIEW: CPT

## 2020-12-31 PROCEDURE — 84132 ASSAY OF SERUM POTASSIUM: CPT

## 2020-12-31 PROCEDURE — 2700000000 HC OXYGEN THERAPY PER DAY

## 2020-12-31 PROCEDURE — 94660 CPAP INITIATION&MGMT: CPT

## 2020-12-31 PROCEDURE — 82962 GLUCOSE BLOOD TEST: CPT

## 2020-12-31 PROCEDURE — 6360000002 HC RX W HCPCS: Performed by: NURSE PRACTITIONER

## 2020-12-31 PROCEDURE — 6360000002 HC RX W HCPCS: Performed by: THORACIC SURGERY (CARDIOTHORACIC VASCULAR SURGERY)

## 2020-12-31 PROCEDURE — 83735 ASSAY OF MAGNESIUM: CPT

## 2020-12-31 PROCEDURE — 94640 AIRWAY INHALATION TREATMENT: CPT

## 2020-12-31 PROCEDURE — 97535 SELF CARE MNGMENT TRAINING: CPT

## 2020-12-31 PROCEDURE — 97162 PT EVAL MOD COMPLEX 30 MIN: CPT

## 2020-12-31 PROCEDURE — 6360000002 HC RX W HCPCS: Performed by: INTERNAL MEDICINE

## 2020-12-31 PROCEDURE — 37799 UNLISTED PX VASCULAR SURGERY: CPT

## 2020-12-31 PROCEDURE — 2580000003 HC RX 258: Performed by: THORACIC SURGERY (CARDIOTHORACIC VASCULAR SURGERY)

## 2020-12-31 PROCEDURE — 6370000000 HC RX 637 (ALT 250 FOR IP): Performed by: INTERNAL MEDICINE

## 2020-12-31 RX ORDER — BISACODYL 10 MG
10 SUPPOSITORY, RECTAL RECTAL DAILY PRN
Status: DISCONTINUED | OUTPATIENT
Start: 2020-12-31 | End: 2021-01-04 | Stop reason: HOSPADM

## 2020-12-31 RX ORDER — DEXTROSE MONOHYDRATE 50 MG/ML
100 INJECTION, SOLUTION INTRAVENOUS PRN
Status: DISCONTINUED | OUTPATIENT
Start: 2020-12-31 | End: 2021-01-04 | Stop reason: HOSPADM

## 2020-12-31 RX ORDER — SENNA AND DOCUSATE SODIUM 50; 8.6 MG/1; MG/1
1 TABLET, FILM COATED ORAL 2 TIMES DAILY
Status: DISCONTINUED | OUTPATIENT
Start: 2020-12-31 | End: 2021-01-04 | Stop reason: HOSPADM

## 2020-12-31 RX ORDER — KETOROLAC TROMETHAMINE 30 MG/ML
15 INJECTION, SOLUTION INTRAMUSCULAR; INTRAVENOUS EVERY 6 HOURS PRN
Status: DISCONTINUED | OUTPATIENT
Start: 2020-12-31 | End: 2021-01-04 | Stop reason: HOSPADM

## 2020-12-31 RX ORDER — ASCORBIC ACID 500 MG
500 TABLET ORAL 2 TIMES DAILY
Status: DISCONTINUED | OUTPATIENT
Start: 2020-12-31 | End: 2021-01-04 | Stop reason: HOSPADM

## 2020-12-31 RX ORDER — ACETAMINOPHEN 325 MG/1
650 TABLET ORAL EVERY 4 HOURS PRN
Status: DISCONTINUED | OUTPATIENT
Start: 2020-12-31 | End: 2021-01-04 | Stop reason: HOSPADM

## 2020-12-31 RX ORDER — DIPHENHYDRAMINE HCL 25 MG
25 TABLET ORAL EVERY 8 HOURS PRN
Status: DISCONTINUED | OUTPATIENT
Start: 2020-12-31 | End: 2021-01-04 | Stop reason: HOSPADM

## 2020-12-31 RX ORDER — FUROSEMIDE 10 MG/ML
20 INJECTION INTRAMUSCULAR; INTRAVENOUS ONCE
Status: COMPLETED | OUTPATIENT
Start: 2020-12-31 | End: 2020-12-31

## 2020-12-31 RX ORDER — ASPIRIN 81 MG/1
81 TABLET ORAL DAILY
Status: DISCONTINUED | OUTPATIENT
Start: 2021-01-01 | End: 2021-01-04 | Stop reason: HOSPADM

## 2020-12-31 RX ORDER — NICOTINE POLACRILEX 4 MG
15 LOZENGE BUCCAL PRN
Status: DISCONTINUED | OUTPATIENT
Start: 2020-12-31 | End: 2021-01-04 | Stop reason: HOSPADM

## 2020-12-31 RX ORDER — AMIODARONE HYDROCHLORIDE 200 MG/1
400 TABLET ORAL 2 TIMES DAILY
Status: DISCONTINUED | OUTPATIENT
Start: 2020-12-31 | End: 2021-01-04 | Stop reason: HOSPADM

## 2020-12-31 RX ORDER — FERROUS SULFATE 325(65) MG
325 TABLET ORAL 2 TIMES DAILY WITH MEALS
Status: DISCONTINUED | OUTPATIENT
Start: 2020-12-31 | End: 2021-01-04 | Stop reason: HOSPADM

## 2020-12-31 RX ORDER — DEXTROSE MONOHYDRATE 25 G/50ML
12.5 INJECTION, SOLUTION INTRAVENOUS PRN
Status: DISCONTINUED | OUTPATIENT
Start: 2020-12-31 | End: 2021-01-04 | Stop reason: HOSPADM

## 2020-12-31 RX ORDER — FOLIC ACID 1 MG/1
1 TABLET ORAL DAILY
Status: DISCONTINUED | OUTPATIENT
Start: 2020-12-31 | End: 2021-01-04 | Stop reason: HOSPADM

## 2020-12-31 RX ORDER — ACETAMINOPHEN 500 MG
1000 TABLET ORAL EVERY 8 HOURS SCHEDULED
Status: DISCONTINUED | OUTPATIENT
Start: 2020-12-31 | End: 2021-01-04 | Stop reason: HOSPADM

## 2020-12-31 RX ORDER — POTASSIUM CHLORIDE 20 MEQ/1
20 TABLET, EXTENDED RELEASE ORAL PRN
Status: DISCONTINUED | OUTPATIENT
Start: 2020-12-31 | End: 2021-01-04 | Stop reason: HOSPADM

## 2020-12-31 RX ADMIN — BISACODYL 5 MG: 5 TABLET, COATED ORAL at 15:36

## 2020-12-31 RX ADMIN — OXYCODONE HYDROCHLORIDE 5 MG: 5 TABLET ORAL at 22:09

## 2020-12-31 RX ADMIN — FENTANYL CITRATE 50 MCG: 50 INJECTION, SOLUTION INTRAMUSCULAR; INTRAVENOUS at 03:15

## 2020-12-31 RX ADMIN — MUPIROCIN: 20 OINTMENT TOPICAL at 22:10

## 2020-12-31 RX ADMIN — AMIODARONE HYDROCHLORIDE 400 MG: 200 TABLET ORAL at 22:09

## 2020-12-31 RX ADMIN — MUPIROCIN: 20 OINTMENT TOPICAL at 08:45

## 2020-12-31 RX ADMIN — INSULIN LISPRO 3 UNITS: 100 INJECTION, SOLUTION INTRAVENOUS; SUBCUTANEOUS at 04:19

## 2020-12-31 RX ADMIN — FENTANYL CITRATE 50 MCG: 50 INJECTION, SOLUTION INTRAMUSCULAR; INTRAVENOUS at 00:45

## 2020-12-31 RX ADMIN — SODIUM CHLORIDE, PRESERVATIVE FREE 10 ML: 5 INJECTION INTRAVENOUS at 08:27

## 2020-12-31 RX ADMIN — CITALOPRAM 20 MG: 20 TABLET, FILM COATED ORAL at 08:26

## 2020-12-31 RX ADMIN — ASPIRIN 81 MG: 81 TABLET, COATED ORAL at 08:27

## 2020-12-31 RX ADMIN — MUPIROCIN: 20 OINTMENT TOPICAL at 08:27

## 2020-12-31 RX ADMIN — PANTOPRAZOLE SODIUM 40 MG: 40 TABLET, DELAYED RELEASE ORAL at 08:25

## 2020-12-31 RX ADMIN — ACETAMINOPHEN 650 MG: 325 TABLET, FILM COATED ORAL at 07:05

## 2020-12-31 RX ADMIN — FUROSEMIDE 20 MG: 20 INJECTION, SOLUTION INTRAMUSCULAR; INTRAVENOUS at 15:50

## 2020-12-31 RX ADMIN — ATORVASTATIN CALCIUM 40 MG: 40 TABLET, FILM COATED ORAL at 08:27

## 2020-12-31 RX ADMIN — ACETAMINOPHEN 650 MG: 325 TABLET, FILM COATED ORAL at 03:06

## 2020-12-31 RX ADMIN — INSULIN LISPRO 3 UNITS: 100 INJECTION, SOLUTION INTRAVENOUS; SUBCUTANEOUS at 11:08

## 2020-12-31 RX ADMIN — MAGNESIUM GLUCONATE 500 MG ORAL TABLET 400 MG: 500 TABLET ORAL at 08:25

## 2020-12-31 RX ADMIN — SODIUM CHLORIDE, PRESERVATIVE FREE 10 ML: 5 INJECTION INTRAVENOUS at 23:02

## 2020-12-31 RX ADMIN — KETOROLAC TROMETHAMINE 15 MG: 30 INJECTION, SOLUTION INTRAMUSCULAR at 23:02

## 2020-12-31 RX ADMIN — Medication 2 G: at 18:00

## 2020-12-31 RX ADMIN — IPRATROPIUM BROMIDE AND ALBUTEROL SULFATE 1 AMPULE: .5; 3 SOLUTION RESPIRATORY (INHALATION) at 12:21

## 2020-12-31 RX ADMIN — FENTANYL CITRATE 50 MCG: 50 INJECTION, SOLUTION INTRAMUSCULAR; INTRAVENOUS at 04:20

## 2020-12-31 RX ADMIN — METOPROLOL TARTRATE 12.5 MG: 25 TABLET, FILM COATED ORAL at 22:10

## 2020-12-31 RX ADMIN — FENTANYL CITRATE 50 MCG: 50 INJECTION, SOLUTION INTRAMUSCULAR; INTRAVENOUS at 02:14

## 2020-12-31 RX ADMIN — INSULIN LISPRO 3 UNITS: 100 INJECTION, SOLUTION INTRAVENOUS; SUBCUTANEOUS at 21:52

## 2020-12-31 RX ADMIN — FOLIC ACID 1 MG: 1 TABLET ORAL at 15:36

## 2020-12-31 RX ADMIN — Medication 2 G: at 00:47

## 2020-12-31 RX ADMIN — OXYCODONE HYDROCHLORIDE 10 MG: 10 TABLET ORAL at 07:05

## 2020-12-31 RX ADMIN — DOXYCYCLINE HYCLATE 100 MG: 100 CAPSULE ORAL at 22:09

## 2020-12-31 RX ADMIN — Medication 2 G: at 08:27

## 2020-12-31 RX ADMIN — TAMSULOSIN HYDROCHLORIDE 0.4 MG: 0.4 CAPSULE ORAL at 08:25

## 2020-12-31 RX ADMIN — MUPIROCIN: 20 OINTMENT TOPICAL at 22:11

## 2020-12-31 RX ADMIN — OXYCODONE HYDROCHLORIDE AND ACETAMINOPHEN 500 MG: 500 TABLET ORAL at 22:10

## 2020-12-31 RX ADMIN — TICAGRELOR 90 MG: 90 TABLET ORAL at 22:09

## 2020-12-31 RX ADMIN — TICAGRELOR 90 MG: 90 TABLET ORAL at 08:25

## 2020-12-31 RX ADMIN — IPRATROPIUM BROMIDE AND ALBUTEROL SULFATE 1 AMPULE: .5; 3 SOLUTION RESPIRATORY (INHALATION) at 16:49

## 2020-12-31 RX ADMIN — FERROUS SULFATE TAB 325 MG (65 MG ELEMENTAL FE) 325 MG: 325 (65 FE) TAB at 17:17

## 2020-12-31 RX ADMIN — KETOROLAC TROMETHAMINE 15 MG: 30 INJECTION, SOLUTION INTRAMUSCULAR at 17:17

## 2020-12-31 RX ADMIN — DOCUSATE SODIUM 50 MG AND SENNOSIDES 8.6 MG 1 TABLET: 8.6; 5 TABLET, FILM COATED ORAL at 15:36

## 2020-12-31 RX ADMIN — ACETAMINOPHEN 1000 MG: 500 TABLET ORAL at 22:09

## 2020-12-31 RX ADMIN — ACETAMINOPHEN 1000 MG: 500 TABLET ORAL at 08:49

## 2020-12-31 RX ADMIN — FENTANYL CITRATE 50 MCG: 50 INJECTION, SOLUTION INTRAMUSCULAR; INTRAVENOUS at 05:27

## 2020-12-31 RX ADMIN — DOXYCYCLINE HYCLATE 100 MG: 100 CAPSULE ORAL at 08:27

## 2020-12-31 RX ADMIN — DOCUSATE SODIUM 50 MG AND SENNOSIDES 8.6 MG 1 TABLET: 8.6; 5 TABLET, FILM COATED ORAL at 08:27

## 2020-12-31 RX ADMIN — FENTANYL CITRATE 50 MCG: 50 INJECTION, SOLUTION INTRAMUSCULAR; INTRAVENOUS at 06:27

## 2020-12-31 RX ADMIN — OXYCODONE HYDROCHLORIDE 10 MG: 10 TABLET ORAL at 03:06

## 2020-12-31 RX ADMIN — OXYCODONE HYDROCHLORIDE AND ACETAMINOPHEN 500 MG: 500 TABLET ORAL at 15:39

## 2020-12-31 RX ADMIN — CLEVIPIDINE 2 MG/HR: 0.5 EMULSION INTRAVENOUS at 05:12

## 2020-12-31 RX ADMIN — IPRATROPIUM BROMIDE AND ALBUTEROL SULFATE 1 AMPULE: .5; 3 SOLUTION RESPIRATORY (INHALATION) at 20:24

## 2020-12-31 RX ADMIN — INSULIN GLARGINE 17 UNITS: 100 INJECTION, SOLUTION SUBCUTANEOUS at 21:51

## 2020-12-31 RX ADMIN — OXYCODONE HYDROCHLORIDE 10 MG: 10 TABLET ORAL at 11:08

## 2020-12-31 RX ADMIN — IPRATROPIUM BROMIDE AND ALBUTEROL SULFATE 1 AMPULE: .5; 3 SOLUTION RESPIRATORY (INHALATION) at 08:03

## 2020-12-31 RX ADMIN — OXYCODONE HYDROCHLORIDE 10 MG: 10 TABLET ORAL at 15:38

## 2020-12-31 RX ADMIN — CLEVIPIDINE 2 MG/HR: 0.5 EMULSION INTRAVENOUS at 02:40

## 2020-12-31 RX ADMIN — Medication 5000 UNITS: at 08:26

## 2020-12-31 RX ADMIN — AMIODARONE HYDROCHLORIDE 400 MG: 200 TABLET ORAL at 08:50

## 2020-12-31 RX ADMIN — METOPROLOL TARTRATE 12.5 MG: 25 TABLET, FILM COATED ORAL at 08:48

## 2020-12-31 RX ADMIN — FENTANYL CITRATE 50 MCG: 50 INJECTION, SOLUTION INTRAMUSCULAR; INTRAVENOUS at 07:45

## 2020-12-31 RX ADMIN — INSULIN LISPRO 3 UNITS: 100 INJECTION, SOLUTION INTRAVENOUS; SUBCUTANEOUS at 08:13

## 2020-12-31 ASSESSMENT — PAIN SCALES - GENERAL
PAINLEVEL_OUTOF10: 9
PAINLEVEL_OUTOF10: 8
PAINLEVEL_OUTOF10: 6
PAINLEVEL_OUTOF10: 10
PAINLEVEL_OUTOF10: 7
PAINLEVEL_OUTOF10: 9
PAINLEVEL_OUTOF10: 8
PAINLEVEL_OUTOF10: 6
PAINLEVEL_OUTOF10: 5
PAINLEVEL_OUTOF10: 7
PAINLEVEL_OUTOF10: 3

## 2020-12-31 ASSESSMENT — PAIN DESCRIPTION - FREQUENCY
FREQUENCY: INTERMITTENT
FREQUENCY: CONTINUOUS

## 2020-12-31 ASSESSMENT — PAIN - FUNCTIONAL ASSESSMENT: PAIN_FUNCTIONAL_ASSESSMENT: PREVENTS OR INTERFERES SOME ACTIVE ACTIVITIES AND ADLS

## 2020-12-31 ASSESSMENT — PAIN DESCRIPTION - PAIN TYPE
TYPE: SURGICAL PAIN
TYPE: SURGICAL PAIN

## 2020-12-31 ASSESSMENT — PAIN DESCRIPTION - DESCRIPTORS
DESCRIPTORS: DISCOMFORT;PRESSURE
DESCRIPTORS: ACHING;DISCOMFORT
DESCRIPTORS: ACHING;DISCOMFORT
DESCRIPTORS: DISCOMFORT

## 2020-12-31 ASSESSMENT — PAIN DESCRIPTION - ORIENTATION
ORIENTATION: MID
ORIENTATION: MID

## 2020-12-31 ASSESSMENT — PAIN DESCRIPTION - ONSET: ONSET: ON-GOING

## 2020-12-31 ASSESSMENT — PAIN DESCRIPTION - LOCATION: LOCATION: CHEST

## 2020-12-31 NOTE — PROGRESS NOTES
Date: 12/31/2020    Time: 1:03 AM    Patient Placed On BIPAP/CPAP/ Non-Invasive Ventilation? Pt already on bipap    If no must comment. Facial area red/color change? No           If YES are Blister/Lesion present? No   If yes must notify nursing staff  BIPAP/CPAP skin barrier?   Yes    Skin barrier type:mepilexlite       Comments:        Clyde Ramirez

## 2020-12-31 NOTE — PLAN OF CARE
Problem: Pain:  Goal: Pain level will decrease  Description: Pain level will decrease  18/92/3072 [id number] by Jennifer Saeed RN  Outcome: Ongoing  12/30/2020 1335 by Cholo Savage RN  Outcome: Met This Shift  Goal: Control of acute pain  Description: Control of acute pain  12/30/2020 1335 by Cholo Savage RN  Outcome: Met This Shift     Problem: Anxiety:  Goal: Level of anxiety will decrease  Description: Level of anxiety will decrease  Outcome: Met This Shift     Problem: Gas Exchange - Impaired:  Goal: Levels of oxygenation will improve  Description: Levels of oxygenation will improve  14/35/8701 1833 by Jennifer Saeed RN  Outcome: Met This Shift  12/30/2020 1335 by Cholo Savage RN  Outcome: Met This Shift  Goal: Ability to maintain adequate ventilation will improve  Description: Ability to maintain adequate ventilation will improve  Outcome: Met This Shift     Problem: Pain:  Goal: Pain level will decrease  Description: Pain level will decrease  11/10/1192 [id number] by Jennifer Saeed RN  Outcome: Ongoing  12/30/2020 1335 by Cholo Savage RN  Outcome: Met This Shift

## 2020-12-31 NOTE — PLAN OF CARE
Problem: Pain:  Goal: Pain level will decrease  Description: Pain level will decrease  12/31/2020 0754 by Nieves Siegel RN  Outcome: Met This Shift     Problem: Discharge Planning:  Goal: Discharged to appropriate level of care  Description: Discharged to appropriate level of care  Outcome: Met This Shift     Problem:  Activity Intolerance:  Goal: Able to perform prescribed physical activity  Description: Able to perform prescribed physical activity  Outcome: Met This Shift     Problem: Fluid Volume - Imbalance:  Goal: Ability to achieve a balanced intake and output will improve  Description: Ability to achieve a balanced intake and output will improve  Outcome: Met This Shift     Problem: Gas Exchange - Impaired:  Goal: Levels of oxygenation will improve  Description: Levels of oxygenation will improve  12/31/2020 0754 by Nieves Siegel RN  Outcome: Met This Shift     Problem: Gas Exchange - Impaired:  Goal: Ability to maintain adequate ventilation will improve  Description: Ability to maintain adequate ventilation will improve  12/31/2020 0754 by Nieves Siegel RN  Outcome: Met This Shift     Problem: Pain:  Goal: Pain level will decrease  Description: Pain level will decrease  12/31/2020 0754 by Nieves Siegel RN  Outcome: Met This Shift     Problem: Tissue Perfusion - Cardiopulmonary, Altered:  Goal: Absence of angina  Description: Absence of angina  Outcome: Met This Shift

## 2020-12-31 NOTE — PROGRESS NOTES
CVICU Progress Note    Name: Moreno Rivero  MRN: [id number]    CC: Postoperative Critical Care Management      Indication for Surgery/Procedure: : H/O PCI, CAD  LVEF:  Normal    RVF:  Normal      Important/Relevant PMH/PSH: Hx inferior MI (JACOB RCA 10/10); JACOB distal RCA (1/13); PTCA to RCA secondary to in-stent restenosis (10/22/2020), HTN, HLD, PVCs, DMII, OCTAVIO with home CPAP use     Procedure/Surgeries: 12/30/2020 Sternotomy/CABG x 2 (LIMA-LAD, SVG-RCA)/SHOAIB exclusion with 40 mm AtriClip/Open RLE GSV harvest/Rigid internal fixation of the sternum with KLS plates x 2     Pacing wires: Ventricular          Intake/Output Summary (Last 24 hours) at 12/31/2020 0814  Last data filed at 12/31/2020 0700  Gross per 24 hour   Intake 4323.53 ml   Output 3330 ml   Net 993.53 ml       Recent Labs     12/30/20  1050 12/31/20  0410   WBC 15.4* 15.3*   HGB 14.1 13.8   HCT 42.2 43.0    163      Recent Labs     12/30/20  1050 12/30/20  1050 12/30/20  1306 12/30/20  2055 12/31/20  0410     --   --   --  139   K 4.3   < > 4.40 4.6 4.3     --   --   --  104   CO2 22  --   --   --  21*   BUN 15  --   --   --  15   CREATININE 0.9  --   --   --  0.8   GLUCOSE 155*  --   --   --  172*   CALCIUM 7.5*  --   --   --  8.8    < > = values in this interval not displayed. Physical Exam:    /69   Pulse 72   Temp 99.7 °F (37.6 °C) (Bladder)   Resp 20   Ht 5' 8\" (1.727 m)   Wt 278 lb 3.5 oz (126.2 kg)   SpO2 97%   BMI 42.30 kg/m²       CXR Findings:       Impression   Findings suggest small bilateral pleural effusions and/or   atelectasis/infiltrates. -  CXR personally viewed and interpreted by ICU Nurse Practitioner, agree with above findings     General: Awake, alert. C/o incisional pain causing difficulty taking deep breaths. Denies palpitations   Eyes: PERRL, anicteric   Pulmonary: Diminished bibasilar.  No wheezes, no accessory muscle use noted   Cardiovascular:  RRR, no heaves or thrills on palpation  Tele: SR with PVCs  Abdomen: Soft, nontender, hypoactive BS   Extremities: Palpable pulses all extremities, no edema   Neurologic/Psych: A&Ox3, MANRIQUE to command   Skin: Warm and dry  Incisions: MSI with ARMINDA intact, RLE SVG sites with ace wrap       Assessment/Plan: POD #1    1. CAD S/p CABG x 2 (LIMA-LAD, SVG-RCA)/SHOAIB exclusion with 40 mm AtriClip  - ASA/Brilinta, Lipitor, metoprolol started   - Ancef   - MS chest tubes removed without difficulty, pleural drains placed to bulb suction     2. Acute Postoperative Respiratory Insufficiency  - 2/2 surgery, extubated DOS to BiPAP  - On BiPAP overnight, now on 6 L O2 NC, sats 94%  - Continue EZPAP q 4 hours, encourage IS, instructed to C&DB, OOBTC with PT/OT, increase activity as tolerated, wean O2 as able for sats >92%  - h/o OCTAVIO with home CPAP use-- okay to use home unit on transfer, hospital unit if needed for supplemental      3. HTN  -On Cleviprex gtt overnight, start metoprolol 12.5mg BID, monitor BP and adjust as needed/able      4. Acute Post Operative Pain   - Pain control suboptimal, add PRN toradol for BTP, tylenol ATC, encourage use of PO PRN narcotics for optimal relief     5. DMII  -Hemoglobin A1c 6.1  -SSI , nightly lantus for glycemia control     6.  Folliculitis   - ID following, started on mupirocin topically on affected area BID, doxycycline 100 mg BID  -Keep area clean and dry at all times    VTE Prophylaxis: Pharmacologic/Mechanical:  Yes, SCDs   Line infection prevention: Can CVC or arterial line be removed: yes  Continued need for urinary catheter: no, remove     Dispo: Transfer to [facility]     Electronically signed by JOHN Steele CNP on 12/31/2020 at 8:14 AM

## 2020-12-31 NOTE — PROGRESS NOTES
OCCUPATIONAL THERAPY INITIAL EVALUATION      Date:2020  Patient Name: Kilo Ureña  MRN: [id number]  : 1955  Room: [address] [place] [address]    Referring Provider:  Jazmine Alicea MD      Evaluating OT: Jacek Valencia OTR/L [id number]    AM-PAC Daily Activity Raw Score: 15/24    Recommended Adaptive Equipment:  LB dressing AE, shower seat       Diagnosis: CAD  Surgery/Procedures:  CABG x 2     Pertinent Medical History: CAD, HLD, HTN, MI, reports h/o back problems and neuropathy in R foot    Precautions:  Falls, sternal, O2     Home Living: Pt lives with wife/daughter  in a 2 story home with 3 step(s) to enter and 1 rail(s); bed/bath on 2nd floor: flight with rail. Pt can stay on first floor. Bathroom setup: walk in shower with seat, standard commode  Equipment owned: shower seat    Prior Level of Function: IND with ADLs;  IND with IADLs. No device for ambulation. Driving: yes  Occupation:     Pain Level: pt c/o 7/10 chest/neck pain  this session      Cognition: A&O: 4/4    Follows 1-2 step commands appropriately.    Memory: Good   Comprehension Good   Problem solving: Fair+/Good   Judgement/safety: Fair+/Good               Communication skills: WFL           Vision: WFL               Glasses: yes                                                Hearing: WFL     RASS: 0  CAM-ICU: (NT) Delirium    UE Assessment:  Hand Dominance: Right [x]  Left []     ROM Strength STM goal: PRN   RUE  Grossly WFL within precautions Not formally tested; grossly WFL              WNL for ADLS     LUE Grossly WFL within precautions Not formally tested; grossly WFL              WNL for ADLS       Sensation: No c/o numbness or tingling in extremities   Tone: WNL   Edema: B LE     Functional Assessment   Initial Eval Status  Date:  Treatment Status  Date: STG=LTG  5-7days   Feeding IND                       IND  while seated up in chair to increase activity tolerance        Grooming Min A Sumit   while standing sink level demonstrating G tolerance; G balance. UB dressing/bathing Mod A                       Sumit   demonstrating G knowledge of precautions during tasks     LB dressing/bathing Dep    Max A  after instruction on LB dressing AE for safe reach within precautions                       Sumit  using AE as needed for safe reach/ energy conservation       Toileting NT                       Sumit     Bed Mobility  Supine to sit: Mod A+2 with HOB elevated    Sit to supine:NT                       Min A  in prep of ADL tasks & transfers   Functional Transfers Sit to stand: Min A  from higher bed surface;    Min A from lower chair surface    Stand to sit: Min A                       Sumit  sit<>stand/functional bathroom transfers using AD/DME as needed for balance and safety   Functional Mobility Min A  no device                        Sumit   functional/bathroom mobility using AD as needed & demonstrating G safety     Balance Sitting:     Static:  SBA    Dynamic:Min A  Standing: Min A  Sumit dynamic sitting balance; Sumit dynamic standing balance  during ADL tasks & transfers   Endurance/Activity Tolerance   F tolerance with moderate activity.     G   tolerance with moderate activity/self care routine   Visual/  Perceptual               WFL                            Vitals:   HR at rest: 76 bpm HR at end of session: 81 bpm   Spo2 at rest:94% Spo2 at end of session 93%   BP at rest:150/75 mmHg BP at end of session 129/95 mmHg     Assessment of current deficits   [x]Functional mobility   [x]ADLs [x]Strength  []Cognition  [x]Functional transfers  [x]IADLs [x]Safety Awareness  [x]Endurance  []Fine Motor Coordination  [x]Balance      []Vision/perception  []Sensation    []Gross Motor Coordination  [x]ROM  []Delirium                  []Communication     Plan of Care: 1-3 days/week for 1-2 weeks PRN   ADL retraining/adapted techniques and AE recommendations to increase functional independence within precautions                    Energy conservation techniques to improve tolerance for selfcare routine   Functional transfer/mobility training/DME recommendations for increased independence, safety and fall prevention         Patient/family education to increase safety and functional independence             Environmental modifications for safe mobility and completion of ADLs                             Therapeutic activity to improve functional performance during ADLs. Therapeutic exercise to improve tolerance and functional strength for ADLs   Balance retraining/tolerance tasks for facilitation of postural control with dynamic challenges during ADLs . Treatment: OT intervention provided to achieve goals:   Functional mobility: Instruction on sternal precautions to facilitate safe bed mobility & functional transfers. Pt required 2 person assist for safe mobility due to complexity of medical condition, medical lines and deconditioning. HOB elevated to assist with transfer; G follow through of precautions. ADL retraining: Instruction on adapted dressing techniques/equipment to maintain sternal precautions during ADLs. Pt educated on use of reacher and sock aid; demo G follow through. Energy Conservation training: Education on postural awareness/positioning and breathing techniques to improve overall tolerance and participation in self care routine. Pt demonstrated fair tolerance. Review of recommended DME for fall prevention, bathroom safety & energy conservation. Pt/Family Education: Provided with handouts on energy conservation and sternal precautions during functional activities for safe return home. Pt/family demonstrates G understanding. Line management and environmental modifications made prior to and end of session to ensure patient safety and to increase efficiency of session.    Skilled monitoring of HR, O2 saturation, blood pressure and patient's response to activity performed throughout session. Comments: OK from RN to see patient. Upon arrival, patient supine in bed; agreeable to session. At end of session, patient assisted to bedside chair. Call light within reach, all lines and tubes intact. Pt instructed on use of call light for assistance and fall prevention. Patient presents with decreased activity tolerance, dynamic balance, functional mobility limiting completion of ADLs and safety. Pt can benefit from continued skilled OT to increase safety and functional independence. Evaluation Complexity: Moderate    · History: Expanded chart review of consults, imaging, and psychosocial history related to current functional performance. · Exam: 5+ performance deficits identified limiting functional independence and safe return home   · Assistance/Modification: Min/mod assistance or modifications required to perform tasks. May have comorbidities that affect occupational performance. Rehab Potential: Good for established goals    Patient / Family Goal: return to PLOF    Patient and/or family were instructed/educated on diagnosis, prognosis/goals and plan of care. Pt demonstrated G understanding. [] Malnutrition indicators have been identified and nursing has been notified to ensure a dietitian consult is ordered.       Time In:0930              Time Out: 1000       Total Treatment Time: 23          Min Units   OT Eval Low 06353     OT Eval Medium 70050 X    OT Eval High 73963     OT Re-Eval [id number]     Therapeutic Ex 84734     Therapeutic Activities 01016 15 1   ADL/Self Care 67177 8 1   Orthotic Management 63643     Neuro Re-Ed 20880     Non-Billable Time       Evaluation time includes thorough review of current medical information, gathering information on past medical history/social history and prior level of function, completion of standardized testing/informal observation of tasks, assessment of data and development of POC/Goals.      Alex Wright, OTR/L [id number]

## 2020-12-31 NOTE — PROGRESS NOTES
PROGRESS NOTE     CARDIOLOGY    Chief complaint: Seen today for follow up, management & recommendations for CAD    He denies chest pain or shortness of breath today. He was sitting in a chair at the side of the bed. He was comfortable and in no distress. Wt Readings from Last 3 Encounters:   12/31/20 278 lb 3.5 oz (126.2 kg)   12/23/20 255 lb (115.7 kg)   11/10/20 262 lb (118.8 kg)     Temp Readings from Last 3 Encounters:   12/31/20 97.7 °F (36.5 °C) (Temporal)   12/30/20 98.4 °F (36.9 °C)   12/23/20 97.8 °F (36.6 °C) (Oral)     BP Readings from Last 3 Encounters:   12/31/20 (!) 158/74   12/30/20 (!) 162/81   12/23/20 134/63     Pulse Readings from Last 3 Encounters:   12/31/20 74   12/23/20 60   11/10/20 (!) 47         Intake/Output Summary (Last 24 hours) at 12/31/2020 1301  Last data filed at 12/31/2020 1200  Gross per 24 hour   Intake 1523.53 ml   Output 1645 ml   Net -121.47 ml       Recent Labs     12/30/20  1050 12/31/20  0410   WBC 15.4* 15.3*   HGB 14.1 13.8   HCT 42.2 43.0   MCV 85.4 88.3    163     Recent Labs     12/30/20  1050 12/30/20  1050 12/30/20  1306 12/30/20  2055 12/31/20  0410     --   --   --  139   K 4.3   < > 4.40 4.6 4.3     --   --   --  104   CO2 22  --   --   --  21*   BUN 15  --   --   --  15   CREATININE 0.9  --   --   --  0.8   MG 2.4  --   --   --  2.0    < > = values in this interval not displayed. Recent Labs     12/30/20  1050   PROTIME 12.4   INR 1.1     No results for input(s): CKTOTAL, CKMB, CKMBINDEX, TROPONINI in the last 72 hours. No results for input(s): BNP in the last 72 hours. No results for input(s): CHOL, HDL, TRIG in the last 72 hours.     Invalid input(s): CHOLHDLR, LDLCALCU          metoprolol tartrate (LOPRESSOR) tablet 12.5 mg, BID      acetaminophen (TYLENOL) tablet 1,000 mg, 3 times per day      amiodarone (CORDARONE) tablet 400 mg, BID      ketorolac (TORADOL) injection 15 mg, Q6H PRN      atorvastatin (LIPITOR) tablet 40 mg, Daily      citalopram (CELEXA) tablet 20 mg, Daily      vitamin D (CHOLECALCIFEROL) tablet 5,000 Units, Daily      0.9 % sodium chloride infusion, Continuous      sodium chloride flush 0.9 % injection 10 mL, 2 times per day      sodium chloride flush 0.9 % injection 10 mL, PRN      ondansetron (ZOFRAN) injection 4 mg, Q8H PRN      aspirin EC tablet 81 mg, Daily      acetaminophen (TYLENOL) tablet 650 mg, Q4H PRN      acetaminophen (TYLENOL) suppository 650 mg, Q4H PRN      oxyCODONE (ROXICODONE) immediate release tablet 5 mg, Q4H PRN    Or      oxyCODONE HCl (OXY-IR) immediate release tablet 10 mg, Q4H PRN      fentaNYL (SUBLIMAZE) injection 25 mcg, Q1H PRN    Or      fentaNYL (SUBLIMAZE) injection 50 mcg, Q1H PRN      magnesium oxide (MAG-OX) tablet 400 mg, Daily      mupirocin (BACTROBAN) 2 % ointment, BID      sennosides-docusate sodium (SENOKOT-S) 8.6-50 MG tablet 1 tablet, BID      magnesium hydroxide (MILK OF MAGNESIA) 400 MG/5ML suspension 30 mL, Daily PRN      pantoprazole (PROTONIX) tablet 40 mg, Daily      ceFAZolin (ANCEF) 2 g in sterile water 20 mL IV syringe, Q8H      potassium chloride 20 mEq/50 mL IVPB (Central Line), PRN      magnesium sulfate 2 g in 50 mL IVPB premix, PRN      ipratropium-albuterol (DUONEB) nebulizer solution 1 ampule, Q4H WA      albumin human 5 % IV solution 25 g, PRN      0.9 % sodium chloride bolus, Continuous PRN      norepinephrine (LEVOPHED) 16 mg in dextrose 5% 250 mL infusion, Continuous PRN      clevidipine (CLEVIPREX) infusion, Continuous PRN      insulin regular (HUMULIN R;NOVOLIN R) 100 Units in sodium chloride 0.9 % 100 mL infusion, Continuous PRN      insulin glargine (LANTUS) injection vial 17 Units, Nightly      glucose (GLUTOSE) 40 % oral gel 15 g, PRN      dextrose 50 % IV solution, PRN      glucagon (rDNA) injection 1 mg, PRN      dextrose 5 % solution, PRN      senna (SENOKOT) tablet 8.6 mg, Nightly      insulin lispro (HUMALOG) injection vial 0-18 Units, Q4H      tamsulosin (FLOMAX) capsule 0.4 mg, Daily      calcium gluconate 1 g in dextrose 5 % 100 mL IVPB, PRN      ticagrelor (BRILINTA) tablet 90 mg, BID      mupirocin (BACTROBAN) 2 % ointment, BID      doxycycline hyclate (VIBRAMYCIN) capsule 100 mg, 2 times per day        Review of systems:     Heart: as above   Lungs: as above   Eyes: denies changes in vision or discharge. Ears: denies changes in hearing or pain. Nose: denies epistaxis or masses   Throat: denies sore throat or trouble swallowing. Neuro: denies numbness, tingling, tremors. Skin: denies rashes or itching. : denies hematuria, dysuria   GI: denies vomiting, diarrhea   Psych: denies mood changed, anxiety, depression. Physical exam:    Constitutional: A&O x3, communicates well, no acute distress. Eyes: extraocular muscles intact, PERRL. Normal lids & conjunctiva. No icterus. ENT: clear, no bleeding. No external masses. Lips normal formation. Neck: supple, full ROM, no JVD, no bruits, no lymphadenopathy. No masses. trachea midline. Heart: regular rate & rhythm, normal S1 & S2, I/VI (normal physiologic) systolic murmur, S4 gallop. No heave. Lungs: Poor air movement. Bibasilar rales. .  No accessory muscles. Abd: soft, non-tender. Normal bowel sounds. Obese. Neuro: Full ROM X 4, EOMI, no tremors. EXT: Mild bilateral lower extremity edema  Skin: warm, dry, intact. Good turgor. Psych: A&O x 3, normal behavior, not anxious. Assessment/Recommendations  1. Postop day 1 CABG with LIMA to the LAD and SVG to the RCA. Hemodynamically stable. On no pressors. Extubated. 2. Hypertension. Metoprolol has been started. 3. Hypercholesterolemia  4. Diabetes  5. Obesity  6. Sleep apnea  7. Hypervolemic today. I will give lasix today. Note: This report was completed using computerized voice recognition software.  Every effort has been made to ensure accuracy, however; and invert and computerized transcription errors may be present.

## 2020-12-31 NOTE — OP NOTE
[address]                  [address] [place],  [facility]                                OPERATIVE REPORT    PATIENT NAME: Lisy Rivera                  :        1955  MED REC NO:   [id number]                            ROOM:       [id number]  ACCOUNT NO:   [de-identified]                           ADMIT DATE: 2020  PROVIDER:     Radha Lane MD    DATE OF PROCEDURE:  2020    PREOPERATIVE DIAGNOSES:  History of PCI, severe multivessel coronary  artery disease, morbid obesity with a BMI of 39, hypertension, and  hyperlipidemia. POSTOPERATIVE DIAGNOSES:  History of PCI, severe multivessel coronary  artery disease, morbid obesity with a BMI of 39, hypertension, and  hyperlipidemia. INDICATIONS:  History of PCI, severe multivessel coronary artery  disease, morbid obesity with a BMI of 39, hypertension, and  hyperlipidemia. SURGEON:  Radha Lane MD    ASSISTANT:  Enedina Rinaldi NP (no other resident was adequately trained  to be able to assist). SECOND ASSISTANT:  NOAH Lazaro; and Magaly Schwartzma Heri Garcia harvested the vein). COMPLICATIONS:  None, tolerated well. ESTIMATED BLOOD LOSS:  Approximately 1000 mL. ANESTHESIA:  General endotracheal.    ANESTHESIA ATTENDING:  Jena Lim MD    SPECIMENS:  None. DRAINS:  Two in the mediastinum, one in the left chest, one in the right  chest.    OPERATIONS PERFORMED:  1. Sternotomy. 2.  Coronary artery bypass grafting x2; left internal mammary artery to  the LAD, saphenous vein graft to the right coronary artery. 3.  Left atrial appendage exclusion with a 40-mm AtriClip. 4.  Open greater saphenous vein harvest, right lower extremity. 5.  Rigid internal fixation of sternum using KLS plates x2.     HISTORY:  This is a 61-year-old man who had a history of PCI and was  admitted about three months ago, where he was found to have bad in-stent stenosis of his right coronary artery. PTCA was done and his LAD was  interrogated and noted to be significant and therefore, he was referred  for coronary artery bypass grafting. The patient was described the  procedure in full including risks and complications, including but not  limited to bleeding, infection, need for reoperation, hemothorax,  pneumothorax, stroke, myocardial infarction, death; and the patient  agreed to proceed. FINDINGS:  Preoperative YULI revealed preserved ejection fraction without  any significant valvular abnormalities. Intraoperatively, left internal  mammary artery was a good conduit for bypass. The vein was good conduit  for bypass. The right coronary artery was 2.5 mm vessel and an  excellent target for bypass. The LAD was a 1.7 mm vessel and a fair  target for bypass. The patient did have several episodes of atrial  fibrillation in the operating room and is at high risk for postoperative  atrial fibrillation, so I did place AtriClip on his left atrial  appendage. The patient was  from cardiopulmonary bypass  without the assistance of any inotropic support. Postoperative YULI  revealed preserved ejection without any new valvular abnormalities. Because of the patient's morbid obesity and diabetic, he is at high risk  for sternal wound infection; therefore, I did rigid internal fixation of  the sternum using KLS plates x2. The patient was transferred to  cardiothoracic intensive care unit in stable but guarded condition. DESCRIPTION OF OPERATION:  After adequate informed consent was obtained  and adequate preoperative antibiotics were given, the patient was  brought to the operating room in stable condition. He was laid in  supine position and induced under general endotracheal anesthesia by  Anesthesia staff. Vasques catheter and adequate monitoring lines were  placed.   The patient's chest, abdomen, pelvis, and lower extremities were prepped and draped in the usual sterile fashion. Right lower  extremity greater saphenous vein was harvested in an open technique and  prepared on the back table for anastomosis. These wounds were closed in  multiple layers of absorbable stitch. Standard sternotomy was  performed. Left internal mammary artery was harvested in a pedicle  fashion. The patient was systemically heparinized and the mammary was  clipped distally and transected. Excellent pulsatile flow was noted. It was injected with papaverine, clipped distally, and placed in the  left chest.  Standard retractor was placed. The pericardium was entered  and tacked to the chest wall. After ensuring adequate ACT, the patient  was instituted on cardiopulmonary bypass by cannulating the ascending  aorta using an 18-Nigerien aortic cannula in the right atrial appendage  using a small two-stage venous cannula. Retrograde catheter and root  vent were placed. The aorta was cross clamped and the heart was  arrested with cold blood antegrade and retrograde cardioplegia. This  was given intermittently throughout the remainder of the operation. Topical ice was also used. The vein was brought into the field. It was  grafted to the right coronary artery using 7-0 Prolene. It was brought  to the ascending aorta and proximal anastomosis was created using 6-0  Prolene. A 40-mm AtriClip was placed at the base of the left atrial  appendage. The mammary was brought into the field, grafted to the LAD  in its mid to distal portion using 7-0 Prolene. The pedicle was tacked  to the epicardium. Warm antegrade and retrograde hotshots were given,  and the cross clamp was released. The patient returned in sinus rhythm. The patient easily weaned from cardiopulmonary bypass without the  assistance of inotropic support. The patient was decannulated in the  usual fashion and protamine was given.   Mediastinum was inspected for hemostasis. Hemostasis was achieved using Bovie electrocautery and  stitches. Two drains were placed in the mediastinum, one in the left  chest and one in the right chest.  A single ventricular pacing wire was  placed. With the patient warm, hemodynamically stable and in sinus  rhythm, I closed the chest using #6 steel wires including double wires. Because the patient is morbidly obese and diabetic, he is at high risk  for sternal wound infection; therefore, I did a rigid internal fixation  of the sternum using KLS plates x2. The remainder of the wound was  closed in multiple layers of absorbable stitch. Dressings were applied  over top. The patient tolerated the procedure well. The patient was  transferred to cardiothoracic intensive care in stable, but guarded  condition. All sponge, instrument, and needle counts were correct at  the end of the case.         Cristina Christopher MD    D: 12/30/2020 10:18:02       T: 12/30/2020 13:05:43     [email or web address]  Job#: [id number]     Doc#: [id number]    CC:  MD Jose Guadalupe Mckeon MD Alben Altes, MD

## 2020-12-31 NOTE — PROGRESS NOTES
Physical Therapy  Physical Therapy Initial Assessment     Name: Devaughn Rosa  : 1955  MRN: [id number]    Referring Provider:  Neftali Álvarez MD    Date of Service: 2020    Evaluating PT:  Anaid Julian PT, DPT [id number]    Room #:  9805/3925-D  Diagnosis:  CAD  Precautions: Falls, Sternal, O2  Procedure/Surgery:   CABG x 2  PMHx/PSHx:  HLD, HTN, MI  Equipment Needs:  None    SUBJECTIVE:    Pt lives with wife and daughter in a 2 story home with 3 stairs to enter and 1 rail. Full flight of steps and 1 rail to bedroom. Pt ambulated with no device and independent PTA. OBJECTIVE:   Initial Evaluation  Date: 20 Treatment Short Term/ Long Term   Goals   AM-PAC 6 Clicks 84/39     Was pt agreeable to Eval/treatment? Yes     Does pt have pain?  6-7/10 surgical and neck pain     Bed Mobility  Rolling: NT  Supine to sit: ModA x 2 with HOB elevated  Sit to supine: NT  Scooting: MaxA  Ethan   Transfers Sit to stand: Ethan  Stand to sit: Ethan  Stand pivot: Ethan no device  Independent   Ambulation   150 feet with Ethan no device  >400 feet Independently   Stair negotiation: ascended and descended NT  >4 steps with 1 rail Mod Independent   ROM BUE:  Defer to OT note  BLE:  WNL     Strength BUE:  Defer to OT note  BLE:  4+/5  Increase by 1/3 MMT grade   Balance Sitting EOB:  Ethan  Dynamic Standing:  Ethan no device  Sitting EOB:  Independent  Dynamic Standing:  Independent     Pt is A & O x 4  CAM-ICU: NT  RASS: 0  Sensation:  WNL  Edema:  none    Vitals:  Heart Rate at rest 103 bpm Heart Rate post session 113 bpm   SpO2 at rest 94% SpO2 post session 93%   Blood Pressure at rest 94/65 mmHg Blood Pressure post session 102/76 mmHg     Functional Status Score-Intensive Care Unit (FSS-ICU)   Rolling -/7   Supine to sit transfer 2/7   Unsupported sitting  4/7   Sit to stand transfers 4/7   Ambulation 4/   Total  14       Therapeutic Exercises:  NA    Patient education  Pt educated on safety    Patient response to education:   Pt verbalized understanding Pt demonstrated skill Pt requires further education in this area   x x x     ASSESSMENT:    Comments:  RN reported pt was stable for session. Pt was in bed upon arrival, agreeable to initial evaluation. Pt was educated on sternal precautions and PLB prior to activity. Pt completed transfer to chair initially. Instructed pt on sit to stand from lower surface height. Pt ambulated with a wide MITESH and mild unsteadiness. SOB and fatigue limited activity. Pt was left in chair with all needs met and call light in reach. All lines remained intact. Assistance of two required due to acuity of medical condition, complex medical lines management as well as overall deconditioning of patient. Treatment:  Patient practiced and was instructed in the following treatment:     Bed mobility training - pt given verbal and tactile cues to facilitate proper sequencing and safety during supine>sit as well as provided with physical assistance to complete task    Sitting EOB for >8 minutes for upright tolerance, postural awareness and BLE ROM   Transfer training - pt was given verbal and tactile cues to facilitate proper hand placement, technique and safety during sit to stand and stand to sit as well as provided with physical assistance to complete task.  Gait training- pt was given verbal and tactile cues to facilitate safety and balance during ambulation as well as provided with physical assistance to complete task. Pt's/ family goals   1. Return home    Patient and or family understand(s) diagnosis, prognosis, and plan of care.   Yes    PLAN OF CARE:    Current Treatment Recommendations     [x] Strengthening     [] ROM   [x] Balance Training   [x] Endurance Training   [x] Transfer Training   [x] Gait Training   [x] Stair Training   [] Positioning   [x] Safety and Education Training   [x] Patient/Caregiver Education   [] HEP  [] Other     Frequency of treatments:

## 2020-12-31 NOTE — PROGRESS NOTES
Date: 12/30/2020    Time: 10:57 PM    Patient Placed On BIPAP/CPAP/ Non-Invasive Ventilation? Yes    If no must comment. Facial area red/color change? No           If YES are Blister/Lesion present? No   If yes must notify nursing staff  BIPAP/CPAP skin barrier?   Yes    Skin barrier type:mepilexlite       Comments:        Jenn Mccann

## 2020-12-31 NOTE — PROGRESS NOTES
[facility] PROGRESS NOTE      Chief complaint: Follow-up of right inguinal folliculitis    The patient is a 63 y.o. male with history of CAD, hypertension, hyperlipidemia, admitted on 12/30 for elective CABG for significant LAD disease. Postop, he was noted to have erythema on the right groin prompting subsequent ID consult. He reports having tried popping a pimple on his right groin 2 days prior to admission. He reports no fever and chills, no drainage from the right groin skin lesion.     On admission, he was afebrile and hemodynamically stable with leukocytosis of 15,000. Cefazolin was started on admission for perioperative prophylaxis    Subjective: Patient was seen and examined. No chills, no abdominal pain, no diarrhea, no rash, no itching. He reports no further pain on his right inguinal skin lesion. Objective:    Vitals:    12/31/20 1507   BP: 137/80   Pulse: 74   Resp: 24   Temp: 98.2 °F (36.8 °C)   SpO2: 95%     Constitutional: Alert, not in distress  Respiratory: Clear breath sounds, no crackles, no wheezes  Cardiovascular: Regular rate and rhythm, no murmurs  Gastrointestinal: Bowel sounds present, soft, nontender  Skin: Warm and dry, cystic lesion on right inguinal area. Musculoskeletal: No joint swelling, no joint erythema    Labs, imaging, and medical records/notes were personally reviewed. Assessment:  Folliculitis, right inguinal area    Recommendations:  Apply mupirocin topically on affected area twice daily. Continue doxycycline 100 mg twice daily. Keep area clean and dry at all times. Continue cefazolin 2 g every 8 hours per standard protocol for perioperative prophylaxis.     Thank you for involving me in the care of [facility]. I will continue to follow. Please do not hesitate to call for any questions or concerns.     Electronically signed by Bharat Douglas MD on 12/31/2020 at 10:02 AM

## 2021-01-01 ENCOUNTER — APPOINTMENT (OUTPATIENT)
Dept: GENERAL RADIOLOGY | Age: 66
DRG: 236 | End: 2021-01-01
Attending: THORACIC SURGERY (CARDIOTHORACIC VASCULAR SURGERY)
Payer: COMMERCIAL

## 2021-01-01 LAB
ANION GAP SERPL CALCULATED.3IONS-SCNC: 10 MMOL/L (ref 7–16)
BUN BLDV-MCNC: 24 MG/DL (ref 8–23)
CALCIUM SERPL-MCNC: 9.1 MG/DL (ref 8.6–10.2)
CHLORIDE BLD-SCNC: 98 MMOL/L (ref 98–107)
CO2: 24 MMOL/L (ref 22–29)
CREAT SERPL-MCNC: 0.9 MG/DL (ref 0.7–1.2)
GFR AFRICAN AMERICAN: >60
GFR NON-AFRICAN AMERICAN: >60 ML/MIN/1.73
GLUCOSE BLD-MCNC: 148 MG/DL (ref 74–99)
HCT VFR BLD CALC: 39.1 % (ref 37–54)
HEMOGLOBIN: 13.2 G/DL (ref 12.5–16.5)
MCH RBC QN AUTO: 29.3 PG (ref 26–35)
MCHC RBC AUTO-ENTMCNC: 33.8 % (ref 32–34.5)
MCV RBC AUTO: 86.7 FL (ref 80–99.9)
METER GLUCOSE: 128 MG/DL (ref 74–99)
METER GLUCOSE: 138 MG/DL (ref 74–99)
METER GLUCOSE: 160 MG/DL (ref 74–99)
METER GLUCOSE: 166 MG/DL (ref 74–99)
PDW BLD-RTO: 14.2 FL (ref 11.5–15)
PLATELET # BLD: 139 E9/L (ref 130–450)
PMV BLD AUTO: 10.3 FL (ref 7–12)
POTASSIUM SERPL-SCNC: 3.9 MMOL/L (ref 3.5–5)
RBC # BLD: 4.51 E12/L (ref 3.8–5.8)
SODIUM BLD-SCNC: 132 MMOL/L (ref 132–146)
WBC # BLD: 12.7 E9/L (ref 4.5–11.5)

## 2021-01-01 PROCEDURE — 71045 X-RAY EXAM CHEST 1 VIEW: CPT

## 2021-01-01 PROCEDURE — 2700000000 HC OXYGEN THERAPY PER DAY

## 2021-01-01 PROCEDURE — 2580000003 HC RX 258: Performed by: NURSE PRACTITIONER

## 2021-01-01 PROCEDURE — 6360000002 HC RX W HCPCS: Performed by: NURSE PRACTITIONER

## 2021-01-01 PROCEDURE — 94640 AIRWAY INHALATION TREATMENT: CPT

## 2021-01-01 PROCEDURE — 85027 COMPLETE CBC AUTOMATED: CPT

## 2021-01-01 PROCEDURE — 6370000000 HC RX 637 (ALT 250 FOR IP): Performed by: NURSE PRACTITIONER

## 2021-01-01 PROCEDURE — 2140000000 HC CCU INTERMEDIATE R&B

## 2021-01-01 PROCEDURE — 6370000000 HC RX 637 (ALT 250 FOR IP): Performed by: PHYSICIAN ASSISTANT

## 2021-01-01 PROCEDURE — 6360000002 HC RX W HCPCS: Performed by: PHYSICIAN ASSISTANT

## 2021-01-01 PROCEDURE — 36415 COLL VENOUS BLD VENIPUNCTURE: CPT

## 2021-01-01 PROCEDURE — 80048 BASIC METABOLIC PNL TOTAL CA: CPT

## 2021-01-01 PROCEDURE — 82962 GLUCOSE BLOOD TEST: CPT

## 2021-01-01 PROCEDURE — 94669 MECHANICAL CHEST WALL OSCILL: CPT

## 2021-01-01 RX ORDER — FUROSEMIDE 10 MG/ML
20 INJECTION INTRAMUSCULAR; INTRAVENOUS ONCE
Status: COMPLETED | OUTPATIENT
Start: 2021-01-01 | End: 2021-01-01

## 2021-01-01 RX ADMIN — ACETAMINOPHEN 1000 MG: 500 TABLET ORAL at 21:56

## 2021-01-01 RX ADMIN — OXYCODONE HYDROCHLORIDE 10 MG: 10 TABLET ORAL at 15:32

## 2021-01-01 RX ADMIN — BISACODYL 5 MG: 5 TABLET, COATED ORAL at 09:00

## 2021-01-01 RX ADMIN — OXYCODONE HYDROCHLORIDE 5 MG: 5 TABLET ORAL at 06:49

## 2021-01-01 RX ADMIN — POTASSIUM CHLORIDE 20 MEQ: 20 TABLET, EXTENDED RELEASE ORAL at 09:01

## 2021-01-01 RX ADMIN — KETOROLAC TROMETHAMINE 15 MG: 30 INJECTION, SOLUTION INTRAMUSCULAR at 21:55

## 2021-01-01 RX ADMIN — DOCUSATE SODIUM 50 MG AND SENNOSIDES 8.6 MG 1 TABLET: 8.6; 5 TABLET, FILM COATED ORAL at 09:00

## 2021-01-01 RX ADMIN — INSULIN LISPRO 3 UNITS: 100 INJECTION, SOLUTION INTRAVENOUS; SUBCUTANEOUS at 17:51

## 2021-01-01 RX ADMIN — MUPIROCIN: 20 OINTMENT TOPICAL at 22:21

## 2021-01-01 RX ADMIN — METOPROLOL TARTRATE 25 MG: 25 TABLET, FILM COATED ORAL at 21:56

## 2021-01-01 RX ADMIN — TICAGRELOR 90 MG: 90 TABLET ORAL at 21:56

## 2021-01-01 RX ADMIN — DOXYCYCLINE HYCLATE 100 MG: 100 CAPSULE ORAL at 09:00

## 2021-01-01 RX ADMIN — AMIODARONE HYDROCHLORIDE 400 MG: 200 TABLET ORAL at 21:56

## 2021-01-01 RX ADMIN — MAGNESIUM GLUCONATE 500 MG ORAL TABLET 400 MG: 500 TABLET ORAL at 09:00

## 2021-01-01 RX ADMIN — OXYCODONE HYDROCHLORIDE AND ACETAMINOPHEN 500 MG: 500 TABLET ORAL at 21:56

## 2021-01-01 RX ADMIN — Medication 5000 UNITS: at 09:00

## 2021-01-01 RX ADMIN — OXYCODONE HYDROCHLORIDE 5 MG: 5 TABLET ORAL at 21:55

## 2021-01-01 RX ADMIN — IPRATROPIUM BROMIDE AND ALBUTEROL SULFATE 1 AMPULE: .5; 3 SOLUTION RESPIRATORY (INHALATION) at 05:17

## 2021-01-01 RX ADMIN — TICAGRELOR 90 MG: 90 TABLET ORAL at 09:01

## 2021-01-01 RX ADMIN — ATORVASTATIN CALCIUM 40 MG: 40 TABLET, FILM COATED ORAL at 08:59

## 2021-01-01 RX ADMIN — IPRATROPIUM BROMIDE AND ALBUTEROL SULFATE 1 AMPULE: .5; 3 SOLUTION RESPIRATORY (INHALATION) at 12:26

## 2021-01-01 RX ADMIN — MUPIROCIN: 20 OINTMENT TOPICAL at 09:03

## 2021-01-01 RX ADMIN — FUROSEMIDE 20 MG: 10 INJECTION, SOLUTION INTRAVENOUS at 10:56

## 2021-01-01 RX ADMIN — FOLIC ACID 1 MG: 1 TABLET ORAL at 10:56

## 2021-01-01 RX ADMIN — INSULIN GLARGINE 17 UNITS: 100 INJECTION, SOLUTION SUBCUTANEOUS at 22:21

## 2021-01-01 RX ADMIN — IPRATROPIUM BROMIDE AND ALBUTEROL SULFATE 1 AMPULE: .5; 3 SOLUTION RESPIRATORY (INHALATION) at 21:39

## 2021-01-01 RX ADMIN — DOCUSATE SODIUM 50 MG AND SENNOSIDES 8.6 MG 1 TABLET: 8.6; 5 TABLET, FILM COATED ORAL at 21:56

## 2021-01-01 RX ADMIN — OXYCODONE HYDROCHLORIDE 10 MG: 10 TABLET ORAL at 10:55

## 2021-01-01 RX ADMIN — Medication 2 G: at 01:32

## 2021-01-01 RX ADMIN — SODIUM CHLORIDE, PRESERVATIVE FREE 10 ML: 5 INJECTION INTRAVENOUS at 01:32

## 2021-01-01 RX ADMIN — DOXYCYCLINE HYCLATE 100 MG: 100 CAPSULE ORAL at 21:55

## 2021-01-01 RX ADMIN — ASPIRIN 81 MG: 81 TABLET, COATED ORAL at 09:00

## 2021-01-01 RX ADMIN — KETOROLAC TROMETHAMINE 15 MG: 30 INJECTION, SOLUTION INTRAMUSCULAR at 05:13

## 2021-01-01 RX ADMIN — OXYCODONE HYDROCHLORIDE AND ACETAMINOPHEN 500 MG: 500 TABLET ORAL at 09:00

## 2021-01-01 RX ADMIN — FERROUS SULFATE TAB 325 MG (65 MG ELEMENTAL FE) 325 MG: 325 (65 FE) TAB at 17:48

## 2021-01-01 RX ADMIN — METOPROLOL TARTRATE 25 MG: 25 TABLET, FILM COATED ORAL at 09:02

## 2021-01-01 RX ADMIN — AMIODARONE HYDROCHLORIDE 400 MG: 200 TABLET ORAL at 09:01

## 2021-01-01 RX ADMIN — TAMSULOSIN HYDROCHLORIDE 0.4 MG: 0.4 CAPSULE ORAL at 09:02

## 2021-01-01 RX ADMIN — FERROUS SULFATE TAB 325 MG (65 MG ELEMENTAL FE) 325 MG: 325 (65 FE) TAB at 09:02

## 2021-01-01 RX ADMIN — ACETAMINOPHEN 1000 MG: 500 TABLET ORAL at 05:55

## 2021-01-01 RX ADMIN — KETOROLAC TROMETHAMINE 15 MG: 30 INJECTION, SOLUTION INTRAMUSCULAR at 13:25

## 2021-01-01 RX ADMIN — PANTOPRAZOLE SODIUM 40 MG: 40 TABLET, DELAYED RELEASE ORAL at 09:03

## 2021-01-01 RX ADMIN — CITALOPRAM 20 MG: 20 TABLET, FILM COATED ORAL at 09:00

## 2021-01-01 RX ADMIN — INSULIN LISPRO 3 UNITS: 100 INJECTION, SOLUTION INTRAVENOUS; SUBCUTANEOUS at 06:55

## 2021-01-01 ASSESSMENT — PAIN SCALES - GENERAL
PAINLEVEL_OUTOF10: 6
PAINLEVEL_OUTOF10: 3
PAINLEVEL_OUTOF10: 0
PAINLEVEL_OUTOF10: 5

## 2021-01-01 NOTE — PLAN OF CARE
Problem: Pain:  Goal: Pain level will decrease  Description: Pain level will decrease  Outcome: Ongoing     Problem:  Activity Intolerance:  Goal: Ability to tolerate increased activity will improve  Description: Ability to tolerate increased activity will improve  Outcome: Ongoing     Problem: Anxiety:  Goal: Level of anxiety will decrease  Description: Level of anxiety will decrease  Outcome: Ongoing     Problem: Pain:  Goal: Pain level will decrease  Description: Pain level will decrease  Outcome: Ongoing

## 2021-01-01 NOTE — CONSULTS
Cardiac and Carb Control diet education provided in addition to contact information for outpatient dietitian if further education needed/desired. Thank you for your consult.         Electronically signed by Gabrielle Davis MS, RD, LD on 1/1/2021 at 9:55 AM

## 2021-01-01 NOTE — PROGRESS NOTES
[facility] PROGRESS NOTE      Chief complaint: Follow-up of right inguinal folliculitis    The patient is a 63 y.o. male with history of CAD, hypertension, hyperlipidemia, admitted on 12/30 for elective CABG for significant LAD disease. Postop, he was noted to have erythema on the right groin prompting subsequent ID consult. He reports having tried popping a pimple on his right groin 2 days prior to admission. He reports no fever and chills, no drainage from the right groin skin lesion.     On admission, he was afebrile and hemodynamically stable with leukocytosis of 15,000. Cefazolin was started on admission for perioperative prophylaxis    Subjective: Patient was seen and examined. No chills, no abdominal pain, no diarrhea, no rash, no itching. He reports no further pain on his right inguinal skin lesion. Objective:    Vitals:    01/01/21 0830   BP: 114/72   Pulse: 78   Resp: 20   Temp: 97.3 °F (36.3 °C)   SpO2: 98%     Constitutional: Alert, not in distress  Respiratory: Clear breath sounds, no crackles, no wheezes  Cardiovascular: Regular rate and rhythm, no murmurs  Gastrointestinal: Bowel sounds present, soft, nontender  Skin: Warm and dry, ruptured lesion with spot bleeding on right inguinal area. Musculoskeletal: No joint swelling, no joint erythema    Labs, imaging, and medical records/notes were personally reviewed. Assessment:  Folliculitis, right inguinal area    Recommendations:  Apply mupirocin topically on affected area twice daily. Continue doxycycline 100 mg twice daily for 7 days from 12/30-01/05. Keep area clean and dry at all times.     Thank you for involving me in the care of [employer]. I will continue to follow. Please do not hesitate to call for any questions or concerns.     Electronically signed by Jennifer Theodore MD on 1/1/2021 at 10:11 AM

## 2021-01-01 NOTE — PROGRESS NOTES
POD#2  Awake, alert. No major complaints. Denies CP, palpitations, SOB at rest, dizziness/lightheadedness. Vitals:    01/01/21 0000 01/01/21 0443 01/01/21 0648 01/01/21 0830   BP: 132/60 (!) 176/70  114/72   Pulse: 95 84  78   Resp: 20 20 20   Temp: 98.3 °F (36.8 °C) 97.9 °F (36.6 °C)  97.3 °F (36.3 °C)   TempSrc: Temporal Tympanic  Temporal   SpO2: 92% 95%  98%   Weight:   273 lb 9.6 oz (124.1 kg)    Height:         O2: 5 L/NC      Intake/Output Summary (Last 24 hours) at 1/1/2021 0918  Last data filed at 1/1/2021 0908  Gross per 24 hour   Intake 940 ml   Output 1250 ml   Net -310 ml           Recent Labs     12/30/20  1050 12/31/20  0410 01/01/21  0514   WBC 15.4* 15.3* 12.7*   HGB 14.1 13.8 13.2   HCT 42.2 43.0 39.1    163 139      Recent Labs     12/30/20  1050 12/31/20  0410 01/01/21  0514   BUN 15 15 24*   CREATININE 0.9 0.8 0.9         PE  Cardiac: RRR  Lungs: decreased bases bilat; upper airway mucus congestion  Chest incision with intact ARMINDA DSD. Sternum stable. Chest tubes x 2 and Epicardial pacing wires present and secure. Abd: Soft, nontender, +BS  Ext: RLE incision c/d/i- staples in place; chronic LE edema b/l       A/P: POD# 2      1. CAD S/p CABG x 2 (LIMA-LAD, SVG-RCA)/SHOAIB exclusion with 40 mm AtriClip  - ASA/Brilinta, Lipitor, metoprolol started   - MS chest tubes removed; cont pleural tubes to bulb     2. Acute Postoperative Respiratory Insufficiency  - 2/2 surgery, extubated DOS to BiPAP  - On BiPAP overnight, now on 5 L O2 NC, sats 94%  - Continue EZPAP q 4 hours, encourage IS, instructed to C&DB, OOBTC with PT/OT, increase activity as tolerated, wean O2 as able for sats >92%  - h/o OCTAVIO with home CPAP use-- okay to use home unit on transfer, hospital unit if needed for supplemental   -per dr Casey Dove consult pulm  - will give lasix     3. HTN  -On Cleviprex gtt overnight, started  metoprolol 12.5mg BID in cvic, increase to 25mg     4.  Acute Post Operative Pain   - Pain control suboptimal, add PRN toradol for BTP, tylenol ATC, encourage use of PO PRN narcotics for optimal relief     5. DMII  -Hemoglobin A1c 6.1  -SSI , nightly lantus for glycemia control      6.  Folliculitis   - ID following, started on mupirocin topically on affected area BID, doxycycline 100 mg BID  -Keep area clean and dry at all times     DVT prophy- SCDs- add lovenox tomorrow then can stop when brilinta resumed    Dispo: home vs. Rehab pending progression in activity level in next couple of days        This patient's case and care plan was discussed with the attending surgeon

## 2021-01-01 NOTE — DISCHARGE INSTR - DIET
 Good nutrition is important when healing from an illness, injury, or surgery. Follow any nutrition recommendations given to you during your hospital stay.  If you were given an oral nutrition supplement while in the hospital, continue to take this supplement at home. You can take it with meals, in-between meals, and/or before bedtime. These supplements can be purchased at most local grocery stores, pharmacies, and chain super-stores.  If you have any questions about your diet or nutrition, call the outpatient dietitian at [phone] if further education/instruction needed. Carb Control Diet     Foods with carbohydrates make your blood glucose level go up. Learning how to count carbohydrates can help you control your blood glucose levels. First, identify the foods you eat that contain carbohydrates. Then, using the Foods with Carbohydrates chart, determine about how much carbohydrates are in your meals and snacks. Make sure you are eating foods with fiber, protein, and healthy fat along with your carbohydrate foods. Foods with Carbohydrates  The following table shows carbohydrate foods that have about 15 grams of carbohydrate each. Using measuring cups, spoons, or a food scale when you first begin learning about carbohydrate counting can help you learn about the portion sizes you typically eat.   The following foods have 15 grams carbohydrate each:   Grains  1 slice bread (1 ounce)   1 small tortilla (6-inch size)   ¼ large bagel (1 ounce)   1/3 cup pasta or rice (cooked)   ½ hamburger or hot dog bun (¾ ounce)   ½ cup cooked cereal   ½ to ¾ cup ready-to-eat cereal   2 taco shells (5-inch size) Fruit  1 small fresh fruit (¾ to 1 cup)   ½ medium banana   17 small grapes (3 ounces)   1 cup melon or berries   ½ cup canned or frozen fruit   2 tablespoons dried fruit (blueberries, cherries, cranberries, raisins)   ½ cup unsweetened fruit juice   Starchy Vegetables  ½ cup cooked beans, peas, corn, Facts labels to find out how many grams of carbohydrate are in a food you want to eat. Dont forget: sometimes serving sizes on the label arent the same as how much food you are going to eat, so you may need to calculate how much carbohydrate is in the food you are serving yourself.      Carbohydrate Counting for People with Diabetes Sample 1-Day Menu View Nutrient Info   Breakfast ¾ cup yogurt, low fat, low sugar (1 carbohydrate serving)   ½ cup cereal, ready-to-eat, unsweetened (1 carbohydrate serving)   1 cup strawberries (1 carbohydrate serving)   ¼ cup almonds (½ carbohydrate serving)   Lunch 1, 5 ounce can chunk light tuna   2 ounces cheese, low fat cheddar   6 whole wheat crackers (1 carbohydrate serving)   1 small apple (1½ carbohydrate servings)   ½ cup carrots (½ carbohydrate serving)   ½ cup snap peas   1 cup 1% milk (1 carbohydrate serving)    Evening Meal Stir mendoza made with: 3 ounces chicken   1 cup brown rice (3 carbohydrate servings)   ½ cup broccoli (½ carbohydrate serving)   ½ cup green beans   ¼ cup onions   1 tablespoon olive oil   2 tablespoons teriyaki sauce (½ carbohydrate serving)   Evening Snack 1 extra small banana (1 carbohydrate serving)   1 tablespoon peanut butter   Daily Sum   Nutrient Unit Value   Macronutrients   Energy kcal 1723   Energy kJ 7221   Protein g 121   Total lipid (fat) g 58   Carbohydrate, by difference g 195   Fiber, total dietary g 30   Sugars, total g 80   Minerals   Calcium, Ca mg 1757   Iron, Fe mg 22   Sodium, Na mg 1679   Vitamins   Vitamin C, total ascorbic acid mg 227   Vitamin A, IU IU 54902   Vitamin D    Lipids   Fatty acids, total saturated g 12   Fatty acids, total monounsaturated g 31   Fatty acids, total polyunsaturated g 12   Cholesterol mg 169     Carbohydrate Counting for People with Diabetes Vegan Sample 1-Day Menu View Nutrient Info   Breakfast 1 cup cooked oatmeal (2 carbohydrate servings)   ½ cup blueberries (1 carbohydrate serving)   2 tablespoons flaxseeds   1 cup soymilk fortified with calcium and vitamin D   1 cup coffee   Lunch 2 slices whole wheat bread (2 carbohydrate servings)   ½ cup baked tofu   ¼ cup lettuce   2 slices tomato   2 slices avocado   ½ cup baby carrots (½ carbohydrate serving)   1 orange (1 carbohydrate serving)   1 cup soymilk fortified with calcium and vitamin D    Evening Meal Burrito made with: 1 6-inch corn tortilla (1 carbohydrate serving)   1 cup refried vegetarian beans (2 carbohydrate servings)   ¼ cup chopped tomatoes   ¼ cup lettuce   ¼ cup salsa   1/3 cup brown rice (1 carbohydrate serving)   1 tablespoon olive oil for rice   ½ cup zucchini    Evening Snack 6 small whole grain crackers (1 carbohydrate serving)   2 apricots (½ carbohydrate serving)   ¼ cup unsalted peanuts (½ carbohydrate serving)    Daily Sum   Nutrient Unit Value   Macronutrients   Energy kcal 1679   Energy kJ 7019   Protein g 74   Total lipid (fat) g 68   Carbohydrate, by difference g 212   Fiber, total dietary g 47   Sugars, total g 43   Minerals   Calcium, Ca mg 1219   Iron, Fe mg 17   Sodium, Na mg 2285   Vitamins   Vitamin C, total ascorbic acid mg 115   Vitamin A, IU IU 23351   Vitamin D    Lipids   Fatty acids, total saturated g 10   Fatty acids, total monounsaturated g 30   Fatty acids, total polyunsaturated g 22   Cholesterol mg 0     Carbohydrate Counting for People with Diabetes Vegetarian (Lacto-Ovo) Sample 1-Day Menu View Nutrient Info   Breakfast 1 cup cooked oatmeal (2 carbohydrate servings)   ½ cup blueberries (1 carbohydrate serving)   2 tablespoons flaxseeds   1 egg   1 cup 1% milk (1 carbohydrate serving)   1 cup coffee   Lunch 2 slices whole wheat bread (2 carbohydrate servings)   2 ounces low-fat cheese   ¼ cup lettuce   2 slices tomato   2 slices avocado   ½ cup baby carrots (½ carbohydrate serving)   1 orange (1 carbohydrate serving)   1 cup unsweetened tea   Evening Meal Burrito made with: 1 6-inch corn tortilla (1 failure usually limits the sodium you get from food and drinks to 2,000 milligrams per day. Salt is the main source of sodium. Read the nutrition label to find out how much sodium is in 1 serving of a food. Select foods with 140 milligrams of sodium or less per serving. Foods with more than 300 milligrams of sodium per serving may not fit into a reduced-sodium meal plan. Check serving sizes. If you eat more than 1 serving, you will get more sodium than the amount listed. Cutting Back on Sodium  Avoid processed foods. Eat more fresh foods. Fresh and frozen fruits and vegetables without added juices or sauces are naturally low in sodium. Fresh meats are lower in sodium than processed meats, such as cortes, sausage, and hot dogs. Read the nutrition label or ask your  to help you find a fresh meat that is low in sodium. Eat less salt, at the table and when cooking. Just 1 teaspoon of table salt has 2,300 milligrams of sodium. Leave the salt out of recipes for pasta, casseroles, and soups. Ask your RDN how to cook your favorite recipes without sodium. Be a smart . Look for food packages that say salt-free or sodium-free.  These items contain less than 5 milligrams of sodium per serving. Very-low-sodium products contain less than 35 milligrams of sodium per serving. Low-sodium products contain less than 140 milligrams of sodium per serving. Unsalted or no added salt products may still be high in sodium. Check the nutrition label. Add flavors to your food without adding sodium. Try lemon juice, lime juice, fruit juice, or vinegar. Dry or fresh herbs add flavor. Try basil, bay leaf, dill, rosemary, parsley, anabelle, dry mustard, nutmeg, thyme, and paprika. Pepper, red pepper flakes, and cayenne pepper can add spice to your meals without adding sodium. Hot sauce contains sodium, but if you use just a drop or two, it will not add up to much.    Buy a sodium-free seasoning blend or make your own at home. Use caution when you eat outside your home. Restaurant foods can be very high in sodium. Ask for nutrition information. Many restaurants provide nutrition facts on their menus or websites. Let your  know that you want your food to be cooked without salt. Ask for your salad dressing and sauces to come on the side.   Fluid Restriction  Your doctor may ask you to follow a fluid restriction in addition to taking diuretics (water pills). Ask your doctor how much fluid you can have. Foods that are liquid at room temperature are considered a fluid, such as popsicles, soup, ice cream, and Jell-O. Here are some common conversions that will help you measure your fluid intake every day:  1,000 milliliters = 1 liter or 4 cups 1 fluid ounce = 30 milliliters   1 cup = 240 milliliters 2,000 milliliters = 2 liters or 8 cups   1,500 milliliters = 1½ liters or 6 cups    Weight Monitoring  Weigh yourself each day. Sudden weight gain is a sign that fluid is building up in your body. Follow these guidelines:  Weigh yourself every morning. If you gain 3 or more pounds in 1-2 days or 5 or more pounds within 1 week, call your doctor. Your doctor may adjust your medicine to get rid of the extra fluid. Talk with your doctor or RDN about what a healthy weight is for you. Talk with your doctor to find out what type of physical activity is best for you.     Foods Recommended  Food Group Recommended Foods   Grains Bread with less than 80 milligrams sodium per slice (yeast breads usually have less sodium than those made with baking soda)  Homemade bread made with reduced-sodium baking soda  Many cold cereals, especially shredded wheat and puffed rice  Oats, grits, or cream of wheat  Dry pastas, noodles, quinoa, and rice   Vegetables Fresh and frozen vegetables without added sauces, salt, or sodium  Homemade soups (salt free or low sodium)  Low-sodium or sodium-free canned vegetables and soups Fruits Fresh and canned fruits  Dried fruits, such as raisins, cranberries, and prunes   Dairy (Milk and Milk Products) Milk or milk powder  Rice milk and soy milk  Yogurt, including Greek yogurt  Small amounts of natural, block cheese or reduced-sodium cheese (Swiss, ricotta, and fresh mozzarella are lower in sodium than others)  Regular or soft cream cheese and low-sodium cottage cheese   Protein Foods (Meat, Poultry, Fish, Beans) Fresh meats and fish  [place] ([facility]) corets (except if packaged in a sodium solution)  Canned or packed tuna (no more than 4 ounces at 1 serving)  Dried beans and peas; edamame (fresh soybeans)  Eggs or egg beaters (if less than 200 mg per serving)  Unsalted nuts or peanut butter   Desserts and Snacks Fresh fruit or applesauce  Moises food cake  Granola bars  Unsalted pretzels, popcorn, or nuts  Pudding or Jell-O with Cool-Whip topping  Homemade rice-crispy treats  Vanilla wafers  Frozen fruit bars   Fats Tub or liquid margarine  Unsaturated fat oils (canola, olive, corn, sunflower, safflower, peanut)   Condiments Fresh or dried herbs; low-sodium ketchup; vinegar; lemon or lime juice; pepper; salt-free seasoning mixes and marinades (Mrs. Janelle Raygoza or Rikki salt-free blend); simple salad dressings (vinegar and oil); salt-free sauces     Foods Not Recommended  Food Group Foods Not Recommended   Grains Breads or crackers topped with salt  Cereals (hot/cold) with more than 300 milligrams sodium per serving  Biscuits, cornbread, and other quick breads prepared with baking soda  Prepackaged bread crumbs  Self-rising flours   Vegetables Canned vegetables (unless they are salt free or low sodium)  Frozen vegetables with seasoning and sauces  Sauerkraut and pickled vegetables  Canned or dried soups (unless they are salt free or low sodium)  Western Bess[place] fries and onion rings   Fruits Dried fruits preserved with sodium-containing additives   Dairy (Milk and Milk Products) Buttermilk  Processed cheeses such as Cheese [employer], [employer], and [employer] (unless a low-sodium variety)  Feta cheese; shredded cheese (has more sodium than block cheese); singles slices and string cheese   Protein Foods (Meat, Poultry, Fish, Beans) Cured meats: cortes, ham, sausage, pepperoni, and hot dogs  Canned meats: chili, Kaycee sausage, sardines, and Spam  Smoked fish and meats  Frozen meals that have more than 600 milligrams sodium   Fats Salted butter or margarine   Condiments Salt, sea salt, kosher salt, onion salt, and garlic salt  Seasoning mixes containing salt (Lemon Pepper or Bouillon cubes)  Catsup or ketchup, BBQ sauce, Worcestershire and soy sauce  Salsa, pickles, olives, relish  Salad dressings: ranch, blue cheese, [place], and Western Bessie    Alcohol Check with your doctor.      Heart Failure Sample 1-Day Menu View Nutrient Info   Breakfast 1 cup regular oatmeal made with water or milk   1 cup reduced-fat (2%) milk   1 medium banana   1 slice whole wheat bread   1 tablespoon salt-free peanut butter   Morning Snack 1/2 cup dried cranberries   Lunch 3 ounces grilled chicken breast   1 cup salad greens   Olive oil and vinegar dressing (for greens)   5 unsalted or low-sodium crackers   Fruit plate with 1/4 cup strawberries   1/2 sliced orange (for fruit plate)   1 peach half (for fruit plate)   Afternoon Snack 1 ounce low-sodium turkey   1 piece whole wheat bread   Evening Meal 3 ounces herb-baked fish   1 baked potato   2 teaspoons soft margarine (trans fat-free) (for potato)   Sliced tomatoes   1/2 cup steamed spinach drizzled with lemon juice   3-inch square of kaden food cake   Fresh strawberries (2) (for cake)   Evening Snack 2 tablespoons salt-free peanut butter   5 low-sodium crackers   Daily Sum   Nutrient Unit Value   Macronutrients   Energy kcal 1890   Energy kJ 7906   Protein g 95   Total lipid (fat) g 56   Carbohydrate, by difference g 270   Fiber, total dietary g 31   Sugars, total g 99   Minerals   Calcium, Ca mg powder   1 baked potato   1 tablespoon margarine, soft, tub   ½ cup cooked spinach with:   Squeeze of lemon   1 cup fat-free milk   Evening Snack 1 tablespoon peanut butter, without salt   1 apple   Daily Sum   Nutrient Unit Value   Macronutrients   Energy kcal 1847   Energy kJ 7734   Protein g 76   Total lipid (fat) g 79   Carbohydrate, by difference g 231   Fiber, total dietary g 35   Sugars, total g 83   Minerals   Calcium, Ca mg 1488   Iron, Fe mg 16   Sodium, Na mg 1100   Vitamins   Vitamin C, total ascorbic acid mg 149   Vitamin A, IU IU 71903   Vitamin D    Lipids   Fatty acids, total saturated g 15   Fatty acids, total monounsaturated g 32   Fatty acids, total polyunsaturated g 26   Cholesterol mg 28

## 2021-01-01 NOTE — CONSULTS
[facility]  [address], [address]  [facility], [facility]    Pulmonary Consultation      Reason for Consult:  sob    Requesting Physician:  Radames Riedel, MD    CHIEF COMPLAINT:  sob    History Obtained From:  patient    HISTORY OF PRESENT ILLNESS:                The patient is a 63 y.o. male with significant past medical history of CAD who presents with CABG x2 on 12/30. Did well with operation, currently sitting up in chair on 5 li NC, not much problems with cough/congestion, still with some sob though less than when admitted (had been complaining of dyspnea for the past 3 months). He states he has been compliant with CPAP use for the past decade (?) 6 with no oxygen bleed in, denies being on any lung medications or oxygen use at night. Had seen Dr Raleigh Cogan for a few yrs for OCTAVIO but stopped as was stable. Past Medical History:        Diagnosis Date    CAD (coronary artery disease)     Hyperlipidemia     Hypertension     Myocardial infarct [facility])        Past Surgical History:        Procedure Laterality Date    CARDIAC CATHETERIZATION  10/22/2020    Dr. Orozco- EF 50-55%, PTA Distal RCA    COLONOSCOPY      CORONARY ANGIOPLASTY      CORONARY ANGIOPLASTY Right 06/06/2017    Dr. Victoria Benton PTCA-RCA-Rt Wrist    [place] S[place]  10-    Dr. Reema Fernandes  1-    2.75 x 18mm Xience Distal RCA Dr. Darci Silverio Left 12/30/2020    CORONARY ARTERY BYPASS, YULI, POSS.  LEFT RADIAL performed by Gayle Dodson MD at [address] CATH LAB PROCEDURE      KNEE SURGERY Right 04/2016    POLYPECTOMY      TONSILLECTOMY         Current Medications:    Current Facility-Administered Medications: metoprolol tartrate (LOPRESSOR) tablet 25 mg, 25 mg, Oral, BID  acetaminophen (TYLENOL) tablet 1,000 mg, 1,000 mg, Oral, 3 times per day  amiodarone (CORDARONE) tablet 400 mg, 400 mg, Oral, BID  ketorolac (TORADOL) injection 15 mg, 15 mg, Intravenous, Q6H PRN  insulin lispro (HUMALOG) injection vial 0-18 Units, 0-18 Units, Subcutaneous, 4x Daily AC & HS  aspirin EC tablet 81 mg, 81 mg, Oral, Daily  acetaminophen (TYLENOL) tablet 650 mg, 650 mg, Oral, Q4H PRN  bisacodyl (DULCOLAX) EC tablet 5 mg, 5 mg, Oral, Daily  sennosides-docusate sodium (SENOKOT-S) 8.6-50 MG tablet 1 tablet, 1 tablet, Oral, BID  ferrous sulfate (IRON 325) tablet 325 mg, 325 mg, Oral, BID WC  ascorbic acid (VITAMIN C) tablet 500 mg, 500 mg, Oral, BID  folic acid (FOLVITE) tablet 1 mg, 1 mg, Oral, Daily  potassium chloride (KLOR-CON M) extended release tablet 20 mEq, 20 mEq, Oral, PRN  bisacodyl (DULCOLAX) suppository 10 mg, 10 mg, Rectal, Daily PRN  diphenhydrAMINE (BENADRYL) tablet 25 mg, 25 mg, Oral, Q8H PRN  glucose (GLUTOSE) 40 % oral gel 15 g, 15 g, Oral, PRN  dextrose 50 % IV solution, 12.5 g, Intravenous, PRN  glucagon (rDNA) injection 1 mg, 1 mg, Intramuscular, PRN  dextrose 5 % solution, 100 mL/hr, Intravenous, PRN  atorvastatin (LIPITOR) tablet 40 mg, 40 mg, Oral, Daily  citalopram (CELEXA) tablet 20 mg, 20 mg, Oral, Daily  vitamin D (CHOLECALCIFEROL) tablet 5,000 Units, 5,000 Units, Oral, Daily  sodium chloride flush 0.9 % injection 10 mL, 10 mL, Intravenous, PRN  ondansetron (ZOFRAN) injection 4 mg, 4 mg, Intravenous, Q8H PRN  oxyCODONE (ROXICODONE) immediate release tablet 5 mg, 5 mg, Oral, Q4H PRN **OR** oxyCODONE HCl (OXY-IR) immediate release tablet 10 mg, 10 mg, Oral, Q4H PRN  magnesium oxide (MAG-OX) tablet 400 mg, 400 mg, Oral, Daily  mupirocin (BACTROBAN) 2 % ointment, , Nasal, BID  pantoprazole (PROTONIX) tablet 40 mg, 40 mg, Oral, Daily  ipratropium-albuterol (DUONEB) nebulizer solution 1 ampule, 1 ampule, Inhalation, Q4H WA  insulin glargine (LANTUS) injection vial 17 Units, 0.15 Units/kg, Subcutaneous, Nightly  tamsulosin (FLOMAX) capsule 0.4 mg, 0.4 mg, Oral, Daily  ticagrelor (BRILINTA) tablet 90 mg, 90 mg, Oral, BID  mupirocin (BACTROBAN) 2 % ointment, , Topical, BID  doxycycline hyclate (VIBRAMYCIN) capsule 100 mg, 100 mg, Oral, 2 times per day    Allergies:  Patient has no known allergies. Social History:    TOBACCO:   reports that he has never smoked. He quit smokeless tobacco use about 21 years ago. His smokeless tobacco use included snuff. Family History:       Problem Relation Age of Onset    Heart Disease Mother     Heart Disease Father     Heart Disease Brother     Heart Disease Maternal Aunt     Heart Disease Maternal Uncle     Heart Disease Maternal Grandfather        REVIEW OF SYSTEMS:    RESPIRATORY:  sob  Remainder of complete ROS is negative. PHYSICAL EXAM:      Vitals:    BP (!) 140/71   Pulse 76   Temp 98.4 °F (36.9 °C) (Temporal)   Resp 20   Ht 5' 8\" (1.727 m)   Wt 273 lb 9.6 oz (124.1 kg)   SpO2 96%   BMI 41.60 kg/m²     CONSTITUTIONAL:  obese  EYES:  Lids and lashes normal, pupils equal, round and reactive to light, extra ocular muscles intact, sclera clear, conjunctiva normal  ENT:  Normocephalic, without obvious abnormality, atraumatic, sinuses nontender on palpation, external ears without lesions, oral pharynx with moist mucus membranes, tonsils without erythema or exudates, gums normal and good dentition. NECK:  Supple, symmetrical, trachea midline, no adenopathy, thyroid symmetric, not enlarged and no tenderness, skin normal  LUNGS:  Minimal bibasal ronchi, (+) CT  CARDIOVASCULAR:  Normal apical impulse, regular rate and rhythm, normal S1 and S2, no S3 or S4, and no murmur noted  ABDOMEN:  No scars, normal bowel sounds, soft, non-distended, non-tender, no masses palpated, no hepatosplenomegally  MUSCULOSKELETAL:  There is no redness, warmth, or swelling of the joints. Full range of motion noted. NEUROLOGIC:  Awake, alert, oriented to name, place and time. Cranial nerves II-XII are grossly intact.     DATA:    CBC:   Recent Labs 12/30/20  1050 12/31/20  0410 01/01/21  0514   WBC 15.4* 15.3* 12.7*   HGB 14.1 13.8 13.2   HCT 42.2 43.0 39.1   MCV 85.4 88.3 86.7    163 139       BMP:  Recent Labs     12/30/20  1050 12/30/20  1050 12/31/20  0410 12/31/20  1854 01/01/21  0514     --  139  --  132   K 4.3   < > 4.3 4.5 3.9     --  104  --  98   CO2 22  --  21*  --  24   BUN 15  --  15  --  24*   CREATININE 0.9  --  0.8  --  0.9    < > = values in this interval not displayed. ALB:3,BILIDIR:3,BILITOT:3,ALKPHOS:3)@    PT/INR:   Recent Labs     12/30/20  1050   PROTIME 12.4   INR 1.1       ABG:   Recent Labs     12/30/20  1419   PH 7.310*   PO2 103.5*   PCO2 47.7*   HCO3 23.5   BE -3.1*   O2SAT 97.4   METHB 0.3   O2HB 96.3   COHB 0.8   HHB 2.6   THB 13.7     FiO2 : 60 %       Radiology Review:  CXR reviewed with patchy opacity right lower lung field looking similar to previous    IMPRESSION/RECOMMENDATIONS:      CAD s/p CABG x2  OCTAVIO  Hypoxia    1. Cont with incentive spirometer use  2. Cont with duonebs, observe respiratory function and cough  3. Cont with oxygen, taper as tolerated  4. CT and surgical issues as per CTS  5. Can use his own NIPPV machine at night  6. Antibiotics as per ID  7. OOB to chair, PT/OT      Time at the bedside, reviewing labs and radiographs, reviewing notes and consultations, discussing with staff and family was more than 50 minutes. Thanks for letting us see this patient in consultation. Please contact us with any questions. Office [phone] or after hours through [email or web address], [phone].

## 2021-01-01 NOTE — PLAN OF CARE
Problem: Pain:  Goal: Pain level will decrease  Description: Pain level will decrease  1/1/2021 0957 by Nick Rubi RN  Outcome: Met This Shift     Problem:  Activity Intolerance:  Goal: Able to perform prescribed physical activity  Description: Able to perform prescribed physical activity  Outcome: Met This Shift     Problem: Anxiety:  Goal: Level of anxiety will decrease  Description: Level of anxiety will decrease  1/1/2021 0957 by Nick Rubi RN  Outcome: Met This Shift

## 2021-01-02 LAB
ANION GAP SERPL CALCULATED.3IONS-SCNC: 9 MMOL/L (ref 7–16)
BUN BLDV-MCNC: 27 MG/DL (ref 8–23)
CALCIUM SERPL-MCNC: 9 MG/DL (ref 8.6–10.2)
CHLORIDE BLD-SCNC: 99 MMOL/L (ref 98–107)
CO2: 24 MMOL/L (ref 22–29)
CREAT SERPL-MCNC: 0.9 MG/DL (ref 0.7–1.2)
GFR AFRICAN AMERICAN: >60
GFR NON-AFRICAN AMERICAN: >60 ML/MIN/1.73
GLUCOSE BLD-MCNC: 135 MG/DL (ref 74–99)
HCT VFR BLD CALC: 36.2 % (ref 37–54)
HEMOGLOBIN: 11.8 G/DL (ref 12.5–16.5)
MCH RBC QN AUTO: 28.4 PG (ref 26–35)
MCHC RBC AUTO-ENTMCNC: 32.6 % (ref 32–34.5)
MCV RBC AUTO: 87 FL (ref 80–99.9)
METER GLUCOSE: 137 MG/DL (ref 74–99)
METER GLUCOSE: 141 MG/DL (ref 74–99)
METER GLUCOSE: 143 MG/DL (ref 74–99)
METER GLUCOSE: 167 MG/DL (ref 74–99)
PDW BLD-RTO: 14.3 FL (ref 11.5–15)
PLATELET # BLD: 144 E9/L (ref 130–450)
PMV BLD AUTO: 9.9 FL (ref 7–12)
POTASSIUM SERPL-SCNC: 4.4 MMOL/L (ref 3.5–5)
RBC # BLD: 4.16 E12/L (ref 3.8–5.8)
SODIUM BLD-SCNC: 132 MMOL/L (ref 132–146)
WBC # BLD: 8.2 E9/L (ref 4.5–11.5)

## 2021-01-02 PROCEDURE — 36415 COLL VENOUS BLD VENIPUNCTURE: CPT

## 2021-01-02 PROCEDURE — 2580000003 HC RX 258: Performed by: NURSE PRACTITIONER

## 2021-01-02 PROCEDURE — 85027 COMPLETE CBC AUTOMATED: CPT

## 2021-01-02 PROCEDURE — 6370000000 HC RX 637 (ALT 250 FOR IP): Performed by: NURSE PRACTITIONER

## 2021-01-02 PROCEDURE — 2700000000 HC OXYGEN THERAPY PER DAY

## 2021-01-02 PROCEDURE — 94640 AIRWAY INHALATION TREATMENT: CPT

## 2021-01-02 PROCEDURE — 6360000002 HC RX W HCPCS: Performed by: NURSE PRACTITIONER

## 2021-01-02 PROCEDURE — 80048 BASIC METABOLIC PNL TOTAL CA: CPT

## 2021-01-02 PROCEDURE — 99232 SBSQ HOSP IP/OBS MODERATE 35: CPT | Performed by: INTERNAL MEDICINE

## 2021-01-02 PROCEDURE — 6370000000 HC RX 637 (ALT 250 FOR IP): Performed by: PHYSICIAN ASSISTANT

## 2021-01-02 PROCEDURE — 2140000000 HC CCU INTERMEDIATE R&B

## 2021-01-02 PROCEDURE — 82962 GLUCOSE BLOOD TEST: CPT

## 2021-01-02 PROCEDURE — 94669 MECHANICAL CHEST WALL OSCILL: CPT

## 2021-01-02 PROCEDURE — 6370000000 HC RX 637 (ALT 250 FOR IP): Performed by: INTERNAL MEDICINE

## 2021-01-02 PROCEDURE — 93798 PHYS/QHP OP CAR RHAB W/ECG: CPT

## 2021-01-02 PROCEDURE — 6360000002 HC RX W HCPCS: Performed by: PHYSICIAN ASSISTANT

## 2021-01-02 RX ORDER — FUROSEMIDE 10 MG/ML
20 INJECTION INTRAMUSCULAR; INTRAVENOUS ONCE
Status: COMPLETED | OUTPATIENT
Start: 2021-01-02 | End: 2021-01-02

## 2021-01-02 RX ADMIN — MUPIROCIN: 20 OINTMENT TOPICAL at 08:58

## 2021-01-02 RX ADMIN — IPRATROPIUM BROMIDE AND ALBUTEROL SULFATE 1 AMPULE: .5; 3 SOLUTION RESPIRATORY (INHALATION) at 17:05

## 2021-01-02 RX ADMIN — OXYCODONE HYDROCHLORIDE 10 MG: 10 TABLET ORAL at 08:55

## 2021-01-02 RX ADMIN — METOPROLOL TARTRATE 25 MG: 25 TABLET, FILM COATED ORAL at 20:15

## 2021-01-02 RX ADMIN — FERROUS SULFATE TAB 325 MG (65 MG ELEMENTAL FE) 325 MG: 325 (65 FE) TAB at 08:53

## 2021-01-02 RX ADMIN — OXYCODONE HYDROCHLORIDE AND ACETAMINOPHEN 500 MG: 500 TABLET ORAL at 20:15

## 2021-01-02 RX ADMIN — FOLIC ACID 1 MG: 1 TABLET ORAL at 08:56

## 2021-01-02 RX ADMIN — OXYCODONE HYDROCHLORIDE 5 MG: 5 TABLET ORAL at 03:41

## 2021-01-02 RX ADMIN — IPRATROPIUM BROMIDE AND ALBUTEROL SULFATE 1 AMPULE: .5; 3 SOLUTION RESPIRATORY (INHALATION) at 21:33

## 2021-01-02 RX ADMIN — Medication 5000 UNITS: at 08:54

## 2021-01-02 RX ADMIN — ACETAMINOPHEN 1000 MG: 500 TABLET ORAL at 06:01

## 2021-01-02 RX ADMIN — SODIUM CHLORIDE, PRESERVATIVE FREE 10 ML: 5 INJECTION INTRAVENOUS at 06:00

## 2021-01-02 RX ADMIN — MUPIROCIN: 20 OINTMENT TOPICAL at 20:15

## 2021-01-02 RX ADMIN — METOPROLOL TARTRATE 25 MG: 25 TABLET, FILM COATED ORAL at 08:55

## 2021-01-02 RX ADMIN — KETOROLAC TROMETHAMINE 15 MG: 30 INJECTION, SOLUTION INTRAMUSCULAR at 15:42

## 2021-01-02 RX ADMIN — DOCUSATE SODIUM 50 MG AND SENNOSIDES 8.6 MG 1 TABLET: 8.6; 5 TABLET, FILM COATED ORAL at 08:54

## 2021-01-02 RX ADMIN — FUROSEMIDE 20 MG: 10 INJECTION, SOLUTION INTRAMUSCULAR; INTRAVENOUS at 08:57

## 2021-01-02 RX ADMIN — TICAGRELOR 90 MG: 90 TABLET ORAL at 20:16

## 2021-01-02 RX ADMIN — BISACODYL 5 MG: 5 TABLET, COATED ORAL at 08:56

## 2021-01-02 RX ADMIN — OXYCODONE HYDROCHLORIDE 10 MG: 10 TABLET ORAL at 13:39

## 2021-01-02 RX ADMIN — INSULIN LISPRO 3 UNITS: 100 INJECTION, SOLUTION INTRAVENOUS; SUBCUTANEOUS at 20:17

## 2021-01-02 RX ADMIN — MUPIROCIN: 20 OINTMENT TOPICAL at 08:53

## 2021-01-02 RX ADMIN — INSULIN GLARGINE 17 UNITS: 100 INJECTION, SOLUTION SUBCUTANEOUS at 20:16

## 2021-01-02 RX ADMIN — ENOXAPARIN SODIUM 40 MG: 40 INJECTION SUBCUTANEOUS at 08:57

## 2021-01-02 RX ADMIN — TAMSULOSIN HYDROCHLORIDE 0.4 MG: 0.4 CAPSULE ORAL at 08:56

## 2021-01-02 RX ADMIN — FERROUS SULFATE TAB 325 MG (65 MG ELEMENTAL FE) 325 MG: 325 (65 FE) TAB at 17:19

## 2021-01-02 RX ADMIN — MUPIROCIN: 20 OINTMENT TOPICAL at 20:14

## 2021-01-02 RX ADMIN — AMIODARONE HYDROCHLORIDE 400 MG: 200 TABLET ORAL at 20:16

## 2021-01-02 RX ADMIN — INSULIN LISPRO 3 UNITS: 100 INJECTION, SOLUTION INTRAVENOUS; SUBCUTANEOUS at 17:18

## 2021-01-02 RX ADMIN — DOCUSATE SODIUM 50 MG AND SENNOSIDES 8.6 MG 1 TABLET: 8.6; 5 TABLET, FILM COATED ORAL at 20:16

## 2021-01-02 RX ADMIN — DOXYCYCLINE HYCLATE 100 MG: 100 CAPSULE ORAL at 20:15

## 2021-01-02 RX ADMIN — ASPIRIN 81 MG: 81 TABLET, COATED ORAL at 08:54

## 2021-01-02 RX ADMIN — TICAGRELOR 90 MG: 90 TABLET ORAL at 08:55

## 2021-01-02 RX ADMIN — ATORVASTATIN CALCIUM 40 MG: 40 TABLET, FILM COATED ORAL at 08:54

## 2021-01-02 RX ADMIN — PANTOPRAZOLE SODIUM 40 MG: 40 TABLET, DELAYED RELEASE ORAL at 08:55

## 2021-01-02 RX ADMIN — DOXYCYCLINE HYCLATE 100 MG: 100 CAPSULE ORAL at 08:54

## 2021-01-02 RX ADMIN — OXYCODONE HYDROCHLORIDE AND ACETAMINOPHEN 500 MG: 500 TABLET ORAL at 08:56

## 2021-01-02 RX ADMIN — OXYCODONE HYDROCHLORIDE 5 MG: 5 TABLET ORAL at 20:16

## 2021-01-02 RX ADMIN — CITALOPRAM 20 MG: 20 TABLET, FILM COATED ORAL at 08:54

## 2021-01-02 RX ADMIN — KETOROLAC TROMETHAMINE 15 MG: 30 INJECTION, SOLUTION INTRAMUSCULAR at 06:00

## 2021-01-02 RX ADMIN — MAGNESIUM GLUCONATE 500 MG ORAL TABLET 400 MG: 500 TABLET ORAL at 08:54

## 2021-01-02 RX ADMIN — IPRATROPIUM BROMIDE AND ALBUTEROL SULFATE 1 AMPULE: .5; 3 SOLUTION RESPIRATORY (INHALATION) at 12:20

## 2021-01-02 RX ADMIN — AMIODARONE HYDROCHLORIDE 400 MG: 200 TABLET ORAL at 08:56

## 2021-01-02 RX ADMIN — SODIUM CHLORIDE, PRESERVATIVE FREE 10 ML: 5 INJECTION INTRAVENOUS at 08:57

## 2021-01-02 ASSESSMENT — PAIN DESCRIPTION - FREQUENCY: FREQUENCY: INTERMITTENT

## 2021-01-02 ASSESSMENT — PAIN DESCRIPTION - DESCRIPTORS: DESCRIPTORS: ACHING;DISCOMFORT

## 2021-01-02 ASSESSMENT — PAIN SCALES - GENERAL
PAINLEVEL_OUTOF10: 5
PAINLEVEL_OUTOF10: 3
PAINLEVEL_OUTOF10: 5
PAINLEVEL_OUTOF10: 5

## 2021-01-02 ASSESSMENT — PAIN - FUNCTIONAL ASSESSMENT
PAIN_FUNCTIONAL_ASSESSMENT: ACTIVITIES ARE NOT PREVENTED
PAIN_FUNCTIONAL_ASSESSMENT: PREVENTS OR INTERFERES SOME ACTIVE ACTIVITIES AND ADLS

## 2021-01-02 ASSESSMENT — PAIN DESCRIPTION - PROGRESSION: CLINICAL_PROGRESSION: NOT CHANGED

## 2021-01-02 ASSESSMENT — PAIN DESCRIPTION - ORIENTATION
ORIENTATION: MID
ORIENTATION: MID

## 2021-01-02 ASSESSMENT — PAIN DESCRIPTION - PAIN TYPE
TYPE: SURGICAL PAIN
TYPE: ACUTE PAIN;SURGICAL PAIN
TYPE: SURGICAL PAIN;ACUTE PAIN
TYPE: SURGICAL PAIN

## 2021-01-02 ASSESSMENT — PAIN DESCRIPTION - LOCATION
LOCATION: RIB CAGE;STERNUM
LOCATION: STERNUM;RIB CAGE
LOCATION: INCISION;STERNUM
LOCATION: INCISION;STERNUM

## 2021-01-02 NOTE — PROGRESS NOTES
POD#3  Awake, alert. Sore from CTs. Denies CP, palpitations, SOB at rest, dizziness/lightheadedness. Vitals:    01/01/21 2139 01/02/21 0025 01/02/21 0330 01/02/21 0647   BP: 137/66 122/60 119/62    Pulse: 83 76 69    Resp: 18 20 18    Temp: 98.9 °F (37.2 °C) 98.1 °F (36.7 °C) 97.1 °F (36.2 °C)    TempSrc:  Temporal Temporal    SpO2:  95% 97%    Weight:    274 lb 9.6 oz (124.6 kg)   Height:         O2: 5 L/NC      Intake/Output Summary (Last 24 hours) at 1/2/2021 0759  Last data filed at 1/2/2021 0647  Gross per 24 hour   Intake 1380 ml   Output 1270 ml   Net 110 ml         Recent Labs     12/31/20  0410 01/01/21  0514 01/02/21  0546   WBC 15.3* 12.7* 8.2   HGB 13.8 13.2 11.8*   HCT 43.0 39.1 36.2*    139 144      Recent Labs     12/31/20  0410 01/01/21  0514 01/02/21  0546   BUN 15 24* 27*   CREATININE 0.8 0.9 0.9       Telemetry: frequent PVCs      PE  Cardiac: RRR  Lungs: decreased bases, audible expiratory wheeze  Chest incision with intact ARMINDA DSD. Sternum stable. Prior chest tube site incisions C/D/I, no erythema with intact sutures. 2 Chest tubes x 2 and Epicardial pacing wires present and secure. Abd: Soft, nontender, +BS  Ext: Rt groin erythem stable  RLE Incisions C/D/I, approximated, no erythema, chronic edema; staples in place           A/P: POD# 3    1. CAD S/p CABG x 2 (LIMA-LAD, SVG-RCA)/SHOAIB exclusion with 40 mm AtriClip  - ASA/Brilinta, Lipitor, metoprolol started   - pleural tubes removed  - amio for afib prophy     2. Acute Postoperative Respiratory Insufficiency/ expiratory wheeze  - 2/2 surgery, extubated DOS to BiPAP  - On BiPAP overnight, now on 5 L O2 NC, sats 94%  - Continue EZPAP q 4 hours, encourage IS, instructed to C&DB, OOBTC with PT/OT, increase activity as tolerated, wean O2 as able for sats >92%  - h/o OCTAVIO with home CPAP use-  -pulmonary following  - will give lasix again today     3. HTN  -on BID metop 25- BP well controlled     4.  Acute Post Operative Pain   - Pain control suboptimal, add PRN toradol for BTP, tylenol ATC, encourage use of PO PRN narcotics for optimal relief     5. DMII  -Hemoglobin A1c 6.1  -SSI , nightly lantus for glycemia control      6. Folliculitis   - ID following, started on mupirocin topically on affected area BID, doxycycline 100 mg BID  -Keep area clean and dry at all times    7.  Frequent PVCs  - cont pacing wires  - on amio 400 BID and metoprolol; monitor closely  - on K and Mg supplements    8.no post op BM  - + flatus; if no BM tomorrow will add suppository     DVT prophy- SCDs- on brilinta    Dispo: home vs. Rehab pending progression in activity level in next couple of days      This patient's case and care plan was discussed with the attending surgeon

## 2021-01-02 NOTE — PLAN OF CARE
Problem: Pain:  Goal: Pain level will decrease  Description: Pain level will decrease  Outcome: Met This Shift     Problem: Discharge Planning:  Goal: Discharged to appropriate level of care  Description: Discharged to appropriate level of care  Outcome: Met This Shift     Problem:  Activity Intolerance:  Goal: Able to perform prescribed physical activity  Description: Able to perform prescribed physical activity  Outcome: Ongoing

## 2021-01-03 LAB
ANION GAP SERPL CALCULATED.3IONS-SCNC: 9 MMOL/L (ref 7–16)
BUN BLDV-MCNC: 27 MG/DL (ref 8–23)
CALCIUM SERPL-MCNC: 8.9 MG/DL (ref 8.6–10.2)
CHLORIDE BLD-SCNC: 101 MMOL/L (ref 98–107)
CO2: 25 MMOL/L (ref 22–29)
CREAT SERPL-MCNC: 1 MG/DL (ref 0.7–1.2)
GFR AFRICAN AMERICAN: >60
GFR NON-AFRICAN AMERICAN: >60 ML/MIN/1.73
GLUCOSE BLD-MCNC: 104 MG/DL (ref 74–99)
HCT VFR BLD CALC: 35 % (ref 37–54)
HEMOGLOBIN: 11.1 G/DL (ref 12.5–16.5)
MCH RBC QN AUTO: 27.8 PG (ref 26–35)
MCHC RBC AUTO-ENTMCNC: 31.7 % (ref 32–34.5)
MCV RBC AUTO: 87.7 FL (ref 80–99.9)
METER GLUCOSE: 108 MG/DL (ref 74–99)
METER GLUCOSE: 116 MG/DL (ref 74–99)
METER GLUCOSE: 153 MG/DL (ref 74–99)
METER GLUCOSE: 153 MG/DL (ref 74–99)
PDW BLD-RTO: 14.3 FL (ref 11.5–15)
PLATELET # BLD: 174 E9/L (ref 130–450)
PMV BLD AUTO: 10 FL (ref 7–12)
POTASSIUM SERPL-SCNC: 4.3 MMOL/L (ref 3.5–5)
RBC # BLD: 3.99 E12/L (ref 3.8–5.8)
SODIUM BLD-SCNC: 135 MMOL/L (ref 132–146)
WBC # BLD: 7.2 E9/L (ref 4.5–11.5)

## 2021-01-03 PROCEDURE — 6360000002 HC RX W HCPCS: Performed by: NURSE PRACTITIONER

## 2021-01-03 PROCEDURE — 6370000000 HC RX 637 (ALT 250 FOR IP): Performed by: NURSE PRACTITIONER

## 2021-01-03 PROCEDURE — 99232 SBSQ HOSP IP/OBS MODERATE 35: CPT | Performed by: INTERNAL MEDICINE

## 2021-01-03 PROCEDURE — 94640 AIRWAY INHALATION TREATMENT: CPT

## 2021-01-03 PROCEDURE — 2580000003 HC RX 258: Performed by: NURSE PRACTITIONER

## 2021-01-03 PROCEDURE — 36415 COLL VENOUS BLD VENIPUNCTURE: CPT

## 2021-01-03 PROCEDURE — 93798 PHYS/QHP OP CAR RHAB W/ECG: CPT

## 2021-01-03 PROCEDURE — 80048 BASIC METABOLIC PNL TOTAL CA: CPT

## 2021-01-03 PROCEDURE — 6370000000 HC RX 637 (ALT 250 FOR IP): Performed by: PHYSICIAN ASSISTANT

## 2021-01-03 PROCEDURE — 6370000000 HC RX 637 (ALT 250 FOR IP): Performed by: INTERNAL MEDICINE

## 2021-01-03 PROCEDURE — 6360000002 HC RX W HCPCS: Performed by: PHYSICIAN ASSISTANT

## 2021-01-03 PROCEDURE — 2700000000 HC OXYGEN THERAPY PER DAY

## 2021-01-03 PROCEDURE — 85027 COMPLETE CBC AUTOMATED: CPT

## 2021-01-03 PROCEDURE — 2140000000 HC CCU INTERMEDIATE R&B

## 2021-01-03 PROCEDURE — 82962 GLUCOSE BLOOD TEST: CPT

## 2021-01-03 RX ORDER — FUROSEMIDE 10 MG/ML
20 INJECTION INTRAMUSCULAR; INTRAVENOUS ONCE
Status: COMPLETED | OUTPATIENT
Start: 2021-01-03 | End: 2021-01-03

## 2021-01-03 RX ADMIN — AMIODARONE HYDROCHLORIDE 400 MG: 200 TABLET ORAL at 09:31

## 2021-01-03 RX ADMIN — KETOROLAC TROMETHAMINE 15 MG: 30 INJECTION, SOLUTION INTRAMUSCULAR at 06:27

## 2021-01-03 RX ADMIN — FOLIC ACID 1 MG: 1 TABLET ORAL at 09:32

## 2021-01-03 RX ADMIN — OXYCODONE HYDROCHLORIDE AND ACETAMINOPHEN 500 MG: 500 TABLET ORAL at 20:42

## 2021-01-03 RX ADMIN — IPRATROPIUM BROMIDE AND ALBUTEROL SULFATE 1 AMPULE: .5; 3 SOLUTION RESPIRATORY (INHALATION) at 12:57

## 2021-01-03 RX ADMIN — MUPIROCIN: 20 OINTMENT TOPICAL at 09:34

## 2021-01-03 RX ADMIN — SODIUM CHLORIDE, PRESERVATIVE FREE 10 ML: 5 INJECTION INTRAVENOUS at 09:33

## 2021-01-03 RX ADMIN — KETOROLAC TROMETHAMINE 15 MG: 30 INJECTION, SOLUTION INTRAMUSCULAR at 00:08

## 2021-01-03 RX ADMIN — METOPROLOL TARTRATE 25 MG: 25 TABLET, FILM COATED ORAL at 20:42

## 2021-01-03 RX ADMIN — MAGNESIUM GLUCONATE 500 MG ORAL TABLET 400 MG: 500 TABLET ORAL at 09:31

## 2021-01-03 RX ADMIN — INSULIN LISPRO 3 UNITS: 100 INJECTION, SOLUTION INTRAVENOUS; SUBCUTANEOUS at 21:08

## 2021-01-03 RX ADMIN — SODIUM CHLORIDE, PRESERVATIVE FREE 10 ML: 5 INJECTION INTRAVENOUS at 00:08

## 2021-01-03 RX ADMIN — FUROSEMIDE 20 MG: 10 INJECTION, SOLUTION INTRAMUSCULAR; INTRAVENOUS at 09:38

## 2021-01-03 RX ADMIN — DOXYCYCLINE HYCLATE 100 MG: 100 CAPSULE ORAL at 20:42

## 2021-01-03 RX ADMIN — TAMSULOSIN HYDROCHLORIDE 0.4 MG: 0.4 CAPSULE ORAL at 09:32

## 2021-01-03 RX ADMIN — INSULIN GLARGINE 17 UNITS: 100 INJECTION, SOLUTION SUBCUTANEOUS at 21:08

## 2021-01-03 RX ADMIN — BISACODYL 5 MG: 5 TABLET, COATED ORAL at 09:31

## 2021-01-03 RX ADMIN — CITALOPRAM 20 MG: 20 TABLET, FILM COATED ORAL at 09:32

## 2021-01-03 RX ADMIN — ASPIRIN 81 MG: 81 TABLET, COATED ORAL at 09:31

## 2021-01-03 RX ADMIN — INSULIN LISPRO 3 UNITS: 100 INJECTION, SOLUTION INTRAVENOUS; SUBCUTANEOUS at 17:00

## 2021-01-03 RX ADMIN — DOXYCYCLINE HYCLATE 100 MG: 100 CAPSULE ORAL at 09:32

## 2021-01-03 RX ADMIN — ATORVASTATIN CALCIUM 40 MG: 40 TABLET, FILM COATED ORAL at 09:32

## 2021-01-03 RX ADMIN — Medication 5000 UNITS: at 09:32

## 2021-01-03 RX ADMIN — IPRATROPIUM BROMIDE AND ALBUTEROL SULFATE 1 AMPULE: .5; 3 SOLUTION RESPIRATORY (INHALATION) at 09:05

## 2021-01-03 RX ADMIN — MUPIROCIN: 20 OINTMENT TOPICAL at 23:29

## 2021-01-03 RX ADMIN — DOCUSATE SODIUM 50 MG AND SENNOSIDES 8.6 MG 1 TABLET: 8.6; 5 TABLET, FILM COATED ORAL at 09:31

## 2021-01-03 RX ADMIN — FERROUS SULFATE TAB 325 MG (65 MG ELEMENTAL FE) 325 MG: 325 (65 FE) TAB at 09:32

## 2021-01-03 RX ADMIN — ACETAMINOPHEN 1000 MG: 500 TABLET ORAL at 13:30

## 2021-01-03 RX ADMIN — MUPIROCIN: 20 OINTMENT TOPICAL at 09:33

## 2021-01-03 RX ADMIN — IPRATROPIUM BROMIDE AND ALBUTEROL SULFATE 1 AMPULE: .5; 3 SOLUTION RESPIRATORY (INHALATION) at 16:58

## 2021-01-03 RX ADMIN — METOPROLOL TARTRATE 25 MG: 25 TABLET, FILM COATED ORAL at 09:32

## 2021-01-03 RX ADMIN — TICAGRELOR 90 MG: 90 TABLET ORAL at 09:32

## 2021-01-03 RX ADMIN — OXYCODONE HYDROCHLORIDE AND ACETAMINOPHEN 500 MG: 500 TABLET ORAL at 09:32

## 2021-01-03 RX ADMIN — AMIODARONE HYDROCHLORIDE 400 MG: 200 TABLET ORAL at 20:42

## 2021-01-03 RX ADMIN — MUPIROCIN: 20 OINTMENT TOPICAL at 23:28

## 2021-01-03 RX ADMIN — IPRATROPIUM BROMIDE AND ALBUTEROL SULFATE 1 AMPULE: .5; 3 SOLUTION RESPIRATORY (INHALATION) at 20:29

## 2021-01-03 RX ADMIN — ENOXAPARIN SODIUM 40 MG: 40 INJECTION SUBCUTANEOUS at 09:33

## 2021-01-03 RX ADMIN — FERROUS SULFATE TAB 325 MG (65 MG ELEMENTAL FE) 325 MG: 325 (65 FE) TAB at 17:01

## 2021-01-03 RX ADMIN — OXYCODONE HYDROCHLORIDE 5 MG: 5 TABLET ORAL at 20:42

## 2021-01-03 RX ADMIN — TICAGRELOR 90 MG: 90 TABLET ORAL at 20:42

## 2021-01-03 RX ADMIN — PANTOPRAZOLE SODIUM 40 MG: 40 TABLET, DELAYED RELEASE ORAL at 09:32

## 2021-01-03 RX ADMIN — OXYCODONE HYDROCHLORIDE 5 MG: 5 TABLET ORAL at 02:06

## 2021-01-03 ASSESSMENT — PAIN SCALES - GENERAL
PAINLEVEL_OUTOF10: 4
PAINLEVEL_OUTOF10: 2
PAINLEVEL_OUTOF10: 5
PAINLEVEL_OUTOF10: 4
PAINLEVEL_OUTOF10: 5
PAINLEVEL_OUTOF10: 3

## 2021-01-03 ASSESSMENT — PAIN DESCRIPTION - ONSET
ONSET: ON-GOING
ONSET: ON-GOING
ONSET: GRADUAL

## 2021-01-03 ASSESSMENT — PAIN DESCRIPTION - LOCATION
LOCATION: STERNUM

## 2021-01-03 ASSESSMENT — PAIN DESCRIPTION - ORIENTATION
ORIENTATION: MID
ORIENTATION: MID

## 2021-01-03 ASSESSMENT — PAIN DESCRIPTION - PAIN TYPE
TYPE: SURGICAL PAIN
TYPE: SURGICAL PAIN

## 2021-01-03 ASSESSMENT — PAIN DESCRIPTION - PROGRESSION: CLINICAL_PROGRESSION: GRADUALLY IMPROVING

## 2021-01-03 ASSESSMENT — PAIN DESCRIPTION - FREQUENCY
FREQUENCY: INTERMITTENT
FREQUENCY: INTERMITTENT

## 2021-01-03 ASSESSMENT — PAIN DESCRIPTION - DESCRIPTORS: DESCRIPTORS: ACHING;DISCOMFORT;DULL

## 2021-01-03 NOTE — PROGRESS NOTES
POD#4  Awake, alert. No complaints. Denies CP, palpitations, SOB at rest, dizziness/lightheadedness. Less wheezy today    Vitals:    01/03/21 0000 01/03/21 0455 01/03/21 0645 01/03/21 0752   BP: 125/63 (!) 110/59  (!) 111/55   Pulse: 77 69  73   Resp: 18 18  16   Temp: 98.7 °F (37.1 °C) 97.6 °F (36.4 °C)  97.1 °F (36.2 °C)   TempSrc: Temporal Temporal  Temporal   SpO2: 96% 96%     Weight:   273 lb 6.4 oz (124 kg)    Height:         O2: 2 L/NC      Intake/Output Summary (Last 24 hours) at 1/3/2021 0826  Last data filed at 1/3/2021 0630  Gross per 24 hour   Intake 1480 ml   Output 1445 ml   Net 35 ml         Recent Labs     01/01/21  0514 01/02/21  0546 01/03/21  0442   WBC 12.7* 8.2 7.2   HGB 13.2 11.8* 11.1*   HCT 39.1 36.2* 35.0*    144 174      Recent Labs     01/01/21  0514 01/02/21  0546 01/03/21  0442   BUN 24* 27* 27*   CREATININE 0.9 0.9 1.0           PE  Cardiac: RRR  Lungs: decreased bases  Chest incision  C/D/I, approximated, no erythema. Sternum stable. Prior chest tube site incisions C/D/I, no erythema with intact sutures. Abd: Soft, nontender, +BS  Ext: Incisions C/D/I, approximated, no erythema, + chronic LE edema, staples in place           A/P: POD# 4      1. CAD S/p CABG x 2 (LIMA-LAD, SVG-RCA)/SHOAIB exclusion with 40 mm AtriClip  - ASA/Brilinta, Lipitor, metoprolol started   -all tubes/wires out  - amio for afib prophy     2. Acute Postoperative Respiratory Insufficiency/ expiratory wheeze  - 2/2 surgery, extubated DOS to BiPAP  - On BiPAP overnight, now on 5 L O2 NC, sats 94%  - Continue EZPAP q 4 hours, encourage IS, instructed to C&DB, OOBTC with PT/OT, increase activity as tolerated, wean O2 as able for sats >92%  - h/o OCTAVIO with home CPAP use-  -pulmonary following  - will give lasix again today     3. HTN  -on BID metop 25- BP well controlled     4.  Acute Post Operative Pain   - Pain control suboptimal, add PRN toradol for BTP, tylenol ATC, encourage use of PO PRN narcotics for optimal relief     5. DMII  -Hemoglobin A1c 6.1  -SSI , nightly lantus for glycemia control      6. Folliculitis   - ID following, started on mupirocin topically on affected area BID, doxycycline 100 mg BID  -Keep area clean and dry at all times     7.  Frequent PVCs- present preop as well  - on amio 400 BID and metoprolol; monitor closely  - on K and Mg supplements     8.no post op BM  - + flatus; if no Bm by later today give supp     DVT prophy- SCDs-lovenox     Dispo: ambulated 400ft on 2 L- ionce can wean off O2 is good for home, likely tomorow         This patient's case and care plan was discussed with the attending surgeon

## 2021-01-03 NOTE — PROGRESS NOTES
input(s): PROTIME, INR in the last 72 hours. ABG:   No results for input(s): PH, PO2, PCO2, HCO3, BE, O2SAT, METHB, O2HB, COHB, O2CON, HHB, THB in the last 72 hours. FiO2 : 60 %       Radiology Review:  CXR reviewed with patchy opacity right lower lung field looking similar to previous    IMPRESSION/RECOMMENDATIONS:      CAD s/p CABG x2  OCTAVIO  Hypoxia  Plan   *- O2 down to 2 L,expect RA in 1-2 days   1. Cont with incentive spirometer use,doing 1 L ,encourged more   2. Cont with duonebs, observe respiratory function and cough  3. Cont with oxygen, taper as tolerated  4. CT and surgical issues as per CTS  5.on CPAP ,advise use Home CPAP  6. Antibiotics as per ID  7.  OOB to chair, PT/OT    VINOD TOLEDO

## 2021-01-04 VITALS
HEIGHT: 68 IN | WEIGHT: 268.8 LBS | OXYGEN SATURATION: 95 % | TEMPERATURE: 97.1 F | DIASTOLIC BLOOD PRESSURE: 62 MMHG | HEART RATE: 66 BPM | RESPIRATION RATE: 18 BRPM | BODY MASS INDEX: 40.74 KG/M2 | SYSTOLIC BLOOD PRESSURE: 111 MMHG

## 2021-01-04 LAB
ANION GAP SERPL CALCULATED.3IONS-SCNC: 11 MMOL/L (ref 7–16)
BUN BLDV-MCNC: 21 MG/DL (ref 8–23)
CALCIUM SERPL-MCNC: 9.3 MG/DL (ref 8.6–10.2)
CHLORIDE BLD-SCNC: 105 MMOL/L (ref 98–107)
CO2: 24 MMOL/L (ref 22–29)
CREAT SERPL-MCNC: 1.1 MG/DL (ref 0.7–1.2)
GFR AFRICAN AMERICAN: >60
GFR NON-AFRICAN AMERICAN: >60 ML/MIN/1.73
GLUCOSE BLD-MCNC: 107 MG/DL (ref 74–99)
HCT VFR BLD CALC: 34.5 % (ref 37–54)
HEMOGLOBIN: 11.3 G/DL (ref 12.5–16.5)
MCH RBC QN AUTO: 28.6 PG (ref 26–35)
MCHC RBC AUTO-ENTMCNC: 32.8 % (ref 32–34.5)
MCV RBC AUTO: 87.3 FL (ref 80–99.9)
METER GLUCOSE: 114 MG/DL (ref 74–99)
METER GLUCOSE: 121 MG/DL (ref 74–99)
PDW BLD-RTO: 14.4 FL (ref 11.5–15)
PLATELET # BLD: 208 E9/L (ref 130–450)
PMV BLD AUTO: 9.8 FL (ref 7–12)
POTASSIUM SERPL-SCNC: 4.4 MMOL/L (ref 3.5–5)
RBC # BLD: 3.95 E12/L (ref 3.8–5.8)
SODIUM BLD-SCNC: 140 MMOL/L (ref 132–146)
WBC # BLD: 6.7 E9/L (ref 4.5–11.5)

## 2021-01-04 PROCEDURE — 6370000000 HC RX 637 (ALT 250 FOR IP): Performed by: INTERNAL MEDICINE

## 2021-01-04 PROCEDURE — 99231 SBSQ HOSP IP/OBS SF/LOW 25: CPT | Performed by: NURSE PRACTITIONER

## 2021-01-04 PROCEDURE — 6360000002 HC RX W HCPCS: Performed by: PHYSICIAN ASSISTANT

## 2021-01-04 PROCEDURE — 6370000000 HC RX 637 (ALT 250 FOR IP): Performed by: PHYSICIAN ASSISTANT

## 2021-01-04 PROCEDURE — 6360000002 HC RX W HCPCS: Performed by: NURSE PRACTITIONER

## 2021-01-04 PROCEDURE — 85027 COMPLETE CBC AUTOMATED: CPT

## 2021-01-04 PROCEDURE — 93798 PHYS/QHP OP CAR RHAB W/ECG: CPT

## 2021-01-04 PROCEDURE — 82962 GLUCOSE BLOOD TEST: CPT

## 2021-01-04 PROCEDURE — 80048 BASIC METABOLIC PNL TOTAL CA: CPT

## 2021-01-04 PROCEDURE — 36415 COLL VENOUS BLD VENIPUNCTURE: CPT

## 2021-01-04 PROCEDURE — 6370000000 HC RX 637 (ALT 250 FOR IP): Performed by: NURSE PRACTITIONER

## 2021-01-04 PROCEDURE — 97530 THERAPEUTIC ACTIVITIES: CPT

## 2021-01-04 RX ORDER — OXYCODONE HYDROCHLORIDE 5 MG/1
5 TABLET ORAL EVERY 6 HOURS PRN
Qty: 20 TABLET | Refills: 0 | Status: SHIPPED | OUTPATIENT
Start: 2021-01-04 | End: 2021-01-09

## 2021-01-04 RX ORDER — AMIODARONE HYDROCHLORIDE 200 MG/1
TABLET ORAL
Qty: 44 TABLET | Refills: 0 | Status: SHIPPED | OUTPATIENT
Start: 2021-01-04 | End: 2021-01-19 | Stop reason: CLARIF

## 2021-01-04 RX ORDER — DOXYCYCLINE HYCLATE 100 MG/1
100 CAPSULE ORAL 2 TIMES DAILY
Qty: 8 CAPSULE | Refills: 0 | Status: SHIPPED | OUTPATIENT
Start: 2021-01-04 | End: 2021-01-08

## 2021-01-04 RX ORDER — FOLIC ACID 1 MG/1
1 TABLET ORAL DAILY
Qty: 30 TABLET | Refills: 0 | Status: SHIPPED | OUTPATIENT
Start: 2021-01-05 | End: 2022-07-28

## 2021-01-04 RX ORDER — POTASSIUM CHLORIDE 750 MG/1
10 TABLET, EXTENDED RELEASE ORAL EVERY OTHER DAY
Qty: 3 TABLET | Refills: 0 | Status: SHIPPED | OUTPATIENT
Start: 2021-01-04 | End: 2021-01-19 | Stop reason: CLARIF

## 2021-01-04 RX ORDER — FUROSEMIDE 10 MG/ML
20 INJECTION INTRAMUSCULAR; INTRAVENOUS ONCE
Status: COMPLETED | OUTPATIENT
Start: 2021-01-04 | End: 2021-01-04

## 2021-01-04 RX ORDER — DOCUSATE SODIUM 100 MG/1
100 CAPSULE, LIQUID FILLED ORAL 2 TIMES DAILY PRN
Qty: 20 CAPSULE | Refills: 0 | Status: SHIPPED | OUTPATIENT
Start: 2021-01-04 | End: 2022-07-28 | Stop reason: CLARIF

## 2021-01-04 RX ORDER — ASCORBIC ACID 500 MG
500 TABLET ORAL 2 TIMES DAILY
Qty: 60 TABLET | Refills: 0 | Status: SHIPPED | OUTPATIENT
Start: 2021-01-04 | End: 2021-02-03

## 2021-01-04 RX ORDER — FUROSEMIDE 20 MG/1
20 TABLET ORAL DAILY
Qty: 7 TABLET | Refills: 0 | Status: SHIPPED | OUTPATIENT
Start: 2021-01-04 | End: 2021-01-19 | Stop reason: CLARIF

## 2021-01-04 RX ORDER — FERROUS SULFATE 325(65) MG
325 TABLET ORAL 2 TIMES DAILY WITH MEALS
Qty: 60 TABLET | Refills: 0 | Status: SHIPPED | OUTPATIENT
Start: 2021-01-04 | End: 2021-02-03

## 2021-01-04 RX ORDER — PANTOPRAZOLE SODIUM 40 MG/1
40 TABLET, DELAYED RELEASE ORAL DAILY
Qty: 14 TABLET | Refills: 0 | Status: SHIPPED | OUTPATIENT
Start: 2021-01-05 | End: 2021-01-19 | Stop reason: CLARIF

## 2021-01-04 RX ADMIN — OXYCODONE HYDROCHLORIDE AND ACETAMINOPHEN 500 MG: 500 TABLET ORAL at 08:28

## 2021-01-04 RX ADMIN — DIPHENHYDRAMINE HYDROCHLORIDE 25 MG: 25 TABLET ORAL at 00:02

## 2021-01-04 RX ADMIN — AMIODARONE HYDROCHLORIDE 400 MG: 200 TABLET ORAL at 08:27

## 2021-01-04 RX ADMIN — METOPROLOL TARTRATE 25 MG: 25 TABLET, FILM COATED ORAL at 08:27

## 2021-01-04 RX ADMIN — MAGNESIUM GLUCONATE 500 MG ORAL TABLET 400 MG: 500 TABLET ORAL at 08:28

## 2021-01-04 RX ADMIN — ACETAMINOPHEN 1000 MG: 500 TABLET ORAL at 13:52

## 2021-01-04 RX ADMIN — CITALOPRAM 20 MG: 20 TABLET, FILM COATED ORAL at 08:28

## 2021-01-04 RX ADMIN — DOXYCYCLINE HYCLATE 100 MG: 100 CAPSULE ORAL at 08:28

## 2021-01-04 RX ADMIN — TICAGRELOR 90 MG: 90 TABLET ORAL at 08:27

## 2021-01-04 RX ADMIN — FOLIC ACID 1 MG: 1 TABLET ORAL at 08:27

## 2021-01-04 RX ADMIN — PANTOPRAZOLE SODIUM 40 MG: 40 TABLET, DELAYED RELEASE ORAL at 08:31

## 2021-01-04 RX ADMIN — ENOXAPARIN SODIUM 40 MG: 40 INJECTION SUBCUTANEOUS at 08:26

## 2021-01-04 RX ADMIN — FERROUS SULFATE TAB 325 MG (65 MG ELEMENTAL FE) 325 MG: 325 (65 FE) TAB at 08:28

## 2021-01-04 RX ADMIN — MUPIROCIN: 20 OINTMENT TOPICAL at 08:28

## 2021-01-04 RX ADMIN — OXYCODONE HYDROCHLORIDE 5 MG: 5 TABLET ORAL at 07:27

## 2021-01-04 RX ADMIN — Medication 5000 UNITS: at 08:27

## 2021-01-04 RX ADMIN — ATORVASTATIN CALCIUM 40 MG: 40 TABLET, FILM COATED ORAL at 08:31

## 2021-01-04 RX ADMIN — ASPIRIN 81 MG: 81 TABLET, COATED ORAL at 08:28

## 2021-01-04 RX ADMIN — KETOROLAC TROMETHAMINE 15 MG: 30 INJECTION, SOLUTION INTRAMUSCULAR at 01:06

## 2021-01-04 RX ADMIN — TAMSULOSIN HYDROCHLORIDE 0.4 MG: 0.4 CAPSULE ORAL at 08:27

## 2021-01-04 RX ADMIN — FUROSEMIDE 20 MG: 10 INJECTION, SOLUTION INTRAMUSCULAR; INTRAVENOUS at 08:41

## 2021-01-04 ASSESSMENT — PAIN SCALES - GENERAL
PAINLEVEL_OUTOF10: 2
PAINLEVEL_OUTOF10: 3

## 2021-01-04 NOTE — PROGRESS NOTES
Physical Therapy  Physical Therapy Treatment Note     Name: Alfonso Ordonez  : 1955  MRN: [id number]    Referring Provider:  Jo Huntley MD    Date of Service: 2021    Evaluating PT:  Alvaro Lopez, PT, DPT [id number]    Room #:  6429/3996-G  Diagnosis:  CAD  Precautions: Falls, Sternal, O2  Procedure/Surgery:   CABG x 2  PMHx/PSHx:  HLD, HTN, MI  Equipment Needs:  None    SUBJECTIVE:    Pt lives with wife and daughter in a 2 story home with 3 stairs to enter and 1 rail. Full flight of steps and 1 rail to bedroom. Pt ambulated with no device and independent PTA. OBJECTIVE:   Initial Evaluation  Date: 20 Treatment  2021 Short Term/ Long Term   Goals   AM-PAC 6 Clicks 64/45 80/37    Was pt agreeable to Eval/treatment? Yes Yes     Does pt have pain? -7/10 surgical and neck pain None     Bed Mobility  Rolling: NT  Supine to sit: ModA x 2 with HOB elevated  Sit to supine: NT  Scooting: MaxA NT Pt sitting in chair  Ethan   Transfers Sit to stand: Ethan  Stand to sit: Ethan  Stand pivot: Ethan no device Sit to stand SBA  Stand to sit SBA  Stand pivot without device with SBA Independent   Ambulation   150 feet with Ethan no device 200 feet without device with SBA >400 feet Independently   Stair negotiation: ascended and descended NT 4 steps with bilateral rails ( for balance only) with CGA >4 steps with 1 rail Mod Independent   ROM BUE:  Defer to OT note  BLE:  WNL     Strength BUE:  Defer to OT note  BLE:  4+/5  Increase by 1/3 MMT grade   Balance Sitting EOB:  Ethan  Dynamic Standing:  Ethan no device  Sitting EOB:  Independent  Dynamic Standing:  Independent       Therapeutic Exercises:     Ankle pumps ( x15)   LAQs ( B LE x15)   Marching ( B LE x15)     Patient education  Pt educated on proper technique with stair negotiation     Patient response to education:   Pt verbalized understanding Pt demonstrated skill Pt requires further education in this area   x x x     ASSESSMENT:    Comments: Nursing cleared pt for physical therapy. Pt sitting in chair upon arrival and agreed to participate in therapy. Pt completed functional mobility as noted above. Pt required standing rest with ambulation due to fatigue. LOB noted with ambulation with min A to correct. Verbally instructed pt on technique with car transfer. Education on energy conservation and pacing activities. Pt remained in chair with call light in reach. Treatment:  Patient practiced and was instructed in the following treatment:     Transfer training -Verbal instruction for technique and safety with transfers.  Gait training- Verbal instruction for  safety and balance during ambulation.  Stair negotiation - verbal instruction for technique and sequencing with stair negotiation to improve safety. Assistance required to complete task. PLAN OF CARE:    Current Treatment Recommendations     [x] Strengthening     [] ROM   [x] Balance Training   [x] Endurance Training   [x] Transfer Training   [x] Gait Training   [x] Stair Training   [] Positioning   [x] Safety and Education Training   [x] Patient/Caregiver Education   [] HEP  [] Other     Frequency of treatments: 2-5x/week x 1-2 weeks.     Time in 0940  Time out  1005    Total Treatment Time  25 minutes    CPT codes:  [] Low Complexity PT evaluation 20109  [] Moderate Complexity PT evaluation 22631  [] High Complexity PT evaluation 30251  [] PT Re-evaluation 80296  [] Gait training 85400 - minutes  [] Manual therapy 50770 - minutes  [x] Therapeutic activities 71499 25- minutes  [] Therapeutic exercises 80140 - minutes  [] Neuromuscular reeducation 93929 - minutes     Rachel Hendricks [id number]

## 2021-01-04 NOTE — CARE COORDINATION
SOCIAL WORK/CASEMANAGEMENT TRANSITION OF CARE [id number] [place]  [facility], [address], [facility], [phone]): luisa rep, sade, notified of discharge today.  Perry Thornton  1/4/2021

## 2021-01-04 NOTE — PROGRESS NOTES
Inpatient Cardiology Progress note     PATIENT IS BEING FOLLOWED FOR: Cardiology follow post CABG     Cristina Alejo is a 63 y. o. male known to Dr Anaid Boo: Denies CP, or SOB. OBJECTIVE: No apparent distress     ROS:  Consist: Denies fevers, chills or night sweats  Heart: Denies chest pain, palpitations, lightheadedness, dizziness or syncope  Lungs: Denies SOB, cough, wheezing, orthopnea or PND  GI: Denies abdominal pain, vomiting or diarrhea    PHYSICAL EXAM:   /62   Pulse 66   Temp 97.1 °F (36.2 °C) (Temporal)   Resp 18   Ht 5' 8\" (1.727 m)   Wt 268 lb 12.8 oz (121.9 kg)   SpO2 95%   BMI 40.87 kg/m²    B/P Range last 24 hours: Systolic (19AWU), NGP:568 , Min:111 , NLM:621    Diastolic (96BNG), JJP:17, Min:59, Max:90    CONST: Well developed,  male who appears older than his stated age. Awake, alert and cooperative. No apparent distress  HEENT:   Head- Normocephalic, atraumatic   Eyes- Conjunctivae pink, anicteric  Throat- Oral mucosa pink and moist  Neck-  No stridor, trachea midline, no jugular venous distention. No carotid bruit  CHEST: Chest symmetrical and non-tender to palpation. No accessory muscle use or intercostal retractions. Sternotomy with ARMINDA dressing. RESPIRATORY:  Lung sounds - clear throughout fields, on NC O2.   CARDIOVASCULAR:     Heart Ausculation- Regular rate and rhythm, no murmur heard. PV: No lower extremity edema. No varicosities. Pedal pulses palpable, no clubbing or cyanosis. BLE CLAUDINE hose on. ABDOMEN: Soft, non-tender to light palpation. Bowel sounds present. No palpable masses no organomegaly; no abdominal bruit  MS: Good muscle strength and tone. No atrophy or abnormal movements. : Deferred  SKIN: Staples to RLE. Warm and dry no statis dermatitis or ulcers   NEURO / PSYCH: Oriented to person, place and time. Speech clear and appropriate. Follows all commands.  Pleasant affect       Intake/Output Summary (Last 24 hours) at 1/4/2021 1324  Last data filed at 1/4/2021 0929  Gross per 24 hour   Intake 1140 ml   Output 2200 ml   Net -1060 ml       Weight:   Wt Readings from Last 3 Encounters:   01/04/21 268 lb 12.8 oz (121.9 kg)   12/23/20 255 lb (115.7 kg)   11/10/20 262 lb (118.8 kg)     Current Inpatient Medications:   enoxaparin  40 mg Subcutaneous Daily    metoprolol tartrate  25 mg Oral BID    acetaminophen  1,000 mg Oral 3 times per day    amiodarone  400 mg Oral BID    insulin lispro  0-18 Units Subcutaneous 4x Daily AC & HS    aspirin  81 mg Oral Daily    bisacodyl  5 mg Oral Daily    sennosides-docusate sodium  1 tablet Oral BID    ferrous sulfate  325 mg Oral BID WC    vitamin C  500 mg Oral BID    folic acid  1 mg Oral Daily    atorvastatin  40 mg Oral Daily    citalopram  20 mg Oral Daily    vitamin D  5,000 Units Oral Daily    magnesium oxide  400 mg Oral Daily    pantoprazole  40 mg Oral Daily    ipratropium-albuterol  1 ampule Inhalation Q4H WA    insulin glargine  0.15 Units/kg Subcutaneous Nightly    tamsulosin  0.4 mg Oral Daily    ticagrelor  90 mg Oral BID    mupirocin   Topical BID    doxycycline hyclate  100 mg Oral 2 times per day       IV Infusions (if any):   dextrose         DIAGNOSTIC/ LABORATORY DATA:  Labs:   CBC:   Recent Labs     01/03/21  0442 01/04/21  0513   WBC 7.2 6.7   HGB 11.1* 11.3*   HCT 35.0* 34.5*    208     BMP:   Recent Labs     01/03/21  0442 01/04/21  0513    140   K 4.3 4.4   CO2 25 24   BUN 27* 21   CREATININE 1.0 1.1   LABGLOM >60 >60   CALCIUM 8.9 9.3     HgA1c:   Lab Results   Component Value Date    LABA1C 6.1 (H) 12/23/2020     FASTING LIPID PANEL:  Lab Results   Component Value Date    CHOL 172 10/08/2010    HDL 26.0 10/08/2010    [address] 93 10/08/2010    TRIG 266 10/08/2010       CXR 1/1/2021: Cardiomegaly. There are no findings of failure or pneumonia. There is no pneumothorax. Minimal atelectasis within the right lung base.  Question of a residual chest tube within the left lung base versus an overlying wire. Telemetry: SR 60's    [facility] 10/22/2020 Dr Mika Schaffer: Physiologically significant long mid LAD stenosis. 70% discrete proximal stenosis in the small third diagonal branch. Severe mid RCA stenosis by IVUS with focal severe in-stent restenosis in distal RCA due to undersized stents, status post PTCA. With 0% residual stenosis. Elevated LVEDP at 28 mmHg. EF 55%. ASSESSMENT:   1. CAD S/p CABG x 2 (LIMA-LAD, SVG-RCA)/SHOAIB exclusion with 40 mm AtriClip 12/30/2020.  2. SR with frequent PVC's    3. HTN  4. HLD  5. T2DM HgbA1c 6.1 on 12/23/2020  6. BMI 40.87  7. Folliculitis R groin    PLAN:  1. Continue current cardiac medications  2. Amiodarone for AF prophylaxis   3. IS encouraged  4. Okay for discharge to home later today from general cardiology standpoint  5. Will follow up as outpatient with Dr Shalom Felix    Discussed with Dr Grady Gudino  Electronically signed by Jozef Arndt.  OCTAVIO Amin on 1/4/2021 at 1:24 PM

## 2021-01-04 NOTE — PROGRESS NOTES
[facility] [address]  [address] [facility], [address]  [facility], [address] [facility]      Pulmonary Progress Note      SUBJECTIVE:  Claims doing ok, currently on RA, possible discharge today. CT off, ambulating and tolerating, not much problem with cough/congestion, had been wearing his NIPPV while here.     OBJECTIVE    Medications    Continuous Infusions:   dextrose         Scheduled Meds:   enoxaparin  40 mg Subcutaneous Daily    metoprolol tartrate  25 mg Oral BID    acetaminophen  1,000 mg Oral 3 times per day    amiodarone  400 mg Oral BID    insulin lispro  0-18 Units Subcutaneous 4x Daily AC & HS    aspirin  81 mg Oral Daily    bisacodyl  5 mg Oral Daily    sennosides-docusate sodium  1 tablet Oral BID    ferrous sulfate  325 mg Oral BID WC    vitamin C  500 mg Oral BID    folic acid  1 mg Oral Daily    atorvastatin  40 mg Oral Daily    citalopram  20 mg Oral Daily    vitamin D  5,000 Units Oral Daily    magnesium oxide  400 mg Oral Daily    pantoprazole  40 mg Oral Daily    ipratropium-albuterol  1 ampule Inhalation Q4H WA    insulin glargine  0.15 Units/kg Subcutaneous Nightly    tamsulosin  0.4 mg Oral Daily    ticagrelor  90 mg Oral BID    mupirocin   Topical BID    doxycycline hyclate  100 mg Oral 2 times per day       PRN Meds:ketorolac, acetaminophen, potassium chloride, bisacodyl, diphenhydrAMINE, glucose, dextrose, glucagon (rDNA), dextrose, sodium chloride flush, ondansetron, oxyCODONE **OR** oxyCODONE    Physical    VITALS:  /62   Pulse 66   Temp 97.1 °F (36.2 °C) (Temporal)   Resp 18   Ht 5' 8\" (1.727 m)   Wt 268 lb 12.8 oz (121.9 kg)   SpO2 95%   BMI 40.87 kg/m²     24HR INTAKE/OUTPUT:      Intake/Output Summary (Last 24 hours) at 2021 1556  Last data filed at 2021 1200  Gross per 24 hour   Intake 1080 ml   Output 1275 ml   Net -195 ml       24HR PULSE OXIMETRY RANGE:    SpO2  Av %  Min: 94 %  Max: 97 %    General appearance: alert, appears stated age and cooperative, obese  Lungs: rhonchi bibasilar minimal  Heart: regular rate and rhythm, S1, S2 normal, no murmur, click, rub or gallop  Abdomen: soft, non-tender; bowel sounds normal; no masses,  no organomegaly  Extremities: extremities normal, atraumatic, no cyanosis or edema  Neurologic: Mental status: Alert, oriented, thought content appropriate    Data    CBC:   Recent Labs     01/02/21  0546 01/03/21 0442 01/04/21 0513   WBC 8.2 7.2 6.7   HGB 11.8* 11.1* 11.3*   HCT 36.2* 35.0* 34.5*   MCV 87.0 87.7 87.3    174 208       BMP:  Recent Labs     01/02/21 0546 01/03/21 0442 01/04/21 0513    135 140   K 4.4 4.3 4.4   CL 99 101 105   CO2 24 25 24   BUN 27* 27* 21   CREATININE 0.9 1.0 1.1    ALB:3,BILIDIR:3,BILITOT:3,ALKPHOS:3)@    PT/INR: No results for input(s): PROTIME, INR in the last 72 hours. ABG:   No results for input(s): PH, PO2, PCO2, HCO3, BE, O2SAT, METHB, O2HB, COHB, O2CON, HHB, THB in the last 72 hours. FiO2 : 60 %       Radiology/Other tests reviewed: none    Assessment:     Active Problems:    CAD in native artery    COVID-19  Resolved Problems:    * No resolved hospital problems. *      Plan:       1. Can be discharged from pulmonary pov  2. Cont with NIPPV use as out-pt  3. Antibiotics as per ID  4. On RA, watch oxygenation  5. Can be discharged with no nebs, cont with incentive spirometer use as out-pt      Time at the bedside, reviewing labs and radiographs, reviewing notes and consultations, discussing with staff and family was more than 35 minutes. Thanks for letting us see this patient in consultation. Please contact us with any questions. Office [phone] or after hours through [facility], x [phone].

## 2021-01-04 NOTE — PROGRESS NOTES
Pulmonary [address] [facility]                             Pulmonary Consult/Progress Note :  Covering Dr Dmitriy Gutierrez       HISTORY OF PRESENT ILLNESS:    ON RA   Doing much better  Denies any fever or chills  Doing 1 L incentive spirometry      PHYSICAL EXAM:      Vitals:    BP (!) 158/90   Pulse 79   Temp 98.9 °F (37.2 °C) (Temporal)   Resp 18   Ht 5' 8\" (1.727 m)   Wt 273 lb 6.4 oz (124 kg)   SpO2 94%   BMI 41.57 kg/m²     CONSTITUTIONAL:  obese  EYES:  Lids and lashes normal, pupils equal, round and reactive to light, extra ocular muscles intact, sclera clear, conjunctiva normal  ENT:  Normocephalic, without obvious abnormality, atraumatic, sinuses nontender on palpation, external ears without lesions, oral pharynx with moist mucus membranes, tonsils without erythema or exudates, gums normal and good dentition. NECK:  Supple, symmetrical, trachea midline, no adenopathy, thyroid symmetric, not enlarged and no tenderness, skin normal  LUNGS:  Minimal bibasal ronchi, (+) CT  CARDIOVASCULAR:  Normal apical impulse, regular rate and rhythm, normal S1 and S2, no S3 or S4, and no murmur noted  ABDOMEN:  No scars, normal bowel sounds, soft, non-distended, non-tender, no masses palpated, no hepatosplenomegally  MUSCULOSKELETAL:  There is no redness, warmth, or swelling of the joints. Full range of motion noted. NEUROLOGIC:  Awake, alert, oriented to name, place and time. Cranial nerves II-XII are grossly intact.     DATA:    CBC:   Recent Labs     01/01/21  0514 01/02/21  0546 01/03/21  0442   WBC 12.7* 8.2 7.2   HGB 13.2 11.8* 11.1*   HCT 39.1 36.2* 35.0*   MCV 86.7 87.0 87.7    144 174       BMP:  Recent Labs     01/01/21  0514 01/02/21  0546 01/03/21  0442    132 135   K 3.9 4.4 4.3   CL 98 99 101   CO2 24 24 25   BUN 24* 27* 27*   CREATININE 0.9 0.9 1.0    ALB:3,BILIDIR:3,BILITOT:3,ALKPHOS:3)@    PT/INR:   No results for input(s): PROTIME, INR in the last 72 hours.    ABG:   No results for input(s): PH, PO2, PCO2, HCO3, BE, O2SAT, METHB, O2HB, COHB, O2CON, HHB, THB in the last 72 hours. FiO2 : 60 %       Radiology Review:  CXR reviewed with patchy opacity right lower lung field looking similar to previous    IMPRESSION/RECOMMENDATIONS:      CAD s/p CABG x2  OCTAVIO  Hypoxia  Plan   *- O2 ,On RA   1. Cont with incentive spirometer use,doing 1 L ,encourged more   2. Cont with duonebs, observe respiratory function and cough  3 CT and surgical issues as per CTS  4.on CPAP ,advise use Home CPAP  5. Antibiotics as per ID  6.  OOB to chair, PT/OT    VINOD TOLEDO

## 2021-01-04 NOTE — DISCHARGE SUMMARY
Physician Discharge Summary     Patient ID:  Fabiano Pulido  [id number]  10 y.o.  1955    Admit date: 12/30/2020    Discharge date and time: No discharge date for patient encounter. Admitting Physician: Meseret Olvera MD     Discharge Physician: Meseret Olvera MD    Admission Diagnoses: CAD in native artery [I25.10]    Discharge Diagnoses: History of PCI, severe multivessel coronary artery disease, morbid obesity with a BMI of 39, hypertension, and Hyperlipidemia, folliculitis     OPERATIONS PERFORMED:  1. Sternotomy. 2.  Coronary artery bypass grafting x2; left internal mammary artery to  the LAD, saphenous vein graft to the right coronary artery. 3.  Left atrial appendage exclusion with a 40-mm AtriClip. 4.  Open greater saphenous vein harvest, right lower extremity. 5.  Rigid internal fixation of sternum using KLS plates x2. Admission Condition: good    Discharged Condition: good    Indication for Admission: This is a 29-year-old man who had a history of PCI and wasadmitted about three months ago, where he was found to have bad in-stent stenosis of his right coronary artery. PTCA was done and his LAD was interrogated and noted to be significant and therefore, he was referred for coronary artery bypass grafting. The patient was described the procedure in full including risks and complications, including but not limited to bleeding, infection, need for reoperation, hemothorax, pneumothorax, stroke, myocardial infarction, death; and the patient agreed to proceed. Hospital Course: The patient was admitted for CABG x2 on 12/30/2020. The surgery was uncomplicated, and the patient was transferred to [facility] in stable condition. During pre-op prep, folliculitis was noticed on the right groin. ID was consulted, and the patient began treatment with PO doxycycline and topical mupirocin. The patient was extubated to Bipap several hours after surgery.  He required cleviprex gtt for HTN overnight, and beta blocker was initiated on POD 1. Chest tubes were removed in normal fashion once drainage slowed. By POD 2, the patient was seen on [facility]. The patient has a history of OCTAVIO and used his home CPAP at night. He required 5L NC several days post-op; pulmonary was consulted. The patient had frequent PVCs pre-op as well as post-op, so he was started on PO amio as well as K and Mg supplements. He was resumed on his home dose of Brilinta due to a history of JACOB. By POD 4, the patient ambulated 400ft with cardiac rehab. By POD 5, the patient tolerated room air and was recovering very well. He was discharged to home. He is to continue treatment for folliculitis until 01/56. Consults: cardiology, pulmonary/intensive care, ID and rehabilitation medicine    Discharge Exam:  Vitals:     01/03/21 1900 01/03/21 2330 01/04/21 0400 01/04/21 0724   BP: (!) 158/90 (!) 168/78 (!) 123/59 130/75   Pulse: 79 73 75 75   Resp: 18 16 16 18   Temp: 98.9 °F (37.2 °C) 98.8 °F (37.1 °C) 97.4 °F (36.3 °C) 98.1 °F (36.7 °C)   TempSrc: Temporal Temporal Temporal Temporal   SpO2: 94% 95% 94% 94%   Weight:     268 lb 12.8 oz (121.9 kg)     Height:                 O2: RA        Intake/Output Summary (Last 24 hours) at 1/4/2021 0826  Last data filed at 1/4/2021 0655      Gross per 24 hour   Intake 1140 ml   Output 2425 ml   Net -1285 ml                     Recent Labs     01/02/21  0546 01/03/21  0442 01/04/21  0513   WBC 8.2 7.2 6.7   HGB 11.8* 11.1* 11.3*   HCT 36.2* 35.0* 34.5*    174 208            Recent Labs     01/02/21  0546 01/03/21  0442 01/04/21  0513   BUN 27* 27* 21   CREATININE 0.9 1.0 1.1         Telemetry: frequent PVCs      PE  Cardiac: RRR  Lungs: decreased bases  Chest incision  C/D/I, approximated, no erythema. Sternum stable. Prior chest tube site incisions C/D/I, no erythema with intact sutures.    Abd: Soft, nontender, +BS  Ext: Incisions C/D/I, approximated, no erythema, + chronic LE edema, staples in place right leg, right groin folliculitis draining bloody fluid          Disposition: home    Patient Instructions:    Little Huertas   [facility] Medication Instructions UOO:[id number]    Printed on:01/04/21 3155   Medication Information                      amiodarone (CORDARONE) 200 MG tablet  Take 400 mg orally twice daily for five days, then take 200 mg orally twice daily for five days, then take 200 mg orally daily for fourteen days, then discontinue. ascorbic acid (VITAMIN C) 500 MG tablet  Take 1 tablet by mouth 2 times daily             aspirin 81 MG tablet  Take 81 mg by mouth daily. atorvastatin (LIPITOR) 40 MG tablet  Take 40 mg by mouth daily. Cholecalciferol (VITAMIN D-3) 5000 UNITS TABS  Take 5,000 Units by mouth daily. citalopram (CELEXA) 20 MG tablet  Take 20 mg by mouth daily. docusate sodium (COLACE) 100 MG capsule  Take 1 capsule by mouth 2 times daily as needed for Constipation (constipation)             doxycycline hyclate (VIBRAMYCIN) 100 MG capsule  Take 1 capsule by mouth 2 times daily for 4 days             ezetimibe (ZETIA) 10 MG tablet  Take 10 mg by mouth nightly             ferrous sulfate (IRON 325) 325 (65 Fe) MG tablet  Take 1 tablet by mouth 2 times daily (with meals)             folic acid (FOLVITE) 1 MG tablet  Take 1 tablet by mouth daily             furosemide (LASIX) 20 MG tablet  Take 1 tablet by mouth daily for 7 days             gabapentin (NEURONTIN) 600 MG tablet  Take 600 mg by mouth 2 times daily. magnesium oxide (MAG-OX) 400 (241.3 Mg) MG TABS tablet  Take 1 tablet by mouth daily for 14 days             metoprolol (LOPRESSOR) 25 MG tablet  Take 25 mg by mouth 2 times daily. mupirocin (BACTROBAN) 2 % ointment  Apply topically 3 times daily. oxyCODONE (ROXICODONE) 5 MG immediate release tablet  Take 1 tablet by mouth every 6 hours as needed for Pain for up to 5 days. Intended supply: 5 days. Take lowest dose possible to manage pain             pantoprazole (PROTONIX) 40 MG tablet  Take 1 tablet by mouth daily for 14 days             potassium chloride (KLOR-CON M) 10 MEQ extended release tablet  Take 1 tablet by mouth every other day for 6 days             ticagrelor (BRILINTA) 90 MG TABS tablet  Take 1 tablet by mouth 2 times daily               Activity: sternal precautions   Diet: cardiac diet  Wound Care: keep wound clean and dry    Follow-up with Dr. Salvatore Hua in the office on 01/26/2020. Smoking cessation education provided prior to discharge    Discussed with patient the benefits of participation in cardiac rehab after discharge once approved safe by the surgeon and that a referral will be made at the follow up appointment. Pt verbalized comprehension.     Signed:  Electronically signed by NOAH Hannon on 1/5/2021 at 10:45 AM

## 2021-01-04 NOTE — PROGRESS NOTES
POD#5 Awake, alert. Complains of back pain and overall soreness but feels good today. Denies CP, palpitations, SOB at rest, dizziness/lightheadedness. Vitals:    01/03/21 1900 01/03/21 2330 01/04/21 0400 01/04/21 0724   BP: (!) 158/90 (!) 168/78 (!) 123/59 130/75   Pulse: 79 73 75 75   Resp: 18 16 16 18   Temp: 98.9 °F (37.2 °C) 98.8 °F (37.1 °C) 97.4 °F (36.3 °C) 98.1 °F (36.7 °C)   TempSrc: Temporal Temporal Temporal Temporal   SpO2: 94% 95% 94% 94%   Weight:   268 lb 12.8 oz (121.9 kg)    Height:         O2: RA      Intake/Output Summary (Last 24 hours) at 1/4/2021 0826  Last data filed at 1/4/2021 0655  Gross per 24 hour   Intake 1140 ml   Output 2425 ml   Net -1285 ml           Recent Labs     01/02/21  0546 01/03/21  0442 01/04/21  0513   WBC 8.2 7.2 6.7   HGB 11.8* 11.1* 11.3*   HCT 36.2* 35.0* 34.5*    174 208      Recent Labs     01/02/21  0546 01/03/21  0442 01/04/21  0513   BUN 27* 27* 21   CREATININE 0.9 1.0 1.1       Telemetry: frequent PVCs     PE  Cardiac: RRR  Lungs: decreased bases  Chest incision  C/D/I, approximated, no erythema. Sternum stable. Prior chest tube site incisions C/D/I, no erythema with intact sutures. Abd: Soft, nontender, +BS  Ext: Incisions C/D/I, approximated, no erythema, + chronic LE edema, staples in place right leg, right groin folliculitis draining bloody fluid                A/P: POD# 5        1. CAD S/p CABG x 2 (LIMA-LAD, SVG-RCA)/SHOAIB exclusion with 40 mm AtriClip  - ASA/Brilinta, Lipitor, metoprolol - Brilinta per attending for hx of JACOB  -all tubes/wires out  -amio for afib prophy  - sternal precautions      2.  Acute Postoperative Respiratory Insufficiency/ expiratory wheeze  - 2/2 surgery, extubated DOS to BiPAP  - Continue EZPAP q 4 hours, encourage IS, instructed to C&DB, OOBTC with PT/OT, increase activity as tolerated, wean O2 as able for sats >92%  - h/o OCTAVIO with home CPAP use  - pulmonary following  - will give lasix again today - weight up   - currently on RA 94%     3. HTN  -on BID metop 25- BP well controlled     4. Acute Post Operative Pain   - Pain control suboptimal, add PRN toradol for BTP, tylenol ATC, encourage use of PO PRN narcotics for optimal relief     5. DMII  -Hemoglobin A1c 6.1  -SSI , nightly lantus for glycemia control      6. Folliculitis   - ID following, started on mupirocin topically on affected area BID, doxycycline 100 mg BID - last dose of doxy tomorrow   -Keep area clean and dry at all times     7.  Frequent PVCs- present preop as well  - on amio 400 BID and metoprolol; monitor closely  - on K and Mg supplements     8.no post op BM  - + BM 1/03 and 1/04     DVT prophy- SCDs-lovenox       Dispo: ambulated 400ft and on RA - home today         This patient's case and care plan was discussed with the attending surgeon

## 2021-01-06 ENCOUNTER — TELEPHONE (OUTPATIENT)
Dept: ADMINISTRATIVE | Age: 66
End: 2021-01-06

## 2021-01-06 NOTE — H&P
Juan Pablo Tello a 63 y. o. male seen in hospital as a consult  10/24/2020.  At that time with a history of PCI x 2 , Hx inferior MI (JACOB RCA 10/10); JACOB distal RCA (1/13); and then subsequent PTCA of ISR. He presented back last month for elective heart cath and poss PTCA  He was loaded with Karmalaurie Benoitt was also found to have LAD disease which was significant and was referred to us for consolation of CABG   He currently denies CP, SOB, JOSE, LE edema, N/V, F/C, orthopnea, PND and syncope.     Past Medical History        Past Medical History:   Diagnosis Date    CAD (coronary artery disease)      Hyperlipidemia      Hypertension      Myocardial infarct [facility])           Past Surgical History         Past Surgical History:   Procedure Laterality Date    CARDIAC CATHETERIZATION   10/22/2020     Dr. Del Quintanilla- EF 50-55%, PTA Distal RCA    COLONOSCOPY        CORONARY ANGIOPLASTY        CORONARY ANGIOPLASTY Right 06/06/2017     Dr. Orion Block PTCA-RCA-Rt Wrist    Khadijah Corley   10-     Dr. Marsha Murray   1-     2.75 x 18mm Xience Distal RCA Dr. Shauna Diaz CATH LAB PROCEDURE        KNEE SURGERY Right 04/2016    POLYPECTOMY        TONSILLECTOMY             Social History            Tobacco Use    Smoking status: Never Smoker    Smokeless tobacco: Former User       Types: Snuff   Substance Use Topics    Alcohol use: Yes       Comment: occassionally    Drug use:  No           Current Medication   No current facility-administered medications for this encounter.      Current Outpatient Medications:     isosorbide mononitrate (IMDUR) 60 MG extended release tablet, Take 1 tablet by mouth daily, Disp: 30 tablet, Rfl: 3    ezetimibe (ZETIA) 10 MG tablet, Take 1 tablet by mouth nightly (Patient not taking: Reported on 11/10/2020), Disp: 30 tablet, Rfl: 3    ticagrelor (BRILINTA) 90 MG TABS tablet, Take 1 tablet by mouth 2 times daily, Disp: 60 tablet, Rfl: 2    gabapentin (NEURONTIN) 600 MG tablet, Take 600 mg by mouth 2 times daily. , Disp: , Rfl:     nitroGLYCERIN (NITROSTAT) 0.4 MG SL tablet, , Disp: , Rfl: 1    citalopram (CELEXA) 20 MG tablet, Take 20 mg by mouth daily. , Disp: , Rfl:     aspirin 81 MG tablet, Take 81 mg by mouth daily. , Disp: , Rfl:     Cholecalciferol (VITAMIN D-3) 5000 UNITS TABS, Take 5,000 Units by mouth daily. , Disp: , Rfl:     atorvastatin (LIPITOR) 40 MG tablet, Take 40 mg by mouth daily. , Disp: , Rfl:     enalapril (VASOTEC) 10 MG tablet, Take 10 mg by mouth daily. , Disp: , Rfl:     metoprolol (LOPRESSOR) 25 MG tablet, Take 25 mg by mouth 2 times daily. , Disp: , Rfl:         No Known Allergies        Review of Systems   Constitutional: Negative for chills, fatigue and fever. Respiratory: Negative for cough and shortness of breath.    Cardiovascular: Negative for chest pain, palpitations and leg swelling. Neurological: Negative for syncope and light-headedness.         Physical Exam  Constitutional:       Appearance: Normal appearance. Neck:      Musculoskeletal: Normal range of motion and neck supple. Cardiovascular:      Rate and Rhythm: Normal rate and regular rhythm.      Pulses: Normal pulses. Pulmonary:      Effort: Pulmonary effort is normal.      Breath sounds: Normal breath sounds. Abdominal:      General: Bowel sounds are normal.   Musculoskeletal: Normal range of motion.         General: No swelling.    Neurological:      Mental Status: He is alert and oriented to person, place, and time.          Assessment:      CAD, S/P PTCA and PCI in the past                Plan:       PAT on 12/22 with OR 12/30 at 8am.  CABG +/- radial graft   All risks, benefits, alternatives and potential complications explained thoroughly including, but not limited to, bleeding, infection, lung injury, kidney injury, stroke, heart attack, prolonged ventilation, wound complication, need for re-operation, and death, and the patient agrees to proceed. Stop Brilinta 12/22.

## 2021-01-06 NOTE — ADT AUTH CERT
Coronary Artery Bypass Graft (CABG) - Care Day 5 (1/3/2021) by Jeannette Guerrero RN       Review Status Review Entered   Completed 1/6/2021 15:45      Criteria Review      Care Day: 5 Care Date: 1/3/2021 Level of Care:    Guideline Day 2    Clinical Status    (X) * Procedure completed    1/6/2021 3:45 PM EST by Aaron Neumann      12/30 DONE    (X) * Hemodynamic stability    1/6/2021 3:45 PM EST by Aaron Neumann      T 98.9; P 79; RR 18; /90; PULSE OX 94 % RA    ( ) * No evidence of myocardial ischemia    ( ) * Mechanical ventilation absent    Activity    ( ) * Up to chair    Routes    ( ) * Clear liquid diet, advance as tolerated    Medications    (X) Aspirin    1/6/2021 3:45 PM EST by Aaron Neumann      ASPIRIN    (X) Statin    1/6/2021 3:45 PM EST by Aaron Neumann      LIPITOR    * Milestone   Additional Notes   1/3/2021   MEDICATIONS: TYLENOL 3 TIMES PER DAY (NOT GIVEN X1, GIVEN X1, HELD X1), AMIODARONE 2 TIMES DAILY, VITAMIN C 2 TIME DAILY, ASPIRIN DAILY, LIPITOR PO DAILY, DULCOLAX PO DAILY, CELEXA PO DAILY, VIBRAMYCIN PO 2 TIMES PER DAY, LOVENOX SC DAILY, FERROUS SULFATE 2 TIMES DAILY WITH MEALS, FOLIC ACID PO DAILY, LASIX IV X1, LANTUS SC NIGHTLY, HUMALOG SS 4 TIMES DAILY BEFORE MEALS AND NIGHTLY, DUONEB Q4H WA, MAG OX PO DAILY, LOPRESSOR 2 TIMES DAILY, BACTROBAN TOP 2 TIMES DAILY, PROTONIX PO DAILY, SENOKOT-S 2 TIMES DAILY (GIVEN X1, HELD X1), FLOMAX PO DAILY, BRILINTA PO 2 TIMES DAILY, VITAMIN D PO DAILY, TORADOL IV PRN 15 MG X2, ROXICODONE 5 MG PRN X2      LABS:   1/3/2021 04:42   BUN: 27 (H)   Glucose: 104 (H)   Hemoglobin Quant: 11.1 (L)   Hematocrit: 35.0 (L)      1/3/2021 06:18   Meter Glucose: 108 (H)      1/3/2021 11:11   Meter Glucose: 116 (H)      1/3/2021 16:09   Meter Glucose: 153 (H)      1/3/2021 20:49   Meter Glucose: 153 (H)         * * THORACIC SURGERY * *    A/P: POD# 4           1. CAD S/p CABG x 2 (LIMA-LAD, SVG-RCA)/SHOAIB exclusion with 40 mm AtriClip   - ASA/Brilinta, Lipitor, metoprolol started    -all tubes/wires out   - amio for afib prophy       2. Acute Postoperative Respiratory Insufficiency/ expiratory wheeze   - 2/2 surgery, extubated DOS to BiPAP   - On BiPAP overnight, now on 5 L O2 NC, sats 94%   - Continue EZPAP q 4 hours, encourage IS, instructed to C&DB, OOBTC with PT/OT, increase activity as tolerated, wean O2 as able for sats >92%   - h/o OCTAVIO with home CPAP use-   -pulmonary following   - will give lasix again today       3. HTN   -on BID metop 25- BP well controlled       4. Acute Post Operative Pain    - Pain control suboptimal, add PRN toradol for BTP, tylenol ATC, encourage use of PO PRN narcotics for optimal relief       5. DMII   -Hemoglobin A1c 6.1   -SSI , nightly lantus for glycemia control        6. Folliculitis    - ID following, started on mupirocin topically on affected area BID, doxycycline 100 mg BID   -Keep area clean and dry at all times       7. Frequent PVCs- present preop as well   - on amio 400 BID and metoprolol; monitor closely   - on K and Mg supplements       8.no post op BM   - + flatus; if no Bm by later today give supp       * * PULMONOLOGY * *    IMPRESSION/RECOMMENDATIONS:         CAD s/p CABG x2   OCTAVIO   Hypoxia   Plan    *- O2 ,On RA    1. Cont with incentive spirometer use,doing 1 L ,encourged more    2. Cont with duonebs, observe respiratory function and cough   3 CT and surgical issues as per CTS   4.on CPAP ,advise use Home CPAP   5. Antibiotics as per ID   6.  OOB to chair, PT/OT      Coronary Artery Bypass Graft (CABG) - Care Day 3 (1/1/2021) by Stephanie Martinez, RN       Review Status Review Entered   Completed 1/6/2021 15:39      Criteria Review      Care Day: 3 Care Date: 1/1/2021 Level of Care:    Guideline Day 2    Clinical Status    (X) * Procedure completed    1/6/2021 3:39 PM EST by Joan Newsome      12/30/2021 PROCEDURE DONE    (X) * Hemodynamic stability    1/6/2021 3:39 PM EST by Joan Newsome      T 98.1; P 72; RR 18; /51; PULSE OX 98 % 5 L NC    ( ) * No evidence of myocardial ischemia    ( ) * Mechanical ventilation absent    Activity    ( ) * Up to chair    Routes    ( ) * Clear liquid diet, advance as tolerated    Medications    (X) Aspirin    1/6/2021 3:39 PM EST by Benitez Daigle      ASPIRIN    (X) Statin    1/6/2021 3:39 PM EST by Benitez Daigle      LIPITOR    * Milestone   Additional Notes   1/1/2021   MEDICATIONS: TYLENOL 3 TIMES PER DAY (GIVEN X2, NOT GIVEN X1), AMIODARONE PO 2 TIMES DAILY, VITAMIN C PO 2 TIMES DAILY, ASPIRIN PO DAILY, LIPITOR PO DAILY, DULCOLAX PO DAILY, ANCEF IV Q8H, CELEXA PO DAILY, VIBRAMYCIN PO 2 TIME PER DAY, FERROUS SULFATE 2 TIMES DAILY WITH MEALS, FOLIC ACID PO DAILY, LASIX IV X1, LANTUS SC NIGHTLY, HUMALOG SS 4 TIMES DAILY BEFORE MEALS AND NIGHTLY, DUONEB Q4H WA, MAG OX PO DAILY, LOIPRESSOR 2 TIMES DAILY, BACTROBAN TOP 2 TIMES DAILY, PROTONIX PO DAILY, SENOKOT PO 2 TIMES DAILY, FLOMAX PO DAILY, BRILINTA PO 2 TIMES DAILY, VITAMIN D PO DAILY, TORADOL IV 15 MG PRN X3, ROXICODONE 5 MG PRN X2, OXY-IR 10 MG PRN X2, KLOR CON M 20 MEQ PRN X1      LABS:   1/1/2021 05:14   BUN: 24 (H)   Glucose: 148 (H)   WBC: 12.7 (H)      1/1/2021 06:40   Meter Glucose: 166 (H)      1/1/2021 10:47   Meter Glucose: 138 (H)      1/1/2021 17:51   Meter Glucose: 160 (H)      1/1/2021 22:04   Meter Glucose: 128 (H)      CHEST XRAY:Cardiomegaly. Ron Kilts are no findings of failure or pneumonia   There is no pneumothorax   Minimal atelectasis within the right lung base. Question of a residual chest tube within the left lung base versus an overlying wire. * * CARDIOTHORACIC SURGERY * *    A/P: POD# 2         1. CAD S/p CABG x 2 (LIMA-LAD, SVG-RCA)/SHOAIB exclusion with 40 mm AtriClip   - ASA/Brilinta, Lipitor, metoprolol started    - MS chest tubes removed; cont pleural tubes to bulb       2.  Acute Postoperative Respiratory Insufficiency   - 2/2 surgery, extubated DOS to BiPAP   - On BiPAP overnight, now on 5 L O2 NC, sats 94%   - Continue EZPAP q 4 hours, encourage IS, instructed to C&DB, OOBTC with PT/OT, increase activity as tolerated, wean O2 as able for sats >92%   - h/o OCTAVIO with home CPAP use-- okay to use home unit on transfer, hospital unit if needed for supplemental    -per dr Pappas An consult pulm   - will give lasix       3. HTN   -On Cleviprex gtt overnight, started  metoprolol 12.5mg BID in cvic, increase to 25mg       4. Acute Post Operative Pain    - Pain control suboptimal, add PRN toradol for BTP, tylenol ATC, encourage use of PO PRN narcotics for optimal relief       5. DMII   -Hemoglobin A1c 6.1   -SSI , nightly lantus for glycemia control        6. Folliculitis    - ID following, started on mupirocin topically on affected area BID, doxycycline 100 mg BID   -Keep area clean and dry at all times       * * INFECTIOUS DISEASE * *    Assessment:   Folliculitis, right inguinal area       Recommendations:   Apply mupirocin topically on affected area twice daily.     Continue doxycycline 100 mg twice daily for 7 days from 12/30-01/05. Keep area clean and dry at all times.       * * PULMONOLOGY * *   IMPRESSION/RECOMMENDATIONS:         CAD s/p CABG x2   OCTAVIO   Hypoxia       1. Cont with incentive spirometer use   2. Cont with duonebs, observe respiratory function and cough   3. Cont with oxygen, taper as tolerated   4. CT and surgical issues as per CTS   5. Can use his own NIPPV machine at night   6. Antibiotics as per ID   7.  OOB to chair, PT/OT

## 2021-01-06 NOTE — TELEPHONE ENCOUNTER
Pt called to make HOSP f/u with Dr. Antonio Bateman. Pt was admitted to [facility] for open heart surgery and dc'd on 1/4/21. Pt was advised to f/u with Dr. Rosina Diaz within 1 wk.

## 2021-01-07 LAB
BLOOD BANK DISPENSE STATUS: NORMAL
BLOOD BANK PRODUCT CODE: NORMAL
BPU ID: NORMAL
DESCRIPTION BLOOD BANK: NORMAL

## 2021-01-18 ENCOUNTER — HOSPITAL ENCOUNTER (OUTPATIENT)
Dept: CARDIAC REHAB | Age: 66
Setting detail: THERAPIES SERIES
Discharge: HOME | End: 2021-01-18
Payer: COMMERCIAL

## 2021-01-19 ENCOUNTER — OFFICE VISIT (OUTPATIENT)
Dept: CARDIOLOGY CLINIC | Age: 66
End: 2021-01-19
Payer: COMMERCIAL

## 2021-01-19 VITALS
BODY MASS INDEX: 38.97 KG/M2 | OXYGEN SATURATION: 96 % | RESPIRATION RATE: 18 BRPM | SYSTOLIC BLOOD PRESSURE: 124 MMHG | HEART RATE: 57 BPM | HEIGHT: 69 IN | WEIGHT: 263.1 LBS | DIASTOLIC BLOOD PRESSURE: 84 MMHG

## 2021-01-19 DIAGNOSIS — I97.89 POSTOPERATIVE ATRIAL FIBRILLATION (HCC): ICD-10-CM

## 2021-01-19 DIAGNOSIS — I10 ESSENTIAL HYPERTENSION: ICD-10-CM

## 2021-01-19 DIAGNOSIS — I25.10 CORONARY ARTERY DISEASE INVOLVING NATIVE CORONARY ARTERY OF NATIVE HEART WITHOUT ANGINA PECTORIS: ICD-10-CM

## 2021-01-19 DIAGNOSIS — I25.2 OLD MI (MYOCARDIAL INFARCTION): ICD-10-CM

## 2021-01-19 DIAGNOSIS — I48.91 POSTOPERATIVE ATRIAL FIBRILLATION (HCC): ICD-10-CM

## 2021-01-19 DIAGNOSIS — E78.00 PURE HYPERCHOLESTEROLEMIA: ICD-10-CM

## 2021-01-19 DIAGNOSIS — Z95.1 HX OF CABG: Primary | ICD-10-CM

## 2021-01-19 PROCEDURE — 93000 ELECTROCARDIOGRAM COMPLETE: CPT | Performed by: INTERNAL MEDICINE

## 2021-01-19 PROCEDURE — 99214 OFFICE O/P EST MOD 30 MIN: CPT | Performed by: INTERNAL MEDICINE

## 2021-01-19 RX ORDER — MAGNESIUM 30 MG
30 TABLET ORAL 2 TIMES DAILY
COMMUNITY
End: 2022-07-28 | Stop reason: CLARIF

## 2021-01-19 RX ORDER — LISINOPRIL 10 MG/1
10 TABLET ORAL DAILY
COMMUNITY
End: 2022-07-28 | Stop reason: CLARIF

## 2021-01-19 RX ORDER — NITROGLYCERIN 0.4 MG/1
0.4 TABLET SUBLINGUAL EVERY 5 MIN PRN
COMMUNITY
End: 2022-07-28 | Stop reason: CLARIF

## 2021-01-19 RX ORDER — CYANOCOBALAMIN (VITAMIN B-12) 500 MCG
5000 TABLET ORAL
COMMUNITY
End: 2022-07-28 | Stop reason: CLARIF

## 2021-01-19 RX ORDER — SILDENAFIL 100 MG/1
100 TABLET, FILM COATED ORAL PRN
COMMUNITY
End: 2022-07-28 | Stop reason: CLARIF

## 2021-01-19 RX ORDER — CYCLOBENZAPRINE HCL 5 MG
5 TABLET ORAL 3 TIMES DAILY PRN
COMMUNITY
End: 2022-07-28 | Stop reason: CLARIF

## 2021-01-19 RX ORDER — ENALAPRIL MALEATE 10 MG/1
10 TABLET ORAL DAILY
COMMUNITY
End: 2022-07-28 | Stop reason: CLARIF

## 2021-01-19 RX ORDER — CLOPIDOGREL BISULFATE 75 MG/1
75 TABLET ORAL DAILY
COMMUNITY
End: 2022-07-28 | Stop reason: CLARIF

## 2021-01-19 RX ORDER — ISOSORBIDE MONONITRATE 60 MG/1
60 TABLET, EXTENDED RELEASE ORAL DAILY
COMMUNITY
End: 2022-07-28 | Stop reason: CLARIF

## 2021-01-19 RX ORDER — OMEPRAZOLE 20 MG/1
20 CAPSULE, DELAYED RELEASE ORAL DAILY
COMMUNITY
End: 2022-07-28 | Stop reason: CLARIF

## 2021-01-19 NOTE — PROGRESS NOTES
Patient Active Problem List   Diagnosis    Hypertension    Hyperlipidemia    Chest pain    Pseudoaneurysm ([address]    Stented coronary artery    Old MI (myocardial infarction)    Hx of myocardial infarction    CAD in native artery    Coronary syndrome, acute (HCC)    COVID-19       Current Outpatient Medications   Medication Sig Dispense Refill    omeprazole (PRILOSEC) 20 MG delayed release capsule Take 20 mg by mouth daily      sildenafil (VIAGRA) 100 MG tablet Take 100 mg by mouth as needed for Erectile Dysfunction      nitroGLYCERIN (NITROSTAT) 0.4 MG SL tablet Place 0.4 mg under the tongue every 5 minutes as needed for Chest pain up to max of 3 total doses. If no relief after 1 dose, call 911.  enalapril (VASOTEC) 10 MG tablet Take 10 mg by mouth daily      isosorbide mononitrate (IMDUR) 60 MG extended release tablet Take 60 mg by mouth daily      Cyanocobalamin (VITAMIN B 12) 500 MCG TABS Take 5,000 mcg by mouth      magnesium 30 MG tablet Take 30 mg by mouth 2 times daily      cyclobenzaprine (FLEXERIL) 5 MG tablet Take 5 mg by mouth 3 times daily as needed for Muscle spasms      clopidogrel (PLAVIX) 75 MG tablet Take 75 mg by mouth daily      lisinopril (PRINIVIL;ZESTRIL) 10 MG tablet Take 10 mg by mouth daily      ferrous sulfate (IRON 325) 325 (65 Fe) MG tablet Take 1 tablet by mouth 2 times daily (with meals) 60 tablet 0    folic acid (FOLVITE) 1 MG tablet Take 1 tablet by mouth daily 30 tablet 0    docusate sodium (COLACE) 100 MG capsule Take 1 capsule by mouth 2 times daily as needed for Constipation (constipation) 20 capsule 0    ascorbic acid (VITAMIN C) 500 MG tablet Take 1 tablet by mouth 2 times daily 60 tablet 0    ezetimibe (ZETIA) 10 MG tablet Take 10 mg by mouth nightly      gabapentin (NEURONTIN) 600 MG tablet Take 600 mg by mouth 2 times daily.  citalopram (CELEXA) 20 MG tablet Take 20 mg by mouth daily.  aspirin 81 MG tablet Take 81 mg by mouth daily.  Cholecalciferol (VITAMIN D-3) 5000 UNITS TABS Take 5,000 Units by mouth daily.  atorvastatin (LIPITOR) 40 MG tablet Take 40 mg by mouth daily.  metoprolol (LOPRESSOR) 25 MG tablet Take 25 mg by mouth 2 times daily. No current facility-administered medications for this visit. CC:    Patient is seen post CABG and in follow up for:  1. Hx of CABG    2. Coronary artery disease involving native coronary artery of native heart without angina pectoris    3. Old MI (myocardial infarction)    4. Essential hypertension    5. Pure hypercholesterolemia    6. Postoperative atrial fibrillation (HCC)        HPI:  Seen post recent CABG. Doing well with no specific problems. Denies any shortness of breath or chest pain. Tolerating current medications well without side effects.     ROS:   General: No unusual weight gain,  change in exercise tolerance  Skin: No rash or itching  EENT: No vision changes or nosebleeds  Cardiovascular: No orthopnea or paroxysmal nocturnal dyspnea  Respiratory: No cough or hemoptysis  Gastrointestinal: No hematemesis or recent changes in bowel habits  Genitourinary: No hematuria, urgency or frequency  Musculoskeletal: No muscular weakness or joint swelling   Neurologic / Psychiatric: No incoordination or convulsions  Allergic / Immunologic/ Lymphatic / Endocrine: No anemia or bleeding tendency    Social History     Socioeconomic History    Marital status:      Spouse name: Not on file    Number of children: Not on file    Years of education: Not on file    Highest education level: Not on file   Occupational History    Not on file   Social Needs    Financial resource strain: Not on file    Food insecurity     Worry: Not on file     Inability: Not on file    Transportation needs     Medical: Not on file     Non-medical: Not on file   Tobacco Use    Smoking status: Never Smoker    Smokeless tobacco: Former User     Types: Snuff   Substance and Sexual Activity  Alcohol use: Yes     Comment: occassionally    Drug use: No    Sexual activity: Not on file   Lifestyle    Physical activity     Days per week: Not on file     Minutes per session: Not on file    Stress: Not on file   Relationships    Social connections     Talks on phone: Not on file     Gets together: Not on file     Attends Mandaen service: Not on file     Active member of club or organization: Not on file     Attends meetings of clubs or organizations: Not on file     Relationship status: Not on file    Intimate partner violence     Fear of current or ex partner: Not on file     Emotionally abused: Not on file     Physically abused: Not on file     Forced sexual activity: Not on file   Other Topics Concern    Not on file   Social History Narrative    Not on file       Family History   Problem Relation Age of Onset    Heart Disease Mother     Heart Disease Father     Heart Disease Brother     Heart Disease Maternal Aunt     Heart Disease Maternal Uncle     Heart Disease Maternal Grandfather        Past Medical History:   Diagnosis Date    CAD (coronary artery disease)     Hyperlipidemia     Hypertension     Myocardial infar[facility] ([facility] [address]        PHYSICAL EXAM:  CONSTITUTIONAL:  Well developed, well nourished    Vitals:    01/19/21 1037   BP: 124/84   Site: Right Upper Arm   Position: Sitting   Cuff Size: Large Adult   Pulse: 57   Resp: 18   SpO2: 96%   Weight: 263 lb 1.6 oz (119.3 kg)   Height: 5' 9\" (1.753 m)     HEAD & FACE: Normocephalic. Symmetric. EYES: No xanthelasma. Conjunctivae not injected. EARS, NOSE, MOUTH & THROAT: Good dentition. No oral pallor or cyanosis. NECK: No JVD at 30 degrees. No thyromegaly. RESPIRATORY: Clear to auscultation and percussion in all fields. No use of accessory muscle or intercostal retractions. CARDIOVASCULAR: Regular rate and rhythm. No lifts or thrills on palpitation. Auscultation with normal S1-S2 in intensity and splitting.   No carotid bruits. Abdominal aorta not enlarged. Femoral arteries without bruits. Pedal pulses 2+. No edema. ABDOMEN: Soft without hepatic or splenic enlargement. No tenderness. MUSCULOSKELETAL: No kyphosis or scoliosis of the back. Good muscle strength and tone. No muscle atrophy. Normal gait and ability to undergo exercise stress testing. EXTREMITIES: No clubbing or cyanosis. SKIN: No Xanthomas or ulcerations. NEUROLOGIC: Oriented to time, place and person. Normal mood and affect. LYMPHATIC:  No palpable neck or supraclavicular nodes. No splenomegaly. EKG: the EKG tracing was reviewed and found to reveal: Normal sinus rhythm. Nonspecific ST-T wave changes    ASSESSMENT:                                                     ORDERS:       Diagnosis Orders   1. Hx of CABG  EKG 12 lead   2. Coronary artery disease involving native coronary artery of native heart without angina pectoris  EKG 12 lead   3. Old MI (myocardial infarction)  EKG 12 lead   4. Essential hypertension  EKG 12 lead   5. Pure hypercholesterolemia  EKG 12 lead   6. Postoperative atrial fibrillation (HCC)  EKG 12 lead         PLAN:   See above orders. Medication reconciliation completed. Old records were reviewed and found to reveal: S/P CABGx2  Discontinue Brilinta as patient is currently on Plavix.   Discontinue Lasix and potassium    Follow-up office visit: 6 months

## 2021-01-26 ENCOUNTER — OFFICE VISIT (OUTPATIENT)
Dept: CARDIOTHORACIC SURGERY | Age: 66
End: 2021-01-26

## 2021-01-26 VITALS
HEIGHT: 69 IN | SYSTOLIC BLOOD PRESSURE: 134 MMHG | DIASTOLIC BLOOD PRESSURE: 64 MMHG | BODY MASS INDEX: 39.1 KG/M2 | HEART RATE: 61 BPM | WEIGHT: 264 LBS

## 2021-01-26 DIAGNOSIS — Z95.1 S/P CABG (CORONARY ARTERY BYPASS GRAFT): Primary | ICD-10-CM

## 2021-01-26 PROCEDURE — 99024 POSTOP FOLLOW-UP VISIT: CPT | Performed by: NURSE PRACTITIONER

## 2021-01-26 NOTE — PROGRESS NOTES
Chief Complaint   Patient presents with    Post-Op Check     cabgx2          HPI:  Mr. Lucio Delgadillo is a 29-year-old male whom presents to the office today for routine post-operative follow-up status post: Sternotomy/CABG x 2 (LIMA-LAD, SVG-RCA)/SHOAIB exclusion with 40 mm AtriClip/Open RLE GSV harvest/Rigid internal fixation of the sternum with KLS plates x 2 on 84/54/1547. Currently, there are no complaints of any CP, SOB, major concerns, or incisional issues. Review of Systems:   Constitutional: Negative for fever and chills. Respiratory: Negative for cough, chest tightness and shortness of breath. Cardiovascular: Negative for chest pain, palpitations and leg swelling. Gastrointestinal: Negative for nausea, diarrhea and constipation. Musculoskeletal: Negative for back pain. Skin: Negative for color change. Neurological: Negative for dizziness, syncope and light-headedness. Objective:   Physical Exam   Constitutional: oriented to person, place, and time. No distress. Cardiovascular: Normal rate. Pulmonary/Chest: Effort normal. No respiratory distress. midsternal and chest tube incisions well healed without evidence of infection. Sternum stable. Abdominal: Soft. Bowel sounds are normal.   Musculoskeletal: Normal range of motion. Exhibits no edema. Neurological: alert and oriented to person, place, and time. Skin: Skin is warm and dry. No erythema. Vein harvest incisions intact without evidence of infection   Psychiatric: normal mood and affect. Assessment:      S/P CABG        Plan:      Remove sternal precautions in 2 weeks  Cardiac rehab referral  Continue follow up with PCP, Cardiology as scheduled. Encouraged to call office with any questions, concerns. Otherwise no further follow up necessary from CTS standpoint.

## 2021-01-27 ENCOUNTER — HOSPITAL ENCOUNTER (OUTPATIENT)
Dept: CARDIAC REHAB | Age: 66
Setting detail: THERAPIES SERIES
Discharge: HOME | End: 2021-01-27
Payer: COMMERCIAL

## 2021-01-29 ENCOUNTER — HOSPITAL ENCOUNTER (OUTPATIENT)
Dept: CARDIAC REHAB | Age: 66
Setting detail: THERAPIES SERIES
Discharge: HOME | End: 2021-01-29
Payer: COMMERCIAL

## 2021-02-01 ENCOUNTER — HOSPITAL ENCOUNTER (OUTPATIENT)
Dept: CARDIAC REHAB | Age: 66
Setting detail: THERAPIES SERIES
Discharge: HOME | End: 2021-02-01
Payer: COMMERCIAL

## 2021-02-02 ENCOUNTER — HOSPITAL ENCOUNTER (OUTPATIENT)
Dept: CARDIAC REHAB | Age: 66
Setting detail: THERAPIES SERIES
Discharge: HOME | End: 2021-02-02
Payer: COMMERCIAL

## 2021-02-04 ENCOUNTER — HOSPITAL ENCOUNTER (OUTPATIENT)
Dept: CARDIAC REHAB | Age: 66
Setting detail: THERAPIES SERIES
Discharge: HOME | End: 2021-02-04
Payer: COMMERCIAL

## 2021-02-16 ENCOUNTER — TELEPHONE (OUTPATIENT)
Dept: CARDIAC REHAB | Age: 66
End: 2021-02-16

## 2022-07-28 ENCOUNTER — OFFICE VISIT (OUTPATIENT)
Dept: CARDIOLOGY CLINIC | Age: 67
End: 2022-07-28
Payer: COMMERCIAL

## 2022-07-28 VITALS
WEIGHT: 269 LBS | SYSTOLIC BLOOD PRESSURE: 132 MMHG | BODY MASS INDEX: 39.84 KG/M2 | HEART RATE: 57 BPM | RESPIRATION RATE: 12 BRPM | HEIGHT: 69 IN | DIASTOLIC BLOOD PRESSURE: 68 MMHG

## 2022-07-28 DIAGNOSIS — I25.2 OLD MI (MYOCARDIAL INFARCTION): ICD-10-CM

## 2022-07-28 DIAGNOSIS — I25.10 CORONARY ARTERY DISEASE INVOLVING NATIVE CORONARY ARTERY OF NATIVE HEART WITHOUT ANGINA PECTORIS: Primary | ICD-10-CM

## 2022-07-28 DIAGNOSIS — Z95.1 HX OF CABG: ICD-10-CM

## 2022-07-28 DIAGNOSIS — I10 ESSENTIAL HYPERTENSION: ICD-10-CM

## 2022-07-28 DIAGNOSIS — E78.00 PURE HYPERCHOLESTEROLEMIA: ICD-10-CM

## 2022-07-28 DIAGNOSIS — Z95.5 STENTED CORONARY ARTERY: ICD-10-CM

## 2022-07-28 PROCEDURE — 99214 OFFICE O/P EST MOD 30 MIN: CPT | Performed by: INTERNAL MEDICINE

## 2022-07-28 PROCEDURE — 1123F ACP DISCUSS/DSCN MKR DOCD: CPT | Performed by: INTERNAL MEDICINE

## 2022-07-28 PROCEDURE — 93000 ELECTROCARDIOGRAM COMPLETE: CPT | Performed by: INTERNAL MEDICINE

## 2022-07-28 NOTE — PROGRESS NOTES
Patient Active Problem List   Diagnosis    Hypertension    Hyperlipidemia    Chest pain    Pseudoaneurysm (HCC)    Stented coronary artery    Old MI (myocardial infarction)    Hx of myocardial infarction    CAD in native artery    Coronary syndrome, acute (HCC)    COVID-19       Current Outpatient Medications   Medication Sig Dispense Refill    folic acid (FOLVITE) 1 MG tablet Take 1 tablet by mouth daily 30 tablet 0    ezetimibe (ZETIA) 10 MG tablet Take 10 mg by mouth nightly      gabapentin (NEURONTIN) 600 MG tablet Take 600 mg by mouth 2 times daily. citalopram (CELEXA) 20 MG tablet Take 20 mg by mouth daily. aspirin 81 MG tablet Take 81 mg by mouth daily. Cholecalciferol (VITAMIN D-3) 5000 UNITS TABS Take 5,000 Units by mouth daily. atorvastatin (LIPITOR) 40 MG tablet Take 40 mg by mouth daily. metoprolol (LOPRESSOR) 25 MG tablet Take 25 mg by mouth 2 times daily. ferrous sulfate (IRON 325) 325 (65 Fe) MG tablet Take 1 tablet by mouth 2 times daily (with meals) (Patient not taking: Reported on 1/26/2021) 60 tablet 0    ascorbic acid (VITAMIN C) 500 MG tablet Take 1 tablet by mouth 2 times daily (Patient not taking: Reported on 1/26/2021) 60 tablet 0     No current facility-administered medications for this visit. CC:    Patient is seen in follow up for:  1. Coronary artery disease involving native coronary artery of native heart without angina pectoris    2. Old MI (myocardial infarction)    3. Essential hypertension    4. Pure hypercholesterolemia    5. Hx of CABG    6. Stented coronary artery        HPI:  Doing well with no specific problems. Denies any shortness of breath or chest pain. Tolerating current medications well without side effects. Scheduled for colonoscopy and is on therapy with Brilinta.     ROS:   General: No unusual weight gain, no change in exercise tolerance  Skin: No rash or itching  EENT: No vision changes or nosebleeds  Cardiovascular: No orthopnea or paroxysmal nocturnal dyspnea  Respiratory: No cough or hemoptysis  Gastrointestinal: No hematemesis or recent changes in bowel habits  Genitourinary: No hematuria, urgency or frequency  Musculoskeletal: No muscular weakness or joint swelling   Neurologic / Psychiatric: No incoordination or convulsions  Allergic / Immunologic/ Lymphatic / Endocrine: No anemia or bleeding tendency    Social History     Socioeconomic History    Marital status:      Spouse name: Not on file    Number of children: Not on file    Years of education: Not on file    Highest education level: Not on file   Occupational History    Not on file   Tobacco Use    Smoking status: Never    Smokeless tobacco: Former     Types: Snuff     Quit date: 1/1/2000   Vaping Use    Vaping Use: Never used   Substance and Sexual Activity    Alcohol use: Yes     Comment: occassionally    Drug use: No    Sexual activity: Not on file   Other Topics Concern    Not on file   Social History Narrative    Not on file     Social Determinants of Health     Financial Resource Strain: Not on file   Food Insecurity: Not on file   Transportation Needs: Not on file   Physical Activity: Not on file   Stress: Not on file   Social Connections: Not on file   Intimate Partner Violence: Not on file   Housing Stability: Not on file       Family History   Problem Relation Age of Onset    Heart Disease Mother     Heart Disease Father     Heart Disease Brother     Heart Disease Maternal Aunt     Heart Disease Maternal Uncle     Heart Disease Maternal Grandfather        Past Medical History:   Diagnosis Date    CAD (coronary artery disease)     Hyperlipidemia     Hypertension     Myocardial infarct ([facility] [address]        PHYSICAL EXAM:  CONSTITUTIONAL:  Well developed, well nourished    Vitals:    07/28/22 0810   BP: 132/68   Pulse: 57   Resp: 12   Weight: 269 lb (122 kg)   Height: 5' 9\" (1.753 m)     HEAD & FACE: Normocephalic. Symmetric. EYES: No xanthelasma.   Conjunctivae

## 2023-04-24 LAB
BUN BLDV-MCNC: NORMAL MG/DL
CALCIUM SERPL-MCNC: NORMAL MG/DL
CHLORIDE BLD-SCNC: NORMAL MMOL/L
CO2: NORMAL
CREAT SERPL-MCNC: NORMAL MG/DL
EGFR: NORMAL
GLUCOSE BLD-MCNC: NORMAL MG/DL
POTASSIUM SERPL-SCNC: NORMAL MMOL/L
SODIUM BLD-SCNC: NORMAL MMOL/L

## 2023-07-27 ENCOUNTER — OFFICE VISIT (OUTPATIENT)
Dept: CARDIOLOGY CLINIC | Age: 68
End: 2023-07-27
Payer: COMMERCIAL

## 2023-07-27 VITALS
DIASTOLIC BLOOD PRESSURE: 78 MMHG | WEIGHT: 263.6 LBS | RESPIRATION RATE: 18 BRPM | BODY MASS INDEX: 39.95 KG/M2 | HEART RATE: 60 BPM | SYSTOLIC BLOOD PRESSURE: 138 MMHG | HEIGHT: 68 IN

## 2023-07-27 DIAGNOSIS — I10 ESSENTIAL HYPERTENSION: ICD-10-CM

## 2023-07-27 DIAGNOSIS — I25.10 CORONARY ARTERY DISEASE INVOLVING NATIVE CORONARY ARTERY OF NATIVE HEART WITHOUT ANGINA PECTORIS: Primary | ICD-10-CM

## 2023-07-27 DIAGNOSIS — I25.2 OLD MI (MYOCARDIAL INFARCTION): ICD-10-CM

## 2023-07-27 DIAGNOSIS — Z95.1 HX OF CABG: ICD-10-CM

## 2023-07-27 DIAGNOSIS — E78.00 PURE HYPERCHOLESTEROLEMIA: ICD-10-CM

## 2023-07-27 DIAGNOSIS — Z95.5 STENTED CORONARY ARTERY: ICD-10-CM

## 2023-07-27 PROCEDURE — 1123F ACP DISCUSS/DSCN MKR DOCD: CPT | Performed by: INTERNAL MEDICINE

## 2023-07-27 PROCEDURE — 99213 OFFICE O/P EST LOW 20 MIN: CPT | Performed by: INTERNAL MEDICINE

## 2023-07-27 PROCEDURE — 3078F DIAST BP <80 MM HG: CPT | Performed by: INTERNAL MEDICINE

## 2023-07-27 PROCEDURE — 93000 ELECTROCARDIOGRAM COMPLETE: CPT | Performed by: INTERNAL MEDICINE

## 2023-07-27 PROCEDURE — 3075F SYST BP GE 130 - 139MM HG: CPT | Performed by: INTERNAL MEDICINE

## 2023-07-27 RX ORDER — SEMAGLUTIDE 1.34 MG/ML
INJECTION, SOLUTION SUBCUTANEOUS WEEKLY
COMMUNITY
Start: 2023-07-17

## 2023-07-27 NOTE — PROGRESS NOTES
convulsions  Allergic / Immunologic/ Lymphatic / Endocrine: No anemia or bleeding tendency    Social History     Socioeconomic History    Marital status:      Spouse name: Not on file    Number of children: Not on file    Years of education: Not on file    Highest education level: Not on file   Occupational History    Not on file   Tobacco Use    Smoking status: Never    Smokeless tobacco: Former     Types: Snuff     Quit date: 1/1/2000   Vaping Use    Vaping Use: Never used   Substance and Sexual Activity    Alcohol use: Yes     Comment: occassionally    Drug use: No    Sexual activity: Not on file   Other Topics Concern    Not on file   Social History Narrative    Not on file     Social Determinants of Health     Financial Resource Strain: Not on file   Food Insecurity: Not on file   Transportation Needs: Not on file   Physical Activity: Not on file   Stress: Not on file   Social Connections: Not on file   Intimate Partner Violence: Not on file   Housing Stability: Not on file       Family History   Problem Relation Age of Onset    Heart Disease Mother     Heart Disease Father     Heart Disease Brother     Heart Disease Maternal Aunt     Heart Disease Maternal Uncle     Heart Disease Maternal Grandfather        Past Medical History:   Diagnosis Date    CAD (coronary artery disease)     Hyperlipidemia     Hypertension     Myocardial infarct ([address])        PHYSICAL EXAM:  CONSTITUTIONAL:  Well developed, well nourished    Vitals:    07/27/23 0808   BP: 138/78   Pulse: 60   Resp: 18   Weight: 263 lb 9.6 oz (119.6 kg)   Height: 5' 8\" (1.727 m)     HEAD & FACE: Normocephalic. Symmetric. EYES: No xanthelasma. Conjunctivae not injected. EARS, NOSE, MOUTH & THROAT: Good dentition. No oral pallor or cyanosis. NECK: No JVD at 30 degrees. No thyromegaly. RESPIRATORY: Clear to auscultation and percussion in all fields. No use of accessory muscle or intercostal retractions.    CARDIOVASCULAR: Regular rate and

## 2023-08-23 ENCOUNTER — TELEPHONE (OUTPATIENT)
Dept: VASCULAR SURGERY | Age: 68
End: 2023-08-23

## 2023-08-23 ENCOUNTER — OFFICE VISIT (OUTPATIENT)
Dept: PAIN MANAGEMENT | Age: 68
End: 2023-08-23
Payer: COMMERCIAL

## 2023-08-23 VITALS
OXYGEN SATURATION: 95 % | HEART RATE: 67 BPM | SYSTOLIC BLOOD PRESSURE: 148 MMHG | DIASTOLIC BLOOD PRESSURE: 79 MMHG | BODY MASS INDEX: 38.49 KG/M2 | WEIGHT: 254 LBS | HEIGHT: 68 IN | TEMPERATURE: 98.1 F | RESPIRATION RATE: 16 BRPM

## 2023-08-23 DIAGNOSIS — M47.817 LUMBOSACRAL SPONDYLOSIS WITHOUT MYELOPATHY: ICD-10-CM

## 2023-08-23 DIAGNOSIS — M48.061 SPINAL STENOSIS OF LUMBAR REGION, UNSPECIFIED WHETHER NEUROGENIC CLAUDICATION PRESENT: ICD-10-CM

## 2023-08-23 DIAGNOSIS — M54.16 LUMBAR RADICULAR PAIN: ICD-10-CM

## 2023-08-23 DIAGNOSIS — M25.552 LEFT HIP PAIN: ICD-10-CM

## 2023-08-23 DIAGNOSIS — M79.604 LEG PAIN, RIGHT: ICD-10-CM

## 2023-08-23 DIAGNOSIS — M51.36 DDD (DEGENERATIVE DISC DISEASE), LUMBAR: Primary | ICD-10-CM

## 2023-08-23 PROCEDURE — 99214 OFFICE O/P EST MOD 30 MIN: CPT | Performed by: ANESTHESIOLOGY

## 2023-08-23 PROCEDURE — 3078F DIAST BP <80 MM HG: CPT | Performed by: ANESTHESIOLOGY

## 2023-08-23 PROCEDURE — 3077F SYST BP >= 140 MM HG: CPT | Performed by: ANESTHESIOLOGY

## 2023-08-23 PROCEDURE — 1123F ACP DISCUSS/DSCN MKR DOCD: CPT | Performed by: ANESTHESIOLOGY

## 2023-08-23 PROCEDURE — 99204 OFFICE O/P NEW MOD 45 MIN: CPT | Performed by: ANESTHESIOLOGY

## 2023-09-06 ENCOUNTER — TELEPHONE (OUTPATIENT)
Dept: PAIN MANAGEMENT | Age: 68
End: 2023-09-06

## 2023-09-06 DIAGNOSIS — M51.36 DDD (DEGENERATIVE DISC DISEASE), LUMBAR: Primary | ICD-10-CM

## 2023-09-06 DIAGNOSIS — M54.16 LUMBAR RADICULAR PAIN: ICD-10-CM

## 2023-09-06 DIAGNOSIS — M48.061 SPINAL STENOSIS OF LUMBAR REGION, UNSPECIFIED WHETHER NEUROGENIC CLAUDICATION PRESENT: ICD-10-CM

## 2023-09-06 DIAGNOSIS — M79.604 LEG PAIN, RIGHT: ICD-10-CM

## 2023-09-06 DIAGNOSIS — M47.817 LUMBOSACRAL SPONDYLOSIS WITHOUT MYELOPATHY: ICD-10-CM

## 2023-09-06 NOTE — TELEPHONE ENCOUNTER
Pt called in states he was told to get a mri done and theres no order in the system to be scheduled he would need one could you please put the order in thank you

## 2023-09-20 ENCOUNTER — OFFICE VISIT (OUTPATIENT)
Dept: VASCULAR SURGERY | Age: 68
End: 2023-09-20
Payer: COMMERCIAL

## 2023-09-20 DIAGNOSIS — I73.9 PVD (PERIPHERAL VASCULAR DISEASE) (HCC): Primary | ICD-10-CM

## 2023-09-20 PROCEDURE — 1123F ACP DISCUSS/DSCN MKR DOCD: CPT | Performed by: PHYSICIAN ASSISTANT

## 2023-09-20 PROCEDURE — 99204 OFFICE O/P NEW MOD 45 MIN: CPT | Performed by: PHYSICIAN ASSISTANT

## 2023-09-26 ENCOUNTER — HOSPITAL ENCOUNTER (OUTPATIENT)
Dept: GENERAL RADIOLOGY | Age: 68
Discharge: HOME | End: 2023-09-28
Payer: COMMERCIAL

## 2023-09-26 ENCOUNTER — HOSPITAL ENCOUNTER (OUTPATIENT)
Dept: MRI IMAGING | Age: 68
Discharge: HOME | End: 2023-09-28
Payer: COMMERCIAL

## 2023-09-26 ENCOUNTER — HOSPITAL ENCOUNTER (OUTPATIENT)
Age: 68
Discharge: HOME | End: 2023-09-28
Payer: COMMERCIAL

## 2023-09-26 DIAGNOSIS — M48.061 SPINAL STENOSIS OF LUMBAR REGION, UNSPECIFIED WHETHER NEUROGENIC CLAUDICATION PRESENT: ICD-10-CM

## 2023-09-26 DIAGNOSIS — M51.36 DDD (DEGENERATIVE DISC DISEASE), LUMBAR: ICD-10-CM

## 2023-09-26 DIAGNOSIS — M47.817 LUMBOSACRAL SPONDYLOSIS WITHOUT MYELOPATHY: ICD-10-CM

## 2023-09-26 DIAGNOSIS — R52 PAIN: ICD-10-CM

## 2023-09-26 DIAGNOSIS — M79.604 LEG PAIN, RIGHT: ICD-10-CM

## 2023-09-26 DIAGNOSIS — M54.16 LUMBAR RADICULAR PAIN: ICD-10-CM

## 2023-09-26 PROCEDURE — 72148 MRI LUMBAR SPINE W/O DYE: CPT

## 2023-09-26 PROCEDURE — 73502 X-RAY EXAM HIP UNI 2-3 VIEWS: CPT

## 2023-09-26 PROCEDURE — 72110 X-RAY EXAM L-2 SPINE 4/>VWS: CPT

## 2023-09-28 ENCOUNTER — TELEMEDICINE (OUTPATIENT)
Dept: PAIN MANAGEMENT | Age: 68
End: 2023-09-28

## 2023-09-28 DIAGNOSIS — M79.2 NEURALGIA AND NEURITIS: ICD-10-CM

## 2023-09-28 DIAGNOSIS — M79.604 LEG PAIN, RIGHT: ICD-10-CM

## 2023-09-28 DIAGNOSIS — M54.16 LUMBAR RADICULAR PAIN: ICD-10-CM

## 2023-09-28 DIAGNOSIS — M51.36 DDD (DEGENERATIVE DISC DISEASE), LUMBAR: Primary | ICD-10-CM

## 2023-09-28 NOTE — PROGRESS NOTES
[facility]        [address] [place] [address]  Dept: [phone]    2023    TELEHEALTH EVALUATION -- Audio/Visual    HPI:    Jeny Valle (:  1955) has requested an audio/video evaluation for the following concern(s):    chronic left posterior hip pain and right LE pain. Right LE pain tingling/ numbness of the right LE. Danish Dobbs Has been treated by podiatry and treated for tenosynovitis of the right foot. Left low back/posterior hip pain- mainly over the SIJ area. He  has numbness, tingling of the right leg. Pain causes functional limitations/ limits Adl's : Yes    Has undergone imaging and vascular consult- here to review the results and discuss treatment plan. Previous treatments:   Physical Therapy / Chiropractic treatment: yes, continues HEP     Medications: - NSAID's : yes                        - Membrane stabilizers : yes - Gabapentin                       - Opioids : no                       - Adjuvants or Others : yes     Spine Surgeries: no     Anticoagulation medications: yes and include ASA- for cardiac issues     He is not diabetic. H/O Smoking: no  H/O alcohol abuse : no  H/O Illicit drug use : denies     Employment: employed-      Imaging:     MRI of LS spine: 2023:  IMPRESSION:  1. No fracture or bony destructive lesion. 2.  No significant central canal or lateral recess stenosis. 3. Mild neural foraminal stenoses at L4-5 and L5-S1 levels. Xray LS spine: 2023:  IMPRESSION:  Multilevel degenerative changes identified most pronounced at the  thoracolumbar spine. No evidence of acute fracture or traumatic malalignment. X-ray Hip: 2023:  IMPRESSION:  No acute abnormality of the hip. Mild narrowing involving both hip joints. Prior to Visit Medications    Medication Sig Taking?  Authorizing Provider   OZEMPIC, 1 MG/DOSE, 4 MG/3ML SOPN once a week  Historical Provider, MD   folic acid

## 2023-10-19 ENCOUNTER — HOSPITAL ENCOUNTER (OUTPATIENT)
Dept: NEUROLOGY | Age: 68
Discharge: HOME | End: 2023-10-19
Payer: COMMERCIAL

## 2023-10-19 DIAGNOSIS — M79.2 NEURALGIA AND NEURITIS: ICD-10-CM

## 2023-10-19 DIAGNOSIS — M54.16 LUMBAR RADICULAR PAIN: ICD-10-CM

## 2023-10-19 PROCEDURE — 95910 NRV CNDJ TEST 7-8 STUDIES: CPT | Performed by: PSYCHIATRY & NEUROLOGY

## 2023-10-19 PROCEDURE — 95886 MUSC TEST DONE W/N TEST COMP: CPT | Performed by: PSYCHIATRY & NEUROLOGY

## 2023-10-19 PROCEDURE — 95910 NRV CNDJ TEST 7-8 STUDIES: CPT

## 2023-10-19 PROCEDURE — 95886 MUSC TEST DONE W/N TEST COMP: CPT

## 2023-12-13 DIAGNOSIS — I73.9 PVD (PERIPHERAL VASCULAR DISEASE) (HCC): Primary | ICD-10-CM

## 2024-07-30 ENCOUNTER — OFFICE VISIT (OUTPATIENT)
Dept: CARDIOLOGY CLINIC | Age: 69
End: 2024-07-30
Payer: COMMERCIAL

## 2024-07-30 VITALS
SYSTOLIC BLOOD PRESSURE: 122 MMHG | HEART RATE: 65 BPM | RESPIRATION RATE: 18 BRPM | HEIGHT: 68 IN | WEIGHT: 238.4 LBS | BODY MASS INDEX: 36.13 KG/M2 | DIASTOLIC BLOOD PRESSURE: 70 MMHG

## 2024-07-30 DIAGNOSIS — I10 PRIMARY HYPERTENSION: Chronic | ICD-10-CM

## 2024-07-30 DIAGNOSIS — Z95.5 STENTED CORONARY ARTERY: ICD-10-CM

## 2024-07-30 DIAGNOSIS — I25.2 HX OF MYOCARDIAL INFARCTION: Chronic | ICD-10-CM

## 2024-07-30 DIAGNOSIS — I25.10 CORONARY ARTERY DISEASE INVOLVING NATIVE CORONARY ARTERY OF NATIVE HEART WITHOUT ANGINA PECTORIS: Primary | ICD-10-CM

## 2024-07-30 DIAGNOSIS — Z95.1 HX OF CABG: ICD-10-CM

## 2024-07-30 DIAGNOSIS — E78.00 PURE HYPERCHOLESTEROLEMIA: ICD-10-CM

## 2024-07-30 DIAGNOSIS — I73.9 PVD (PERIPHERAL VASCULAR DISEASE) (HCC): ICD-10-CM

## 2024-07-30 PROCEDURE — 1123F ACP DISCUSS/DSCN MKR DOCD: CPT | Performed by: INTERNAL MEDICINE

## 2024-07-30 PROCEDURE — 3074F SYST BP LT 130 MM HG: CPT | Performed by: INTERNAL MEDICINE

## 2024-07-30 PROCEDURE — 3078F DIAST BP <80 MM HG: CPT | Performed by: INTERNAL MEDICINE

## 2024-07-30 PROCEDURE — 93000 ELECTROCARDIOGRAM COMPLETE: CPT | Performed by: INTERNAL MEDICINE

## 2024-07-30 PROCEDURE — 99214 OFFICE O/P EST MOD 30 MIN: CPT | Performed by: INTERNAL MEDICINE

## 2024-07-30 RX ORDER — CALCITRIOL 0.5 UG/1
0.5 CAPSULE, LIQUID FILLED ORAL DAILY
COMMUNITY

## 2024-07-30 RX ORDER — CYCLOBENZAPRINE HCL 5 MG
5 TABLET ORAL 2 TIMES DAILY PRN
COMMUNITY
Start: 2021-01-14

## 2024-07-30 RX ORDER — CLOTRIMAZOLE AND BETAMETHASONE DIPROPIONATE 10; .64 MG/G; MG/G
CREAM TOPICAL
COMMUNITY
Start: 2024-01-05

## 2024-07-30 RX ORDER — MAGNESIUM GLUCONATE 30 MG(550)
TABLET ORAL
COMMUNITY
Start: 2021-01-14

## 2024-07-30 RX ORDER — ISOSORBIDE MONONITRATE 60 MG/1
60 TABLET, EXTENDED RELEASE ORAL DAILY
COMMUNITY
Start: 2021-01-14

## 2024-07-30 NOTE — PROGRESS NOTES
reconciliation completed.  Labs reviewed: LDL 60  Testing reviewed: EF 55%  Same cardiac medications.  Follow-up office visit: 12 months

## 2025-01-28 NOTE — PROGRESS NOTES
CVICU Admission Note    Name: Lakeshia Segundo  MRN: [id number]    CC: Postoperative Critical Care Management     Indication for Surgery/Procedure: : H/O PCI, CAD  LVEF:  Normal    RVF:  Normal     Important/Relevant PMH/PSH: Hx inferior MI (JACOB RCA 10/10); JACOB distal RCA (1/13); PTCA to RCA secondary to in-stent restenosis (10/22/2020), HTN, HLD, PVCs, DMII    Procedure/Surgeries: 12/30/2020 Sternotomy/CABG x 2 (LIMA-LAD, SVG-RCA)/SHOAIB exclusion with 40 mm AtriClip/Open RLE GSV harvest/Rigid internal fixation of the sternum with KLS plates x 2    Pacing wires: Ventricular       Physical Exam:    /69   Pulse 71   Temp 98.8 °F (37.1 °C) (Bladder)   Resp 18   Ht 5' 8\" (1.727 m)   Wt 282 lb 3 oz (128 kg)   SpO2 97%   BMI 42.91 kg/m²     Recent Labs     12/30/20  1050   WBC 15.4*   RBC 4.94   HGB 14.1   HCT 42.2   MCV 85.4   MCH 28.5   MCHC 33.4   RDW 13.7      MPV 9.8     Recent Labs     12/30/20  1050 12/30/20  1110     --    K 4.3 4.44     --    CO2 22  --    BUN 15  --    CREATININE 0.9  --    GLUCOSE 155*  --    CALCIUM 7.5*  --          Post operative CXR:        General Appearance: Arrived to ICU intubated, hemodynamically stable  Eyes: PERRL  Pulmonary: Diminished bibasilar.   No wheezes, no accessory muscle use noted   Ventilator: Mode: AC/VC, 50 FiO2, 8 PEEP, 500 Vt   Cardiovascular: RRR, no heaves or thrills palpated   Telemetry: SR with PVCs  Abdomen: Soft, OG to LIWS   Extremities: Palpable pulses all extremities, no edema   Neurologic/Psych: sedated   Skin: Warm and dry, small round area of erythema    Incision: MSI with bhargavi dressing intact, RLE SVG sites with ace wrap on    Assessment/Plan: Day of Surgery     1. CAD S/p CABG x 2 (LIMA-LAD, SVG-RCA)/SHOAIB exclusion with 40 mm AtriClip  - On Brilinta preop--will need to discuss need for resumption   - On ASA, Lipitor   - Ancef   - Monitor chest tube output and hemodynamics closely  - ID consulted for round area of erythema near Not at goal today.  Will have him let me know which meds he needs refills on to help clarify what he should be on.  Encouraged to follow up with cardiology as well.   right groin      2. Acute Postoperative Respiratory Insufficiency  - 2/2 surgery  - Intubated on lung protective ventilation  - Wean vent settings and extubate patient once awake, following commands, MANRIQUE, and no signs of bleeding  - ABGs per protocol and PRN     3. HTN  -Cleviprex ordered if needed for BP control     4. Acute Post Operative Pain   - PRN fentanyl for pain management until able to take PO     5.  DMII  -Hemoglobin A1c 6.1  -SSI every 4 hours for glycemic control     Electronically signed by JOHN Hernandez - CNP on 12/30/2020 at 11:48 AM

## 2025-07-30 ENCOUNTER — OFFICE VISIT (OUTPATIENT)
Dept: CARDIOLOGY CLINIC | Age: 70
End: 2025-07-30
Payer: MEDICARE

## 2025-07-30 VITALS
WEIGHT: 231 LBS | BODY MASS INDEX: 35.01 KG/M2 | SYSTOLIC BLOOD PRESSURE: 136 MMHG | DIASTOLIC BLOOD PRESSURE: 74 MMHG | RESPIRATION RATE: 18 BRPM | OXYGEN SATURATION: 96 % | TEMPERATURE: 97 F | HEART RATE: 60 BPM | HEIGHT: 68 IN

## 2025-07-30 DIAGNOSIS — I10 PRIMARY HYPERTENSION: ICD-10-CM

## 2025-07-30 DIAGNOSIS — I25.10 CORONARY ARTERY DISEASE INVOLVING NATIVE CORONARY ARTERY OF NATIVE HEART WITHOUT ANGINA PECTORIS: Primary | ICD-10-CM

## 2025-07-30 DIAGNOSIS — E78.00 PURE HYPERCHOLESTEROLEMIA: ICD-10-CM

## 2025-07-30 DIAGNOSIS — I25.2 HX OF MYOCARDIAL INFARCTION: ICD-10-CM

## 2025-07-30 DIAGNOSIS — Z95.5 STENTED CORONARY ARTERY: ICD-10-CM

## 2025-07-30 DIAGNOSIS — Z95.1 HX OF CABG: ICD-10-CM

## 2025-07-30 PROCEDURE — 1159F MED LIST DOCD IN RCRD: CPT | Performed by: INTERNAL MEDICINE

## 2025-07-30 PROCEDURE — 93000 ELECTROCARDIOGRAM COMPLETE: CPT | Performed by: INTERNAL MEDICINE

## 2025-07-30 PROCEDURE — 1123F ACP DISCUSS/DSCN MKR DOCD: CPT | Performed by: INTERNAL MEDICINE

## 2025-07-30 PROCEDURE — 3075F SYST BP GE 130 - 139MM HG: CPT | Performed by: INTERNAL MEDICINE

## 2025-07-30 PROCEDURE — 3078F DIAST BP <80 MM HG: CPT | Performed by: INTERNAL MEDICINE

## 2025-07-30 PROCEDURE — 99214 OFFICE O/P EST MOD 30 MIN: CPT | Performed by: INTERNAL MEDICINE

## 2025-07-30 PROCEDURE — 1160F RVW MEDS BY RX/DR IN RCRD: CPT | Performed by: INTERNAL MEDICINE

## 2025-07-30 RX ORDER — CLINDAMYCIN PHOSPHATE 10 MG/G
GEL TOPICAL
COMMUNITY
Start: 2025-03-11

## 2025-07-30 RX ORDER — CLOBETASOL PROPIONATE 0.5 MG/G
CREAM TOPICAL
COMMUNITY
Start: 2025-07-17

## 2025-07-30 NOTE — PROGRESS NOTES
Patient Active Problem List   Diagnosis    Hypertension    Hyperlipidemia    Chest pain    Pseudoaneurysm (HCC)    Stented coronary artery    Old MI (myocardial infarction)    Hx of myocardial infarction    CAD in native artery    Coronary syndrome, acute (HCC)    COVID-19    PVD (peripheral vascular disease)       Current Outpatient Medications   Medication Sig Dispense Refill    Clindamycin Phosphate (CLINDAMYCIN PHOS, TWICE-DAILY,) 1 % GEL Apply topically      clobetasol (TEMOVATE) 0.05 % cream       OZEMPIC, 1 MG/DOSE, 4 MG/3ML SOPN once a week      ezetimibe (ZETIA) 10 MG tablet Take 1 tablet by mouth nightly      gabapentin (NEURONTIN) 600 MG tablet Take 1 tablet by mouth 2 times daily.      citalopram (CELEXA) 20 MG tablet Take 1 tablet by mouth daily      aspirin 81 MG tablet Take 1 tablet by mouth daily      Cholecalciferol (VITAMIN D-3) 5000 UNITS TABS Take 1 tablet by mouth daily      atorvastatin (LIPITOR) 40 MG tablet Take 1 tablet by mouth daily      metoprolol (LOPRESSOR) 25 MG tablet Take 1 tablet by mouth 2 times daily      clotrimazole-betamethasone (LOTRISONE) 1-0.05 % cream  (Patient not taking: Reported on 7/30/2025)      cyclobenzaprine (FLEXERIL) 5 MG tablet Take 1 tablet by mouth 2 times daily as needed (Patient not taking: Reported on 7/30/2025)       No current facility-administered medications for this visit.       CC:    Patient is seen in follow up for:  1. Coronary artery disease involving native coronary artery of native heart without angina pectoris    2. Hx of myocardial infarction    3. Pure hypercholesterolemia - LDL 65    4. Hx of CABG    5. Stented coronary artery    6. Primary hypertension        HPI:  Patient is doing well without any specific cardiac problems.  Patient denies any shortness of breath, chest pain, lightheadedness or dizziness.  Patient is tolerating medications well without side effects.      ROS:   General: No unusual weight gain, no change in exercise

## 2025-08-05 SDOH — ECONOMIC STABILITY: FOOD INSECURITY: WITHIN THE PAST 12 MONTHS, THE FOOD YOU BOUGHT JUST DIDN'T LAST AND YOU DIDN'T HAVE MONEY TO GET MORE.: NEVER TRUE

## 2025-08-05 SDOH — ECONOMIC STABILITY: FOOD INSECURITY: WITHIN THE PAST 12 MONTHS, YOU WORRIED THAT YOUR FOOD WOULD RUN OUT BEFORE YOU GOT MONEY TO BUY MORE.: NEVER TRUE

## 2025-08-05 ASSESSMENT — PATIENT HEALTH QUESTIONNAIRE - PHQ9
1. LITTLE INTEREST OR PLEASURE IN DOING THINGS: NOT AT ALL
SUM OF ALL RESPONSES TO PHQ QUESTIONS 1-9: 0
2. FEELING DOWN, DEPRESSED OR HOPELESS: NOT AT ALL

## 2025-08-06 ENCOUNTER — OFFICE VISIT (OUTPATIENT)
Dept: PRIMARY CARE CLINIC | Age: 70
End: 2025-08-06
Payer: MEDICARE

## 2025-08-06 VITALS
RESPIRATION RATE: 16 BRPM | SYSTOLIC BLOOD PRESSURE: 120 MMHG | HEIGHT: 69 IN | BODY MASS INDEX: 34.36 KG/M2 | DIASTOLIC BLOOD PRESSURE: 72 MMHG | TEMPERATURE: 97.3 F | HEART RATE: 58 BPM | OXYGEN SATURATION: 99 % | WEIGHT: 232 LBS

## 2025-08-06 DIAGNOSIS — I25.2 HX OF MYOCARDIAL INFARCTION: Chronic | ICD-10-CM

## 2025-08-06 DIAGNOSIS — E78.00 PURE HYPERCHOLESTEROLEMIA: Chronic | ICD-10-CM

## 2025-08-06 DIAGNOSIS — Z12.11 COLON CANCER SCREENING: ICD-10-CM

## 2025-08-06 DIAGNOSIS — R73.03 PRE-DIABETES: ICD-10-CM

## 2025-08-06 DIAGNOSIS — I10 PRIMARY HYPERTENSION: Primary | Chronic | ICD-10-CM

## 2025-08-06 LAB
ALBUMIN: 4 G/DL (ref 3.5–5.2)
ALP BLD-CCNC: 69 U/L (ref 40–129)
ALT SERPL-CCNC: 24 U/L (ref 0–50)
ANION GAP SERPL CALCULATED.3IONS-SCNC: 8 MMOL/L (ref 7–16)
AST SERPL-CCNC: 32 U/L (ref 0–50)
BILIRUB SERPL-MCNC: 0.8 MG/DL (ref 0–1.2)
BUN BLDV-MCNC: 11 MG/DL (ref 8–23)
CALCIUM SERPL-MCNC: 9.4 MG/DL (ref 8.8–10.2)
CHLORIDE BLD-SCNC: 107 MMOL/L (ref 98–107)
CHOLESTEROL, TOTAL: 94 MG/DL
CO2: 26 MMOL/L (ref 22–29)
CREAT SERPL-MCNC: 0.9 MG/DL (ref 0.7–1.2)
GFR, ESTIMATED: >90 ML/MIN/1.73M2
GLUCOSE BLD-MCNC: 94 MG/DL (ref 74–99)
HBA1C MFR BLD: 4.8 %
HCT VFR BLD CALC: 45.9 % (ref 37–54)
HDLC SERPL-MCNC: 30 MG/DL
HEMOGLOBIN: 15.6 G/DL (ref 12.5–16.5)
LDL CHOLESTEROL: 50 MG/DL
MCH RBC QN AUTO: 30.7 PG (ref 26–35)
MCHC RBC AUTO-ENTMCNC: 34 G/DL (ref 32–34.5)
MCV RBC AUTO: 90.4 FL (ref 80–99.9)
PDW BLD-RTO: 12.9 % (ref 11.5–15)
PLATELET # BLD: 173 K/UL (ref 130–450)
PMV BLD AUTO: 10.4 FL (ref 7–12)
POTASSIUM SERPL-SCNC: 4.6 MMOL/L (ref 3.5–5.1)
RBC # BLD: 5.08 M/UL (ref 3.8–5.8)
SODIUM BLD-SCNC: 141 MMOL/L (ref 136–145)
TOTAL PROTEIN: 6.4 G/DL (ref 6.4–8.3)
TRIGL SERPL-MCNC: 71 MG/DL
VLDLC SERPL CALC-MCNC: 14 MG/DL
WBC # BLD: 5 K/UL (ref 4.5–11.5)

## 2025-08-06 PROCEDURE — 1123F ACP DISCUSS/DSCN MKR DOCD: CPT | Performed by: STUDENT IN AN ORGANIZED HEALTH CARE EDUCATION/TRAINING PROGRAM

## 2025-08-06 PROCEDURE — 3074F SYST BP LT 130 MM HG: CPT | Performed by: STUDENT IN AN ORGANIZED HEALTH CARE EDUCATION/TRAINING PROGRAM

## 2025-08-06 PROCEDURE — 83036 HEMOGLOBIN GLYCOSYLATED A1C: CPT | Performed by: STUDENT IN AN ORGANIZED HEALTH CARE EDUCATION/TRAINING PROGRAM

## 2025-08-06 PROCEDURE — 99204 OFFICE O/P NEW MOD 45 MIN: CPT | Performed by: STUDENT IN AN ORGANIZED HEALTH CARE EDUCATION/TRAINING PROGRAM

## 2025-08-06 PROCEDURE — G2211 COMPLEX E/M VISIT ADD ON: HCPCS | Performed by: STUDENT IN AN ORGANIZED HEALTH CARE EDUCATION/TRAINING PROGRAM

## 2025-08-06 PROCEDURE — 1159F MED LIST DOCD IN RCRD: CPT | Performed by: STUDENT IN AN ORGANIZED HEALTH CARE EDUCATION/TRAINING PROGRAM

## 2025-08-06 PROCEDURE — 36415 COLL VENOUS BLD VENIPUNCTURE: CPT | Performed by: STUDENT IN AN ORGANIZED HEALTH CARE EDUCATION/TRAINING PROGRAM

## 2025-08-06 PROCEDURE — 3078F DIAST BP <80 MM HG: CPT | Performed by: STUDENT IN AN ORGANIZED HEALTH CARE EDUCATION/TRAINING PROGRAM

## 2025-08-06 SDOH — HEALTH STABILITY: PHYSICAL HEALTH: ON AVERAGE, HOW MANY MINUTES DO YOU ENGAGE IN EXERCISE AT THIS LEVEL?: 20 MIN

## 2025-08-06 SDOH — HEALTH STABILITY: PHYSICAL HEALTH: ON AVERAGE, HOW MANY DAYS PER WEEK DO YOU ENGAGE IN MODERATE TO STRENUOUS EXERCISE (LIKE A BRISK WALK)?: 2 DAYS

## (undated) DEVICE — EZ GLIDE AORTIC CANNULA: Brand: EDWARDS LIFESCIENCES EZ GLIDE AORTIC CANNULA

## (undated) DEVICE — Z INACTIVE USE 2662641 SOLUTION IV 1000ML 140MEQ/L SOD 5MEQ/L K 3MEQ/L MG 27MEQ/L

## (undated) DEVICE — CATHETER THOR 32FR L23IN PVC 6 EYELET STR ATRAUM

## (undated) DEVICE — CONNECTOR PERF W64XH64XL64MM W  LUERLOCK

## (undated) DEVICE — LABEL MED CARD SURG 4 IN PANEL STRL

## (undated) DEVICE — TUBING PERF PMP L10FT OD0.25IN ID1/16IN MED CLASS VI PRECUT

## (undated) DEVICE — TAPE ADH W2INXL10YD PLAS TRNSPAR H2O RESIST HYPOALRG CURAD

## (undated) DEVICE — CHANNEL DRAIN, 28FR, HUBLESS: Brand: JACKSON-PRATT

## (undated) DEVICE — TAPE ADH W2INXL10YD WHT PAPR GENTLE BRTH FLX COMFORTABLE

## (undated) DEVICE — DISCONTINUED USE 320496 BATTERY 50-800-01 OR 50-800-03 SNGL USE

## (undated) DEVICE — GLOVE SURG SZ 6 THK91MIL LTX FREE SYN POLYISOPRENE ANTI

## (undated) DEVICE — CARDIAC STRYKER STERNAL SAW

## (undated) DEVICE — PACK OPEN HRT DRP

## (undated) DEVICE — BLOWER COR ART L16.5CM PLAS SHFT MAL W/ MIST IV SET AXIUS

## (undated) DEVICE — CATHETER THOR 32FR L23IN PVC 5 EYELET STR ATRAUM

## (undated) DEVICE — DRAPE THER FLUID WARMING 66X44 IN FLAT SLUSH DBL DISC ORS

## (undated) DEVICE — SOLUTION IV IRRIG WATER 1000ML POUR BRL 2F7114

## (undated) DEVICE — MPS® PRESSURE LINE W/TRANSDUCER: Brand: MPS

## (undated) DEVICE — SOLUTION IV IRRIG POUR BRL 0.9% SODIUM CHL 2F7124

## (undated) DEVICE — RETROGRADE CARDIOPLEGIA CATHETER: Brand: EDWARDS LIFESCIENCES RETROGRADE CARDIOPLEGIA CATHETER

## (undated) DEVICE — SET CARDIAC I

## (undated) DEVICE — GRADUATE

## (undated) DEVICE — GLOVE ORANGE PI 7 1/2   MSG9075

## (undated) DEVICE — TOWEL,OR,DSP,ST,WHITE,DLX,4/PK,20PK/CS: Brand: MEDLINE

## (undated) DEVICE — TOWEL,OR,DSP,ST,BLUE,STD,6/PK,12PK/CS: Brand: MEDLINE

## (undated) DEVICE — CANNULA PERF 7FR L5.5IN AORT ROOT RADPQ STD TIP W/ VENT LN

## (undated) DEVICE — STERILE LATEX POWDER FREE SURGICAL GLOVES WITH HYDROGEL COATING: Brand: PROTEXIS

## (undated) DEVICE — TTL1LYR 16FR10ML 100%SIL TMPST TR: Brand: MEDLINE

## (undated) DEVICE — ALCOHOL 70% RUBBING 16OZ

## (undated) DEVICE — CLOTH SURG PREP PREOPERATIVE CHLORHEXIDINE GLUC 2% READYPREP

## (undated) DEVICE — INSUFFLATION TUBING SET WITH FILTER, FUNNEL CONNECTOR AND LUER LOCK: Brand: JOSNOE MEDICAL INC

## (undated) DEVICE — GLOVE SURG SZ 65 THK91MIL LTX FREE SYN POLYISOPRENE

## (undated) DEVICE — GOWN,SIRUS,FABRNF,L,20/CS: Brand: MEDLINE

## (undated) DEVICE — APPLICATOR MEDICATED 26 CC SOLUTION HI LT ORNG CHLORAPREP

## (undated) DEVICE — BLADE CLIPPER GEN PURP NS

## (undated) DEVICE — KIT PLT RATIO DISPNS KT 2IN CANN TIP SPRY TIP DISP MAGELLAN

## (undated) DEVICE — GLOVE SURG SZ 7.5 L11.73IN FNGR THK9.8MIL STRW LTX POLYMER

## (undated) DEVICE — SET CARDIAC II

## (undated) DEVICE — CATHETER IV 24GA L3/4IN PERIPH YEL S STL POLYUR PLAS HUB

## (undated) DEVICE — AVID DUAL STAGE VENOUS DRAINAGE CANNULA: Brand: AVID DUAL STAGE VENOUS DRAINAGE CANNULA

## (undated) DEVICE — 6 FOOT DISPOSABLE EXTENSION CABLE WITH SAFE CONNECT / SCREW-DOWN

## (undated) DEVICE — Z DISCONTINUED PER MEDLINE USE 2425483 TAPE UMB L30IN DIA1/8IN WHT COT NONABSORBABLE W/O NDL FOR

## (undated) DEVICE — BASIC SINGLE BASIN 1-LF: Brand: MEDLINE INDUSTRIES, INC.

## (undated) DEVICE — MARKER A/C LOCATOR GRAFT U SILICONE

## (undated) DEVICE — TUBING, SUCTION, 3/16" X 12', STRAIGHT: Brand: MEDLINE

## (undated) DEVICE — Z DUP USE 2257490 ADHESIVE SKIN CLSRE 036ML TPCL 2CTL CNCRLTE HIGH VSCSTY DRMB

## (undated) DEVICE — DRAIN SURG SGL COLL PT TB FOR ATS BG OASIS

## (undated) DEVICE — AORTIC PUNCHES ARE USED TO CREATE A UNIFORM OPENING IN BLOOD VESSELS DURING CARDIOVASCULAR SURGERY. THE VESSEL GRAFT IS INSERTED INTO THE CREATED OPENING AND SUTURED TO THE VESSEL WALL. AORTIC LANCETS ARE USED TO MAKE A SMALL UNIFORM CUT IN A BLOOD VESSEL TO FACILITATE INSERTION OF AN AORTIC PUNCH.  PUNCHES COME IN VARIOUS LENGTHS, DIAMETERS AND TIP CONFIGURATIONS.: Brand: CLEANCUT ROTATING AORTIC PUNCH

## (undated) DEVICE — SET ACB VEIN

## (undated) DEVICE — 3M™ TEGADERM™ CHG DRESSING 25/CARTON 4 CARTONS/CASE 1657: Brand: TEGADERM™

## (undated) DEVICE — SOLUTION IV 50ML 0.9% SOD CHL PLAS CONT USP VIAFLX

## (undated) DEVICE — CONNECTOR DRNGE W3/8-0.5XH3/16XL3/16IN 2:1 SIL CARD STR

## (undated) DEVICE — DRESSING FOAM W22XL25CM FILVE LAYR FOAM DP DEF SAFETAC

## (undated) DEVICE — GLOVE ORANGE PI 7   MSG9070

## (undated) DEVICE — TIP APPL GEL PLT ENDO 5MMX32CM

## (undated) DEVICE — PICO 7 10CM X 30CM: Brand: PICO™ 7

## (undated) DEVICE — RETRACTOR RULTRACT INTERN MAMMARY ARTERY

## (undated) DEVICE — PADDLE INTERN DEFIB CHILD

## (undated) DEVICE — CANNULA INJ L2.5IN BLNT TIP 3MM CLR BODY W/ 1 W VLV DLP

## (undated) DEVICE — 1.5L THIN WALL CAN: Brand: CRD

## (undated) DEVICE — PERFUSION PACK CUST OPN HRT

## (undated) DEVICE — AGENT HEMSTAT W4XL8IN OXIDIZED REGENERATED CELOS ABSRB

## (undated) DEVICE — Device

## (undated) DEVICE — 34" SINGLE PATIENT USE HOVERMATT BREATHABLE: Brand: SINGLE PATIENT USE HOVERMATT

## (undated) DEVICE — MPS® DELIVERY SET W/ARREST AGENT AND ADDITIVE CASSETTES, HEAT EXCHANGER & 10 FT. DELIVERY TUBING: Brand: MPS

## (undated) DEVICE — SET SURG BASIN OPEN HEART NO  1 REUSABLE

## (undated) DEVICE — GOWN,SIRUS,FABRNF,XL,20/CS: Brand: MEDLINE

## (undated) DEVICE — CAMERA CARDIAC STRYKER 1488 HD

## (undated) DEVICE — SURGICAL PROCEDURE PACK CRD SEHC

## (undated) DEVICE — DRAIN CHN 19FR L0.25MM DIA6.3MM SIL RND HUBLESS FULL FLUT